# Patient Record
Sex: FEMALE | Race: WHITE | Employment: OTHER | URBAN - NONMETROPOLITAN AREA
[De-identification: names, ages, dates, MRNs, and addresses within clinical notes are randomized per-mention and may not be internally consistent; named-entity substitution may affect disease eponyms.]

---

## 2016-10-19 LAB — HBA1C MFR BLD: 8 %

## 2017-02-27 LAB
CHOLESTEROL, TOTAL: 162 MG/DL
CHOLESTEROL/HDL RATIO: 3.1
HDLC SERPL-MCNC: 52 MG/DL (ref 35–70)
LDL CHOLESTEROL CALCULATED: 83 MG/DL (ref 0–160)
TRIGL SERPL-MCNC: 135 MG/DL
VLDLC SERPL CALC-MCNC: 27 MG/DL

## 2017-04-24 ENCOUNTER — OFFICE VISIT (OUTPATIENT)
Dept: FAMILY MEDICINE CLINIC | Age: 82
End: 2017-04-24
Payer: MEDICARE

## 2017-04-24 VITALS
BODY MASS INDEX: 33.46 KG/M2 | DIASTOLIC BLOOD PRESSURE: 72 MMHG | SYSTOLIC BLOOD PRESSURE: 136 MMHG | HEIGHT: 64 IN | WEIGHT: 196 LBS | TEMPERATURE: 98.5 F | HEART RATE: 78 BPM

## 2017-04-24 DIAGNOSIS — B02.9 HERPES ZOSTER WITHOUT COMPLICATION: Primary | ICD-10-CM

## 2017-04-24 DIAGNOSIS — J06.9 UPPER RESPIRATORY TRACT INFECTION, UNSPECIFIED TYPE: ICD-10-CM

## 2017-04-24 PROCEDURE — 4040F PNEUMOC VAC/ADMIN/RCVD: CPT | Performed by: NURSE PRACTITIONER

## 2017-04-24 PROCEDURE — G8427 DOCREV CUR MEDS BY ELIG CLIN: HCPCS | Performed by: NURSE PRACTITIONER

## 2017-04-24 PROCEDURE — 1036F TOBACCO NON-USER: CPT | Performed by: NURSE PRACTITIONER

## 2017-04-24 PROCEDURE — 99213 OFFICE O/P EST LOW 20 MIN: CPT | Performed by: NURSE PRACTITIONER

## 2017-04-24 PROCEDURE — G8417 CALC BMI ABV UP PARAM F/U: HCPCS | Performed by: NURSE PRACTITIONER

## 2017-04-24 PROCEDURE — 1123F ACP DISCUSS/DSCN MKR DOCD: CPT | Performed by: NURSE PRACTITIONER

## 2017-04-24 PROCEDURE — G8400 PT W/DXA NO RESULTS DOC: HCPCS | Performed by: NURSE PRACTITIONER

## 2017-04-24 PROCEDURE — 1090F PRES/ABSN URINE INCON ASSESS: CPT | Performed by: NURSE PRACTITIONER

## 2017-04-24 RX ORDER — GABAPENTIN 300 MG/1
300 CAPSULE ORAL 3 TIMES DAILY
Qty: 90 CAPSULE | Refills: 0 | Status: SHIPPED | OUTPATIENT
Start: 2017-04-24 | End: 2017-05-31 | Stop reason: ALTCHOICE

## 2017-04-24 RX ORDER — ACYCLOVIR 800 MG/1
800 TABLET ORAL 4 TIMES DAILY
Qty: 40 TABLET | Refills: 0 | Status: SHIPPED | OUTPATIENT
Start: 2017-04-24 | End: 2017-05-04

## 2017-04-24 RX ORDER — NEBIVOLOL 5 MG/1
1 TABLET ORAL DAILY
COMMUNITY
End: 2018-09-10 | Stop reason: DRUGHIGH

## 2017-04-24 ASSESSMENT — ENCOUNTER SYMPTOMS
SORE THROAT: 0
VOMITING: 0
NAUSEA: 1
PHOTOPHOBIA: 0
SINUS PRESSURE: 1
VISUAL CHANGE: 0
BLURRED VISION: 0
RHINORRHEA: 0
COUGH: 1

## 2017-05-18 ENCOUNTER — TELEPHONE (OUTPATIENT)
Dept: FAMILY MEDICINE CLINIC | Age: 82
End: 2017-05-18

## 2017-05-18 DIAGNOSIS — B02.9 HERPES ZOSTER WITHOUT COMPLICATION: Primary | ICD-10-CM

## 2017-05-19 RX ORDER — HYDROCODONE BITARTRATE AND ACETAMINOPHEN 5; 325 MG/1; MG/1
1 TABLET ORAL EVERY 6 HOURS PRN
Qty: 20 TABLET | Refills: 0 | Status: SHIPPED | OUTPATIENT
Start: 2017-05-19 | End: 2017-05-26

## 2017-05-22 ENCOUNTER — TELEPHONE (OUTPATIENT)
Dept: FAMILY MEDICINE CLINIC | Age: 82
End: 2017-05-22

## 2017-05-25 VITALS
DIASTOLIC BLOOD PRESSURE: 76 MMHG | SYSTOLIC BLOOD PRESSURE: 128 MMHG | BODY MASS INDEX: 33.97 KG/M2 | WEIGHT: 199 LBS | HEART RATE: 98 BPM | HEIGHT: 64 IN

## 2017-05-25 DIAGNOSIS — E03.9 UNSPECIFIED HYPOTHYROIDISM: ICD-10-CM

## 2017-05-25 DIAGNOSIS — R26.89 BALANCE PROBLEM: ICD-10-CM

## 2017-05-25 PROBLEM — E78.5 HYPERLIPEMIA: Status: ACTIVE | Noted: 2017-05-25

## 2017-05-25 PROBLEM — E08.40 DIABETES MELLITUS DUE TO UNDERLYING CONDITION WITH DIABETIC NEUROPATHY (HCC): Status: ACTIVE | Noted: 2017-05-25

## 2017-05-25 PROBLEM — I25.10 CORONARY ATHEROSCLEROSIS OF NATIVE CORONARY ARTERY: Status: ACTIVE | Noted: 2017-05-25

## 2017-05-25 PROBLEM — J32.9 SINUSITIS: Status: ACTIVE | Noted: 2017-05-25

## 2017-05-25 RX ORDER — FLUOCINONIDE TOPICAL SOLUTION USP, 0.05% 0.5 MG/ML
SOLUTION TOPICAL 2 TIMES DAILY
COMMUNITY
End: 2017-08-24 | Stop reason: ALTCHOICE

## 2017-05-25 RX ORDER — PNV NO.95/FERROUS FUM/FOLIC AC 28MG-0.8MG
TABLET ORAL
COMMUNITY
End: 2017-08-24

## 2017-05-25 RX ORDER — AMOXICILLIN AND CLAVULANATE POTASSIUM 875; 125 MG/1; MG/1
1 TABLET, FILM COATED ORAL 2 TIMES DAILY
COMMUNITY
End: 2017-05-31

## 2017-05-25 RX ORDER — VITAMIN B COMPLEX
1 CAPSULE ORAL DAILY
COMMUNITY

## 2017-05-25 RX ORDER — OXYBUTYNIN CHLORIDE 5 MG/1
5 TABLET, EXTENDED RELEASE ORAL DAILY
COMMUNITY
End: 2017-08-24 | Stop reason: CLARIF

## 2017-05-25 RX ORDER — ALBUTEROL SULFATE 90 UG/1
2 AEROSOL, METERED RESPIRATORY (INHALATION) EVERY 6 HOURS PRN
COMMUNITY
End: 2018-02-12 | Stop reason: ALTCHOICE

## 2017-05-31 ENCOUNTER — OFFICE VISIT (OUTPATIENT)
Dept: FAMILY MEDICINE CLINIC | Age: 82
End: 2017-05-31
Payer: MEDICARE

## 2017-05-31 VITALS
HEIGHT: 64 IN | SYSTOLIC BLOOD PRESSURE: 128 MMHG | WEIGHT: 194 LBS | BODY MASS INDEX: 33.12 KG/M2 | HEART RATE: 80 BPM | DIASTOLIC BLOOD PRESSURE: 80 MMHG

## 2017-05-31 DIAGNOSIS — B02.29 NEURALGIA, POSTHERPETIC: Primary | ICD-10-CM

## 2017-05-31 DIAGNOSIS — N76.0 ACUTE VAGINITIS: ICD-10-CM

## 2017-05-31 PROCEDURE — G8598 ASA/ANTIPLAT THER USED: HCPCS | Performed by: FAMILY MEDICINE

## 2017-05-31 PROCEDURE — G8427 DOCREV CUR MEDS BY ELIG CLIN: HCPCS | Performed by: FAMILY MEDICINE

## 2017-05-31 PROCEDURE — 4040F PNEUMOC VAC/ADMIN/RCVD: CPT | Performed by: FAMILY MEDICINE

## 2017-05-31 PROCEDURE — G8400 PT W/DXA NO RESULTS DOC: HCPCS | Performed by: FAMILY MEDICINE

## 2017-05-31 PROCEDURE — 1123F ACP DISCUSS/DSCN MKR DOCD: CPT | Performed by: FAMILY MEDICINE

## 2017-05-31 PROCEDURE — 1090F PRES/ABSN URINE INCON ASSESS: CPT | Performed by: FAMILY MEDICINE

## 2017-05-31 PROCEDURE — 1036F TOBACCO NON-USER: CPT | Performed by: FAMILY MEDICINE

## 2017-05-31 PROCEDURE — 99214 OFFICE O/P EST MOD 30 MIN: CPT | Performed by: FAMILY MEDICINE

## 2017-05-31 PROCEDURE — G8417 CALC BMI ABV UP PARAM F/U: HCPCS | Performed by: FAMILY MEDICINE

## 2017-05-31 RX ORDER — GABAPENTIN 300 MG/1
300 CAPSULE ORAL 3 TIMES DAILY
Qty: 90 CAPSULE | Refills: 3 | Status: SHIPPED | OUTPATIENT
Start: 2017-05-31 | End: 2018-08-23 | Stop reason: SDUPTHER

## 2017-05-31 RX ORDER — FLUCONAZOLE 150 MG/1
TABLET ORAL
Qty: 2 TABLET | Refills: 0 | Status: SHIPPED | OUTPATIENT
Start: 2017-05-31 | End: 2017-06-03

## 2017-05-31 RX ORDER — LIDOCAINE 50 MG/G
1 PATCH TOPICAL DAILY
Qty: 30 PATCH | Refills: 0 | Status: SHIPPED | OUTPATIENT
Start: 2017-05-31 | End: 2018-03-07 | Stop reason: ALTCHOICE

## 2017-05-31 ASSESSMENT — PATIENT HEALTH QUESTIONNAIRE - PHQ9
1. LITTLE INTEREST OR PLEASURE IN DOING THINGS: 1
2. FEELING DOWN, DEPRESSED OR HOPELESS: 1
SUM OF ALL RESPONSES TO PHQ9 QUESTIONS 1 & 2: 2
SUM OF ALL RESPONSES TO PHQ QUESTIONS 1-9: 2

## 2017-06-01 ENCOUNTER — TELEPHONE (OUTPATIENT)
Dept: FAMILY MEDICINE CLINIC | Age: 82
End: 2017-06-01

## 2017-06-12 ENCOUNTER — TELEPHONE (OUTPATIENT)
Dept: FAMILY MEDICINE CLINIC | Age: 82
End: 2017-06-12

## 2017-08-11 LAB
AVERAGE GLUCOSE: ABNORMAL
HBA1C MFR BLD: 7.4 %

## 2017-08-24 ENCOUNTER — OFFICE VISIT (OUTPATIENT)
Dept: FAMILY MEDICINE CLINIC | Age: 82
End: 2017-08-24
Payer: MEDICARE

## 2017-08-24 VITALS
HEART RATE: 76 BPM | SYSTOLIC BLOOD PRESSURE: 110 MMHG | DIASTOLIC BLOOD PRESSURE: 70 MMHG | WEIGHT: 192 LBS | BODY MASS INDEX: 32.78 KG/M2

## 2017-08-24 DIAGNOSIS — I25.10 ATHEROSCLEROSIS OF NATIVE CORONARY ARTERY OF NATIVE HEART WITHOUT ANGINA PECTORIS: ICD-10-CM

## 2017-08-24 DIAGNOSIS — E78.2 MIXED HYPERLIPIDEMIA: ICD-10-CM

## 2017-08-24 DIAGNOSIS — Z79.4 DIABETES MELLITUS DUE TO UNDERLYING CONDITION WITH DIABETIC NEUROPATHY, WITH LONG-TERM CURRENT USE OF INSULIN (HCC): Primary | ICD-10-CM

## 2017-08-24 DIAGNOSIS — E08.40 DIABETES MELLITUS DUE TO UNDERLYING CONDITION WITH DIABETIC NEUROPATHY, WITH LONG-TERM CURRENT USE OF INSULIN (HCC): Primary | ICD-10-CM

## 2017-08-24 DIAGNOSIS — E03.9 UNSPECIFIED HYPOTHYROIDISM: ICD-10-CM

## 2017-08-24 DIAGNOSIS — N76.1 CHRONIC VAGINITIS: ICD-10-CM

## 2017-08-24 DIAGNOSIS — Z79.4 DIABETES MELLITUS DUE TO UNDERLYING CONDITION WITH DIABETIC NEUROPATHY, WITH LONG-TERM CURRENT USE OF INSULIN (HCC): ICD-10-CM

## 2017-08-24 DIAGNOSIS — E08.40 DIABETES MELLITUS DUE TO UNDERLYING CONDITION WITH DIABETIC NEUROPATHY, WITH LONG-TERM CURRENT USE OF INSULIN (HCC): ICD-10-CM

## 2017-08-24 PROCEDURE — 4040F PNEUMOC VAC/ADMIN/RCVD: CPT | Performed by: FAMILY MEDICINE

## 2017-08-24 PROCEDURE — G8598 ASA/ANTIPLAT THER USED: HCPCS | Performed by: FAMILY MEDICINE

## 2017-08-24 PROCEDURE — 1123F ACP DISCUSS/DSCN MKR DOCD: CPT | Performed by: FAMILY MEDICINE

## 2017-08-24 PROCEDURE — 1090F PRES/ABSN URINE INCON ASSESS: CPT | Performed by: FAMILY MEDICINE

## 2017-08-24 PROCEDURE — 99214 OFFICE O/P EST MOD 30 MIN: CPT | Performed by: FAMILY MEDICINE

## 2017-08-24 PROCEDURE — G8417 CALC BMI ABV UP PARAM F/U: HCPCS | Performed by: FAMILY MEDICINE

## 2017-08-24 PROCEDURE — G8427 DOCREV CUR MEDS BY ELIG CLIN: HCPCS | Performed by: FAMILY MEDICINE

## 2017-08-24 PROCEDURE — G8400 PT W/DXA NO RESULTS DOC: HCPCS | Performed by: FAMILY MEDICINE

## 2017-08-24 PROCEDURE — 1036F TOBACCO NON-USER: CPT | Performed by: FAMILY MEDICINE

## 2017-08-24 RX ORDER — FLUCONAZOLE 150 MG/1
TABLET ORAL
Qty: 2 TABLET | Refills: 2 | Status: SHIPPED | OUTPATIENT
Start: 2017-08-24 | End: 2017-12-26 | Stop reason: SDUPTHER

## 2017-08-24 RX ORDER — ESOMEPRAZOLE MAGNESIUM 40 MG/1
1 CAPSULE, DELAYED RELEASE ORAL DAILY
COMMUNITY
Start: 2017-07-23 | End: 2018-07-27

## 2017-08-24 RX ORDER — OXYBUTYNIN CHLORIDE 15 MG/1
1 TABLET, EXTENDED RELEASE ORAL DAILY
Refills: 0 | COMMUNITY
Start: 2017-07-20 | End: 2019-08-23 | Stop reason: SDUPTHER

## 2017-08-24 RX ORDER — CLOTRIMAZOLE 1 %
1 CREAM (GRAM) TOPICAL PRN
Refills: 0 | COMMUNITY
Start: 2017-06-29 | End: 2019-06-12

## 2017-08-24 RX ORDER — PRAVASTATIN SODIUM 40 MG
1 TABLET ORAL DAILY
COMMUNITY
Start: 2017-07-25 | End: 2022-09-06

## 2017-08-24 ASSESSMENT — ENCOUNTER SYMPTOMS
SHORTNESS OF BREATH: 0
ABDOMINAL PAIN: 0
DIARRHEA: 0
BLURRED VISION: 0
CONSTIPATION: 0

## 2017-12-26 DIAGNOSIS — N76.1 CHRONIC VAGINITIS: ICD-10-CM

## 2017-12-26 NOTE — TELEPHONE ENCOUNTER
Ashley Andrade is calling to request a refill on the following medication(s):  Requested Prescriptions     Pending Prescriptions Disp Refills    fluconazole (DIFLUCAN) 150 MG tablet [Pharmacy Med Name: FLUCONAZOLE 150 MG TABLET] 2 tablet 2     Sig: take 1 tablet by mouth for 1 dose and repeat in 3 days if needed       Last Visit Date (If Applicable):  7/68/6939    Next Visit Date:    3/7/2018

## 2017-12-27 RX ORDER — FLUCONAZOLE 150 MG/1
TABLET ORAL
Qty: 2 TABLET | Refills: 2 | Status: SHIPPED | OUTPATIENT
Start: 2017-12-27 | End: 2018-02-12 | Stop reason: ALTCHOICE

## 2018-02-12 ENCOUNTER — OFFICE VISIT (OUTPATIENT)
Dept: FAMILY MEDICINE CLINIC | Age: 83
End: 2018-02-12
Payer: MEDICARE

## 2018-02-12 VITALS
SYSTOLIC BLOOD PRESSURE: 124 MMHG | OXYGEN SATURATION: 97 % | WEIGHT: 186 LBS | DIASTOLIC BLOOD PRESSURE: 76 MMHG | BODY MASS INDEX: 31.75 KG/M2 | HEART RATE: 80 BPM
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
## 2018-02-12 DIAGNOSIS — S20.212A CONTUSION OF RIB ON LEFT SIDE, INITIAL ENCOUNTER: Primary | ICD-10-CM

## 2018-02-12 PROCEDURE — 1123F ACP DISCUSS/DSCN MKR DOCD: CPT | Performed by: FAMILY MEDICINE

## 2018-02-12 PROCEDURE — G8484 FLU IMMUNIZE NO ADMIN: HCPCS | Performed by: FAMILY MEDICINE

## 2018-02-12 PROCEDURE — G8427 DOCREV CUR MEDS BY ELIG CLIN: HCPCS | Performed by: FAMILY MEDICINE

## 2018-02-12 PROCEDURE — G8417 CALC BMI ABV UP PARAM F/U: HCPCS | Performed by: FAMILY MEDICINE

## 2018-02-12 PROCEDURE — 99213 OFFICE O/P EST LOW 20 MIN: CPT | Performed by: FAMILY MEDICINE

## 2018-02-12 PROCEDURE — 1090F PRES/ABSN URINE INCON ASSESS: CPT | Performed by: FAMILY MEDICINE

## 2018-02-12 PROCEDURE — G8598 ASA/ANTIPLAT THER USED: HCPCS | Performed by: FAMILY MEDICINE

## 2018-02-12 PROCEDURE — 4040F PNEUMOC VAC/ADMIN/RCVD: CPT | Performed by: FAMILY MEDICINE

## 2018-02-12 PROCEDURE — 1036F TOBACCO NON-USER: CPT | Performed by: FAMILY MEDICINE

## 2018-02-12 PROCEDURE — G8400 PT W/DXA NO RESULTS DOC: HCPCS | Performed by: FAMILY MEDICINE

## 2018-02-18 ASSESSMENT — ENCOUNTER SYMPTOMS
CHEST TIGHTNESS: 0
CONSTIPATION: 0
BLOOD IN STOOL: 0
DIARRHEA: 0
SHORTNESS OF BREATH: 0

## 2018-03-07 ENCOUNTER — OFFICE VISIT (OUTPATIENT)
Dept: FAMILY MEDICINE CLINIC | Age: 83
End: 2018-03-07
Payer: MEDICARE

## 2018-03-07 VITALS
DIASTOLIC BLOOD PRESSURE: 76 MMHG | BODY MASS INDEX: 33.49 KG/M2 | HEIGHT: 63 IN | SYSTOLIC BLOOD PRESSURE: 124 MMHG | HEART RATE: 74 BPM | WEIGHT: 189 LBS

## 2018-03-07 DIAGNOSIS — Z00.00 ROUTINE GENERAL MEDICAL EXAMINATION AT A HEALTH CARE FACILITY: ICD-10-CM

## 2018-03-07 DIAGNOSIS — E08.40 DIABETES MELLITUS DUE TO UNDERLYING CONDITION WITH DIABETIC NEUROPATHY, WITH LONG-TERM CURRENT USE OF INSULIN (HCC): Primary | ICD-10-CM

## 2018-03-07 DIAGNOSIS — Z79.4 DIABETES MELLITUS DUE TO UNDERLYING CONDITION WITH DIABETIC NEUROPATHY, WITH LONG-TERM CURRENT USE OF INSULIN (HCC): Primary | ICD-10-CM

## 2018-03-07 DIAGNOSIS — Z23 NEED FOR PROPHYLACTIC VACCINATION AND INOCULATION AGAINST VARICELLA: ICD-10-CM

## 2018-03-07 LAB — HBA1C MFR BLD: 10.2 %

## 2018-03-07 PROCEDURE — G0439 PPPS, SUBSEQ VISIT: HCPCS | Performed by: FAMILY MEDICINE

## 2018-03-07 PROCEDURE — G8427 DOCREV CUR MEDS BY ELIG CLIN: HCPCS | Performed by: FAMILY MEDICINE

## 2018-03-07 PROCEDURE — G8400 PT W/DXA NO RESULTS DOC: HCPCS | Performed by: FAMILY MEDICINE

## 2018-03-07 PROCEDURE — 1123F ACP DISCUSS/DSCN MKR DOCD: CPT | Performed by: FAMILY MEDICINE

## 2018-03-07 PROCEDURE — 99214 OFFICE O/P EST MOD 30 MIN: CPT | Performed by: FAMILY MEDICINE

## 2018-03-07 PROCEDURE — 1090F PRES/ABSN URINE INCON ASSESS: CPT | Performed by: FAMILY MEDICINE

## 2018-03-07 PROCEDURE — G8417 CALC BMI ABV UP PARAM F/U: HCPCS | Performed by: FAMILY MEDICINE

## 2018-03-07 PROCEDURE — G8598 ASA/ANTIPLAT THER USED: HCPCS | Performed by: FAMILY MEDICINE

## 2018-03-07 PROCEDURE — 4040F PNEUMOC VAC/ADMIN/RCVD: CPT | Performed by: FAMILY MEDICINE

## 2018-03-07 PROCEDURE — G8482 FLU IMMUNIZE ORDER/ADMIN: HCPCS | Performed by: FAMILY MEDICINE

## 2018-03-07 PROCEDURE — 1036F TOBACCO NON-USER: CPT | Performed by: FAMILY MEDICINE

## 2018-03-07 PROCEDURE — 83036 HEMOGLOBIN GLYCOSYLATED A1C: CPT | Performed by: FAMILY MEDICINE

## 2018-03-07 RX ORDER — FERROUS SULFATE 325(65) MG
1 TABLET ORAL DAILY
COMMUNITY
End: 2020-05-15

## 2018-03-07 ASSESSMENT — ANXIETY QUESTIONNAIRES: GAD7 TOTAL SCORE: 0

## 2018-03-07 ASSESSMENT — LIFESTYLE VARIABLES: HOW OFTEN DO YOU HAVE A DRINK CONTAINING ALCOHOL: 0

## 2018-03-07 ASSESSMENT — PATIENT HEALTH QUESTIONNAIRE - PHQ9: SUM OF ALL RESPONSES TO PHQ QUESTIONS 1-9: 0

## 2018-03-07 NOTE — PROGRESS NOTES
and 55 units  at h.s. Yes Historical Provider, MD   Multiple Vitamins-Minerals (MULTIVITAMIN PO) Take  by mouth. Yes Historical Provider, MD   Cyanocobalamin (VITAMIN B-12 CR) 1000 MCG TBCR Take  by mouth. Yes Historical Provider, MD   Omega-3 Fatty Acids (OMEGA 3 PO) Take  by mouth. Yes Historical Provider, MD   VITAMIN D-3 (COLECALCIFEROL) 400 UNITS TABS Take 2,000 Units by mouth daily.  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)     Type II or unspecified type diabetes mellitus without mention of complication, not stated as uncontrolled     Unspecified sleep apnea      Past Surgical History:   Procedure Laterality Date    APPENDECTOMY      CARDIAC CATHETERIZATION  2010    CARDIAC SURGERY  04/02/2011    repaired aorta    CARPAL TUNNEL RELEASE      COLONOSCOPY  2010    multiple colon polyps - repeat every 3 years   Talat Jackson      cataracts - Dr Sarwat Chaudhry, TOTAL ABDOMINAL      hemorrhaging, complete    THORACIC AORTIC ANEURYSM REPAIR      2011    THYROID SURGERY  1960    gland removed       Family History   Problem Relation Age of Onset    Heart Disease Mother     Heart Disease Father     Breast Cancer Sister      10 years ago     No Known Problems Sister     No Known Problems Sister     No Known Problems Sister        CareTeam (Including outside providers/suppliers regularly involved in providing care):   Patient Care Team:  Abi Ferreira MD as PCP - General (Family Medicine)  Abi Ferreira MD as PCP - S Attributed Provider  Kourtney Steel MD as Consulting Physician (Endocrinology)

## 2018-03-08 LAB
AGE FOR GFR: 82
ANION GAP SERPL CALCULATED.3IONS-SCNC: 17 MMOL/L
BUN BLDV-MCNC: 18 MG/DL
CALCIUM SERPL-MCNC: 9.3 MG/DL
CHLORIDE BLD-SCNC: 103 MMOL/L
CHOLESTEROL/HDL RATIO: 2.7 RATIO
CHOLESTEROL: 160 MG/DL
CO2: 25 MMOL/L
CREAT SERPL-MCNC: 0.4 MG/DL
CREATININE, RANDOM: 74.9 MG/DL
EGFR BF: 185 ML/MIN/1.73 M2
EGFR BM: 250 ML/MIN/1.73 M2
EGFR WF: 153 ML/MIN/1.73 M2
EGFR WM: 206 ML/MIN/1.73 M2
GLUCOSE: 265 MG/DL
HDL, DIRECT: 59 MG/DL
LDL CHOLESTEROL CALCULATED: 72.4 MG/DL
MICROALBUMIN UR-MCNC: 2.2 MG/DL
MICROALBUMIN/CREAT UR-RTO: 29.4 MCG/MG CR
POTASSIUM SERPL-SCNC: 4.9 MMOL/L
SODIUM BLD-SCNC: 140 MMOL/L
T4 FREE: 0.98 NG/DL
TRIGL SERPL-MCNC: 143 MG/DL
TSH SERPL DL<=0.05 MIU/L-ACNC: 0.13 MIU/ML
VLDLC SERPL CALC-MCNC: 29 MG/DL

## 2018-07-12 ENCOUNTER — OFFICE VISIT (OUTPATIENT)
Dept: FAMILY MEDICINE CLINIC | Age: 83
End: 2018-07-12
Payer: MEDICARE

## 2018-07-12 VITALS — SYSTOLIC BLOOD PRESSURE: 126 MMHG | DIASTOLIC BLOOD PRESSURE: 72 MMHG | HEART RATE: 72 BPM

## 2018-07-12 DIAGNOSIS — Z63.8 STRESS DUE TO FAMILY TENSION: ICD-10-CM

## 2018-07-12 DIAGNOSIS — N64.4 MASTALGIA: ICD-10-CM

## 2018-07-12 DIAGNOSIS — R07.1 CHEST PAIN ON BREATHING: ICD-10-CM

## 2018-07-12 DIAGNOSIS — H60.313 ACUTE DIFFUSE OTITIS EXTERNA OF BOTH EARS: Primary | ICD-10-CM

## 2018-07-12 LAB — AEROBIC CULTURE: NORMAL

## 2018-07-12 PROCEDURE — 1090F PRES/ABSN URINE INCON ASSESS: CPT | Performed by: FAMILY MEDICINE

## 2018-07-12 PROCEDURE — G8598 ASA/ANTIPLAT THER USED: HCPCS | Performed by: FAMILY MEDICINE

## 2018-07-12 PROCEDURE — 99214 OFFICE O/P EST MOD 30 MIN: CPT | Performed by: FAMILY MEDICINE

## 2018-07-12 PROCEDURE — 1101F PT FALLS ASSESS-DOCD LE1/YR: CPT | Performed by: FAMILY MEDICINE

## 2018-07-12 PROCEDURE — G8417 CALC BMI ABV UP PARAM F/U: HCPCS | Performed by: FAMILY MEDICINE

## 2018-07-12 PROCEDURE — 1036F TOBACCO NON-USER: CPT | Performed by: FAMILY MEDICINE

## 2018-07-12 PROCEDURE — G8400 PT W/DXA NO RESULTS DOC: HCPCS | Performed by: FAMILY MEDICINE

## 2018-07-12 PROCEDURE — 4040F PNEUMOC VAC/ADMIN/RCVD: CPT | Performed by: FAMILY MEDICINE

## 2018-07-12 PROCEDURE — 1123F ACP DISCUSS/DSCN MKR DOCD: CPT | Performed by: FAMILY MEDICINE

## 2018-07-12 PROCEDURE — 4130F TOPICAL PREP RX AOE: CPT | Performed by: FAMILY MEDICINE

## 2018-07-12 PROCEDURE — G8427 DOCREV CUR MEDS BY ELIG CLIN: HCPCS | Performed by: FAMILY MEDICINE

## 2018-07-12 RX ORDER — INSULIN GLARGINE 100 [IU]/ML
30 INJECTION, SOLUTION SUBCUTANEOUS NIGHTLY
COMMUNITY
End: 2018-07-19 | Stop reason: DRUGHIGH

## 2018-07-12 RX ORDER — DOXYCYCLINE HYCLATE 100 MG
100 TABLET ORAL 2 TIMES DAILY
Qty: 20 TABLET | Refills: 0 | Status: SHIPPED | OUTPATIENT
Start: 2018-07-12 | End: 2018-07-22

## 2018-07-12 SDOH — SOCIAL STABILITY - SOCIAL INSECURITY: OTHER SPECIFIED PROBLEMS RELATED TO PRIMARY SUPPORT GROUP: Z63.8

## 2018-07-12 ASSESSMENT — ENCOUNTER SYMPTOMS
BACK PAIN: 1
RHINORRHEA: 0
SHORTNESS OF BREATH: 0
COUGH: 0
SORE THROAT: 0
ABDOMINAL PAIN: 0
SPUTUM PRODUCTION: 0

## 2018-07-12 NOTE — PROGRESS NOTES
Sister     No Known Problems Sister        Social History   Substance Use Topics    Smoking status: Never Smoker    Smokeless tobacco: Never Used    Alcohol use No      Current Outpatient Prescriptions   Medication Sig Dispense Refill    insulin glargine (LANTUS) 100 UNIT/ML injection vial Inject 30 Units into the skin nightly      neomycin-polymyxin-hydrocortisone (CORTISPORIN) 3.5-62998-0 otic solution Place 3 drops in ear(s) 3 times daily for 10 days 1 each 0    doxycycline hyclate (VIBRA-TABS) 100 MG tablet Take 1 tablet by mouth 2 times daily for 10 days 20 tablet 0    ferrous sulfate 325 (65 Fe) MG tablet Take 1 tablet by mouth daily      pravastatin (PRAVACHOL) 40 MG tablet Take 1 tablet by mouth daily      oxybutynin (DITROPAN XL) 15 MG extended release tablet Take 1 tablet by mouth daily  0    esomeprazole (NEXIUM) 40 MG delayed release capsule Take 1 capsule by mouth daily      clotrimazole (LOTRIMIN) 1 % cream Apply 1 strip topically as needed  0    gabapentin (NEURONTIN) 300 MG capsule Take 1 capsule by mouth 3 times daily (Patient taking differently: Take 300 mg by mouth daily .) 90 capsule 3    b complex vitamins capsule Take 1 capsule by mouth daily      Insulin Pen Needle (NOVOTWIST) 32G X 5 MM MISC 1 each by Does not apply route daily      nebivolol (BYSTOLIC) 5 MG tablet Take 1 tablet by mouth daily      DULoxetine (CYMBALTA) 30 MG capsule Take 30 mg by mouth daily.  levothyroxine (SYNTHROID) 75 MCG tablet Take 75 mcg by mouth Daily.  aspirin 81 MG tablet Take 81 mg by mouth daily.  insulin regular human (HUMULIN R) 500 UNIT/ML injection Inject into the skin Indications: 60 units in a.m. , 60 untis at lunch time , and 55 units  at h.s.       Multiple Vitamins-Minerals (MULTIVITAMIN PO) Take  by mouth.  Cyanocobalamin (VITAMIN B-12 CR) 1000 MCG TBCR Take  by mouth.  Omega-3 Fatty Acids (OMEGA 3 PO) Take  by mouth.       VITAMIN D-3 (COLECALCIFEROL) 400

## 2018-07-17 ENCOUNTER — TELEPHONE (OUTPATIENT)
Dept: FAMILY MEDICINE CLINIC | Age: 83
End: 2018-07-17

## 2018-07-17 RX ORDER — SULFAMETHOXAZOLE AND TRIMETHOPRIM 800; 160 MG/1; MG/1
1 TABLET ORAL 2 TIMES DAILY
Qty: 20 TABLET | Refills: 0 | Status: SHIPPED | OUTPATIENT
Start: 2018-07-17 | End: 2018-07-27

## 2018-07-17 NOTE — TELEPHONE ENCOUNTER
Positive Aerobic Bacteria Culture. Results scanned in. Results scanned in and sent to on call due to pcp being out of the office.

## 2018-07-18 ENCOUNTER — TELEPHONE (OUTPATIENT)
Dept: FAMILY MEDICINE CLINIC | Age: 83
End: 2018-07-18

## 2018-07-18 DIAGNOSIS — N64.4 MASTALGIA: Primary | ICD-10-CM

## 2018-07-18 NOTE — TELEPHONE ENCOUNTER
Has not had a bilateral Mammogram since 2013 could they have the order for that.     Per [facility], ok for mammogram bilateral

## 2018-07-19 ENCOUNTER — OFFICE VISIT (OUTPATIENT)
Dept: FAMILY MEDICINE CLINIC | Age: 83
End: 2018-07-19
Payer: MEDICARE

## 2018-07-19 VITALS
TEMPERATURE: 101.2 F | WEIGHT: 188 LBS | BODY MASS INDEX: 32.88 KG/M2 | HEART RATE: 80 BPM | SYSTOLIC BLOOD PRESSURE: 108 MMHG | DIASTOLIC BLOOD PRESSURE: 68 MMHG

## 2018-07-19 DIAGNOSIS — R26.89 BALANCE PROBLEM: ICD-10-CM

## 2018-07-19 DIAGNOSIS — E03.9 ACQUIRED HYPOTHYROIDISM: ICD-10-CM

## 2018-07-19 DIAGNOSIS — H60.393 OTHER INFECTIVE ACUTE OTITIS EXTERNA OF BOTH EARS: ICD-10-CM

## 2018-07-19 DIAGNOSIS — R11.2 INTRACTABLE VOMITING WITH NAUSEA, UNSPECIFIED VOMITING TYPE: Primary | ICD-10-CM

## 2018-07-19 DIAGNOSIS — I25.10 ATHEROSCLEROSIS OF NATIVE CORONARY ARTERY OF NATIVE HEART WITHOUT ANGINA PECTORIS: ICD-10-CM

## 2018-07-19 PROBLEM — E08.40 DIABETES MELLITUS DUE TO UNDERLYING CONDITION WITH DIABETIC NEUROPATHY (HCC): Status: RESOLVED | Noted: 2017-05-25 | Resolved: 2018-07-19

## 2018-07-19 LAB
A/G RATIO: 1.4 RATIO
AGE FOR GFR: 83
ALBUMIN: 3.9 G/DL
ALK PHOSPHATASE: 92 UNITS/L
ALT SERPL-CCNC: 24 UNITS/L
ANION GAP SERPL CALCULATED.3IONS-SCNC: 16 MMOL/L
APPEARANCE: CLEAR
AST SERPL-CCNC: 21 UNITS/L
BACTERIA: ABNORMAL 1HPF
BANDS: 0 %
BASOPHILS # BLD: 0 %
BILIRUB SERPL-MCNC: 0.9 MG/DL
BILIRUBIN: ABNORMAL
BLOOD CULTURE, ROUTINE: NORMAL
BLOOD: ABNORMAL
BUN BLDV-MCNC: 13 MG/DL
CALCIUM SERPL-MCNC: 8.7 MG/DL
CASTS: ABNORMAL /LPF
CHLORIDE BLD-SCNC: 97 MMOL/L
CK MB: 2.1 NG/ML
CO2: 27 MMOL/L
COLOR: YELLOW
CREAT SERPL-MCNC: 0.5 MG/DL
CRYSTALS: ABNORMAL /HPF
CULTURE, BLOOD 2: NORMAL
DIFFERENTIAL: MANUAL DIFF
EGFR BF: 143 ML/MIN/1.73 M2
EGFR BM: 192 ML/MIN/1.73 M2
EGFR WF: 118 ML/MIN/1.73 M2
EGFR WM: 159 ML/MIN/1.73 M2
EOSINOPHIL # BLD: 0 %
EPITHELIAL CELLS, UA: ABNORMAL /HPF
GLOBULIN: 2.7 G/DL
GLUCOSE: 232 MG/DL
GLUCOSE: ABNORMAL MG/DL
HCT VFR BLD CALC: 39.7 %
HEMOGLOBIN: 13.3 G/DL
INDEX: 1.8 %
KETONES: ABNORMAL MG/DL
LEUKOCYTES, UA: ABNORMAL
LYMPHOCYTES # BLD: 3 %
MCH RBC QN AUTO: 29.3 PG
MCHC RBC AUTO-ENTMCNC: 33.4 G/DL
MCV RBC AUTO: 87.6 FL
MICROSCOPIC URINE: ABNORMAL
MONOCYTES: 5 %
MORPHOLOGY: NORMAL
MUCUS: ABNORMAL /HPF
MYOGLOBIN: 145.5 NG/ML
NEUTROPHILS: 92 %
NITRITE, URINE: ABNORMAL
PDW BLD-RTO: 12.8 %
PH: 6 PH
PLATELET # BLD: 177 THOU/MM3
PMV BLD AUTO: 7.9 FL
POTASSIUM SERPL-SCNC: 4.8 MMOL/L
PROTEIN,SCREEN: ABNORMAL MG/DL
RBC # BLD: 4.53 M/UL
RBC: ABNORMAL /HPF
SODIUM BLD-SCNC: 135 MMOL/L
SPECIFIC GRAVITY, URINE: 1.01 MG/DL
TOTAL CK: 114 UNITS/L
TOTAL PROTEIN: 6.6 G/DL
TROPONIN: <0.012 NG/ML
UROBILINOGEN, URINE: 0.2 MG/DL
WBC # BLD: 9.29 THOU/ML3
WBC URINE: ABNORMAL
YEAST: ABNORMAL /HPF

## 2018-07-19 RX ORDER — INSULIN GLARGINE 100 [IU]/ML
40 INJECTION, SOLUTION SUBCUTANEOUS NIGHTLY
Qty: 1 VIAL | Refills: 0 | Status: SHIPPED
Start: 2018-07-19 | End: 2019-01-09 | Stop reason: DRUGHIGH

## 2018-07-19 ASSESSMENT — ENCOUNTER SYMPTOMS
STRIDOR: 0
CONSTIPATION: 0
DIARRHEA: 0
FACIAL SWELLING: 0
SHORTNESS OF BREATH: 0
BLOOD IN STOOL: 0
COUGH: 0
PHOTOPHOBIA: 0

## 2018-07-19 NOTE — PROGRESS NOTES
Uncontrolled type 2 diabetes mellitus with diabetic polyneuropathy, with long-term current use of insulin ([facility]     5. Atherosclerosis of native coronary artery of native heart without angina pectoris     6. Acquired hypothyroidism         Will admit Venecia Lynneousmane) and do a STAT CT in light of the fever, severe HA and balance issue to rule out mastoiditis. Will check the CBC. May be simple reaction to bactrim vs viral syndrome vs extension of infection however this is much less likely in light of the overall significant improvement in every other regard. Lab Results   Component Value Date    CHOL 160 03/08/2018    TRIG 143 03/08/2018    HDL 52 02/27/2017     03/08/2018    K 4.9 03/08/2018     03/08/2018    CREATININE 0.4 (L) 03/08/2018    BUN 18 (H) 03/08/2018    CO2 25 03/08/2018    TSH 0.13 (L) 03/08/2018    LABA1C 10.2 03/07/2018    LABA1C 7.4 08/11/2017    LABA1C 8.0 10/19/2016    LABMICR 2.2 (H) 03/08/2018       Return after this hospital discharge. All patient questions answered. Pt voiced understanding. .  Patient agreed with treatment plan. Follow up as directed.      Electronically signed by Paresh Harrington MD on 7/19/2018

## 2018-07-23 ENCOUNTER — TELEPHONE (OUTPATIENT)
Dept: FAMILY MEDICINE CLINIC | Age: 83
End: 2018-07-23

## 2018-07-27 ENCOUNTER — OFFICE VISIT (OUTPATIENT)
Dept: FAMILY MEDICINE CLINIC | Age: 83
End: 2018-07-27
Payer: MEDICARE

## 2018-07-27 VITALS
OXYGEN SATURATION: 96 % | HEART RATE: 74 BPM | WEIGHT: 189 LBS | SYSTOLIC BLOOD PRESSURE: 124 MMHG | DIASTOLIC BLOOD PRESSURE: 70 MMHG | BODY MASS INDEX: 33.06 KG/M2

## 2018-07-27 DIAGNOSIS — R26.89 BALANCE PROBLEM: ICD-10-CM

## 2018-07-27 DIAGNOSIS — R11.2 INTRACTABLE VOMITING WITH NAUSEA, UNSPECIFIED VOMITING TYPE: ICD-10-CM

## 2018-07-27 DIAGNOSIS — Z63.8 FAMILY DISCORD: ICD-10-CM

## 2018-07-27 DIAGNOSIS — H90.3 SENSORINEURAL HEARING LOSS (SNHL) OF BOTH EARS: ICD-10-CM

## 2018-07-27 DIAGNOSIS — R42 DIZZINESS: ICD-10-CM

## 2018-07-27 PROCEDURE — 1111F DSCHRG MED/CURRENT MED MERGE: CPT | Performed by: FAMILY MEDICINE

## 2018-07-27 PROCEDURE — 99496 TRANSJ CARE MGMT HIGH F2F 7D: CPT | Performed by: FAMILY MEDICINE

## 2018-07-27 RX ORDER — ESOMEPRAZOLE MAGNESIUM 40 MG/1
40 CAPSULE, DELAYED RELEASE ORAL DAILY
COMMUNITY
End: 2018-07-27 | Stop reason: SDUPTHER

## 2018-07-27 RX ORDER — CHOLECALCIFEROL (VITAMIN D3) 25 MCG
1000 TABLET,CHEWABLE ORAL DAILY
COMMUNITY
End: 2020-05-15

## 2018-07-27 RX ORDER — ESOMEPRAZOLE MAGNESIUM 40 MG/1
40 CAPSULE, DELAYED RELEASE ORAL DAILY
Qty: 90 CAPSULE | Refills: 3 | Status: SHIPPED | OUTPATIENT
Start: 2018-07-27 | End: 2020-07-29 | Stop reason: SDUPTHER

## 2018-07-27 SDOH — SOCIAL STABILITY - SOCIAL INSECURITY: OTHER SPECIFIED PROBLEMS RELATED TO PRIMARY SUPPORT GROUP: Z63.8

## 2018-07-27 NOTE — PROGRESS NOTES
nebivolol (BYSTOLIC) 5 MG tablet Take 1 tablet by mouth daily      DULoxetine (CYMBALTA) 30 MG capsule Take 30 mg by mouth daily.  levothyroxine (SYNTHROID) 75 MCG tablet Take 75 mcg by mouth Daily.  aspirin 81 MG tablet Take 81 mg by mouth daily.  insulin regular human (HUMULIN R) 500 UNIT/ML injection Inject into the skin Indications: 60 units in a.m. , 60 untis at lunch time , and 55 units  at h.s.       Multiple Vitamins-Minerals (MULTIVITAMIN PO) Take  by mouth.  Cyanocobalamin (VITAMIN B-12 CR) 1000 MCG TBCR Take  by mouth.  Omega-3 Fatty Acids (OMEGA 3 PO) Take  by mouth.  VITAMIN D-3 (COLECALCIFEROL) 400 UNITS TABS Take 2,000 Units by mouth daily. Medications patient taking as of now reconciled against medications ordered at time of hospital discharge: Yes    Vitals:    07/27/18 1139   BP: 124/70   Pulse: 74   SpO2: 96%   Weight: 189 lb (85.7 kg)     Body mass index is 33.06 kg/m². Wt Readings from Last 3 Encounters:   07/27/18 189 lb (85.7 kg)   07/19/18 188 lb (85.3 kg)   03/07/18 189 lb (85.7 kg)     BP Readings from Last 3 Encounters:   07/27/18 124/70   07/19/18 108/68   07/12/18 126/72        Inpatient course: Discharge summary reviewed- see chart. Chief Complaint   Patient presents with   [address] from [facility] 7/20/18 admitted for vomiting. states now i am just tired. my ears are still affected. left ear cant hear out of at all, and right one is still muffled. denies anymore vomiting, for the most part feeling better. still using ear gtts. Had been treated for otitis externa and was getting better but then had woken up feeling good but then was suddenly very dizzy and off balance. Could not walk straight and could stop vomiting. [place] horrible. Was admitted for hydration, antiemetics and felt a lot better the next day.  .      Review of Systems   Constitutional: Negative for activity change, appetite change, diaphoresis, fatigue, of motion. Neck supple. No thyromegaly present. Cardiovascular: Normal rate, regular rhythm and normal heart sounds. Pulmonary/Chest: Effort normal and breath sounds normal. No respiratory distress. She has no wheezes. She has no rales. Musculoskeletal: Normal range of motion. She exhibits no edema. Lymphadenopathy:     She has no cervical adenopathy. Neurological: She is alert and oriented to person, place, and time. No cranial nerve deficit (hearing markedly diminished but no hearing aides in- actually grossly better than last week). Coordination (gait is much improved, still slight unsteady but much improved) abnormal.   Skin: She is not diaphoretic. Psychiatric: She has a normal mood and affect. Her behavior is normal. Judgment and thought content normal.   Nursing note and vitals reviewed. Assessment/Plan:  Disha Heller was seen today for follow-up from hospital.    Diagnoses and all orders for this visit:    Intractable vomiting with nausea, unspecified vomiting type-- resolved    Dizziness  -     [place] DISCHARGE MEDS RECONCILED W/ CURRENT OUTPATIENT MED LIST- much improved would start the PT for gait training. Not likely ear related, more related at this time to her significant neuropathy and arthritic changes    Family discord--- appt scheduled for spouse and reassured. Discussed her safety and making sure she has a safety plan-- make sure no ammunition for the shot gun in the house    Sensorineural hearing loss (SNHL) of both ears  -     [place] DISCHARGE MEDS RECONCILED W/ CURRENT OUTPATIENT MED LIST-- keep the follow up with the ENT and here if needed sooner. Balance problem    Uncontrolled type 2 diabetes mellitus with diabetic polyneuropathy, with long-term current use of insulin ([address]  - continue with the insulin per endocrine and watch closely. Follow up as scheduled with endocrine  Other orders  -     esomeprazole (NEXIUM) 40 MG delayed release capsule;  Take 1 capsule by mouth

## 2018-07-28 ASSESSMENT — ENCOUNTER SYMPTOMS
DIARRHEA: 0
WHEEZING: 0
CONSTIPATION: 0
SHORTNESS OF BREATH: 0
NAUSEA: 0
STRIDOR: 0
CHOKING: 0

## 2018-08-10 ENCOUNTER — OFFICE VISIT (OUTPATIENT)
Dept: FAMILY MEDICINE CLINIC | Age: 83
End: 2018-08-10
Payer: MEDICARE

## 2018-08-10 VITALS
HEART RATE: 88 BPM | TEMPERATURE: 100.9 F | DIASTOLIC BLOOD PRESSURE: 70 MMHG | BODY MASS INDEX: 33.76 KG/M2 | SYSTOLIC BLOOD PRESSURE: 136 MMHG | WEIGHT: 193 LBS

## 2018-08-10 DIAGNOSIS — H60.63 CHRONIC OTITIS EXTERNA OF BOTH EARS, UNSPECIFIED TYPE: ICD-10-CM

## 2018-08-10 DIAGNOSIS — J40 BRONCHITIS: Primary | ICD-10-CM

## 2018-08-10 PROCEDURE — 1123F ACP DISCUSS/DSCN MKR DOCD: CPT | Performed by: FAMILY MEDICINE

## 2018-08-10 PROCEDURE — 99213 OFFICE O/P EST LOW 20 MIN: CPT | Performed by: FAMILY MEDICINE

## 2018-08-10 PROCEDURE — G8427 DOCREV CUR MEDS BY ELIG CLIN: HCPCS | Performed by: FAMILY MEDICINE

## 2018-08-10 PROCEDURE — 4040F PNEUMOC VAC/ADMIN/RCVD: CPT | Performed by: FAMILY MEDICINE

## 2018-08-10 PROCEDURE — 1036F TOBACCO NON-USER: CPT | Performed by: FAMILY MEDICINE

## 2018-08-10 PROCEDURE — G8400 PT W/DXA NO RESULTS DOC: HCPCS | Performed by: FAMILY MEDICINE

## 2018-08-10 PROCEDURE — G8598 ASA/ANTIPLAT THER USED: HCPCS | Performed by: FAMILY MEDICINE

## 2018-08-10 PROCEDURE — 1090F PRES/ABSN URINE INCON ASSESS: CPT | Performed by: FAMILY MEDICINE

## 2018-08-10 PROCEDURE — G8417 CALC BMI ABV UP PARAM F/U: HCPCS | Performed by: FAMILY MEDICINE

## 2018-08-10 PROCEDURE — 1101F PT FALLS ASSESS-DOCD LE1/YR: CPT | Performed by: FAMILY MEDICINE

## 2018-08-10 RX ORDER — ALBUTEROL SULFATE 90 UG/1
2 AEROSOL, METERED RESPIRATORY (INHALATION) EVERY 4 HOURS PRN
Qty: 1 INHALER | Refills: 5 | Status: SHIPPED | OUTPATIENT
Start: 2018-08-10 | End: 2019-06-12

## 2018-08-10 RX ORDER — BENZONATATE 100 MG/1
100 CAPSULE ORAL 3 TIMES DAILY PRN
Qty: 20 CAPSULE | Refills: 0 | Status: SHIPPED | OUTPATIENT
Start: 2018-08-10 | End: 2018-08-17

## 2018-08-10 ASSESSMENT — ENCOUNTER SYMPTOMS
SPUTUM PRODUCTION: 0
COUGH: 1
SORE THROAT: 0
SWOLLEN GLANDS: 1
ABDOMINAL PAIN: 0
RHINORRHEA: 1

## 2018-08-10 NOTE — PROGRESS NOTES
Constitutional: She is oriented to person, place, and time. She appears well-developed and well-nourished. HENT:   Head: Normocephalic and atraumatic. Right Ear: Tympanic membrane normal. No drainage or swelling. No middle ear effusion. Decreased hearing is noted. Left Ear: There is swelling (small amount of canal edema is present). No drainage or tenderness. Decreased hearing (markedly, subjectively very minimal hearing is present) is noted. Left TM obscured by soft debris   Eyes: Conjunctivae and EOM are normal.   Neck: Normal range of motion. Neck supple. No JVD present. No thyromegaly present. Cardiovascular: Normal rate, regular rhythm and intact distal pulses. Exam reveals no gallop and no friction rub. No murmur heard. Pulmonary/Chest: Effort normal. No respiratory distress. She has wheezes (few posterior expiratory wheezes noted). Musculoskeletal: She exhibits no edema. Lymphadenopathy:     She has no cervical adenopathy. Neurological: She is alert and oriented to person, place, and time. Skin: Skin is warm. Psychiatric: She has a normal mood and affect. Her behavior is normal. Judgment and thought content normal.   Nursing note and vitals reviewed. Assessment/Plan:      Diagnosis Orders   1. Bronchitis  albuterol sulfate HFA (VENTOLIN HFA) 108 (90 Base) MCG/ACT inhaler    benzonatate (TESSALON) 100 MG capsule   2. Chronic otitis externa of both ears, unspecified type  Continue with the planned follow up with the ENT     Discussed the signs or sx to return including if there were fever, increasing SOB or increased sputum.     Lab Results   Component Value Date    WBC 9.29 07/19/2018    HGB 13.3 07/19/2018    HCT 39.7 07/19/2018     07/19/2018    CHOL 160 03/08/2018    TRIG 143 03/08/2018    HDL 52 02/27/2017    ALT 24 07/19/2018    AST 21 07/19/2018     07/19/2018    K 4.8 07/19/2018    CL 97 (L) 07/19/2018    CREATININE 0.5 07/19/2018    BUN 13 07/19/2018    CO2

## 2018-08-23 RX ORDER — GABAPENTIN 300 MG/1
300 CAPSULE ORAL DAILY
Qty: 30 CAPSULE | Refills: 1 | Status: SHIPPED | OUTPATIENT
Start: 2018-08-23 | End: 2018-12-18 | Stop reason: SDUPTHER

## 2018-09-01 RX ORDER — GABAPENTIN 300 MG/1
300 CAPSULE ORAL DAILY
Qty: 90 CAPSULE | Refills: 1 | OUTPATIENT
Start: 2018-09-01 | End: 2018-11-30

## 2018-09-10 ENCOUNTER — OFFICE VISIT (OUTPATIENT)
Dept: FAMILY MEDICINE CLINIC | Age: 83
End: 2018-09-10
Payer: MEDICARE

## 2018-09-10 VITALS
SYSTOLIC BLOOD PRESSURE: 124 MMHG | OXYGEN SATURATION: 94 % | BODY MASS INDEX: 34.2 KG/M2 | HEART RATE: 70 BPM | DIASTOLIC BLOOD PRESSURE: 74 MMHG | WEIGHT: 195.5 LBS

## 2018-09-10 DIAGNOSIS — R59.1 LYMPHADENOPATHY OF HEAD AND NECK: ICD-10-CM

## 2018-09-10 DIAGNOSIS — R29.898 WEAKNESS OF BOTH LOWER EXTREMITIES: ICD-10-CM

## 2018-09-10 DIAGNOSIS — F32.1 CURRENT MODERATE EPISODE OF MAJOR DEPRESSIVE DISORDER WITHOUT PRIOR EPISODE (HCC): ICD-10-CM

## 2018-09-10 DIAGNOSIS — E08.59 DIABETES MELLITUS DUE TO UNDERLYING CONDITION WITH OTHER CIRCULATORY COMPLICATIONS (HCC): ICD-10-CM

## 2018-09-10 LAB — HBA1C MFR BLD: 11.1 %

## 2018-09-10 PROCEDURE — G8598 ASA/ANTIPLAT THER USED: HCPCS | Performed by: FAMILY MEDICINE

## 2018-09-10 PROCEDURE — G8427 DOCREV CUR MEDS BY ELIG CLIN: HCPCS | Performed by: FAMILY MEDICINE

## 2018-09-10 PROCEDURE — 1036F TOBACCO NON-USER: CPT | Performed by: FAMILY MEDICINE

## 2018-09-10 PROCEDURE — G8417 CALC BMI ABV UP PARAM F/U: HCPCS | Performed by: FAMILY MEDICINE

## 2018-09-10 PROCEDURE — 99214 OFFICE O/P EST MOD 30 MIN: CPT | Performed by: FAMILY MEDICINE

## 2018-09-10 PROCEDURE — 1123F ACP DISCUSS/DSCN MKR DOCD: CPT | Performed by: FAMILY MEDICINE

## 2018-09-10 PROCEDURE — 83036 HEMOGLOBIN GLYCOSYLATED A1C: CPT | Performed by: FAMILY MEDICINE

## 2018-09-10 PROCEDURE — G8400 PT W/DXA NO RESULTS DOC: HCPCS | Performed by: FAMILY MEDICINE

## 2018-09-10 PROCEDURE — 1101F PT FALLS ASSESS-DOCD LE1/YR: CPT | Performed by: FAMILY MEDICINE

## 2018-09-10 PROCEDURE — 4040F PNEUMOC VAC/ADMIN/RCVD: CPT | Performed by: FAMILY MEDICINE

## 2018-09-10 PROCEDURE — 1090F PRES/ABSN URINE INCON ASSESS: CPT | Performed by: FAMILY MEDICINE

## 2018-09-10 RX ORDER — NEBIVOLOL 5 MG/1
2.5 TABLET ORAL DAILY
Qty: 30 TABLET | Refills: 0 | Status: SHIPPED
Start: 2018-09-10 | End: 2020-07-29 | Stop reason: SDUPTHER

## 2018-09-10 RX ORDER — DULOXETIN HYDROCHLORIDE 60 MG/1
60 CAPSULE, DELAYED RELEASE ORAL DAILY
Qty: 90 CAPSULE | Refills: 3 | Status: SHIPPED | OUTPATIENT
Start: 2018-09-10 | End: 2020-01-15 | Stop reason: SDUPTHER

## 2018-09-10 ASSESSMENT — ENCOUNTER SYMPTOMS
SHORTNESS OF BREATH: 0
COUGH: 0
DIARRHEA: 0
WHEEZING: 0
ABDOMINAL PAIN: 0
CONSTIPATION: 0

## 2018-09-10 NOTE — PROGRESS NOTES
 Omega-3 Fatty Acids (OMEGA 3 PO) Take  by mouth.  VITAMIN D-3 (COLECALCIFEROL) 400 UNITS TABS Take 2,000 Units by mouth daily.  clotrimazole (LOTRIMIN) 1 % cream Apply 1 strip topically as needed  0     No current facility-administered medications for this visit. Allergies   Allergen Reactions    Hydromorphone     Invokana [Canagliflozin]      yeast    Glucophage [Metformin Hcl] Nausea And Vomiting    Ketorolac Nausea And Vomiting       Health Maintenance   Topic Date Due    Flu vaccine (1) 09/01/2018    TSH testing  03/08/2019    DTaP/Tdap/Td vaccine (2 - Td) 04/17/2023    DEXA (modify frequency per FRAX score)  Completed    Pneumococcal low/med risk  Completed    Shingles Vaccine  Completed       Subjective:      Review of Systems  Respiratory: Negative for cough, shortness of breath and wheezing. Cardiovascular: Negative for chest pain, palpitations and leg swelling. Does have some feet swelling when its really hot   Gastrointestinal: Negative for abdominal pain, constipation and diarrhea. Genitourinary: Positive for frequency. Negative for urgency. Normal for me     Neurological: Negative for dizziness and headaches. My balance seems off once in a while and I kind of stagger, have done therapy for it in the past, but it still gets me sometimes. Objective:     /74   Pulse 70   Wt 195 lb 8 oz (88.7 kg)   LMP 04/24/1987 (Approximate)   SpO2 94%   BMI 34.20 kg/m²     Physical Exam   Constitutional: She is oriented to person, place, and time. She appears well-developed and well-nourished. HENT:   Head: Normocephalic. Eyes: Pupils are equal, round, and reactive to light. Conjunctivae are normal.   Neck: Neck supple. No thyromegaly present. Cardiovascular: Normal rate, regular rhythm and normal heart sounds. Pulmonary/Chest: Effort normal and breath sounds normal.   Musculoskeletal: Normal range of motion. She exhibits no edema. Lymphadenopathy:     She has cervical adenopathy (isolated 1 cm lymph node at the base of the right neck). Neurological: She is alert and oriented to person, place, and time. Psychiatric: She has a normal mood and affect. Her behavior is normal. Judgment and thought content normal.   Nursing note and vitals reviewed. Assessment/Plan:      Diagnosis Orders   1. Current moderate episode of major depressive disorder without prior episode ([facility])  DULoxetine (CYMBALTA) 60 MG extended release capsule-- start with the symbalta to try to help with her depressive sx as well as the neuropathy sx   2. Uncontrolled type 2 diabetes mellitus with diabetic polyneuropathy, with long-term current use of insulin ([facility])  POCT glycosylated hemoglobin (Hb A1C)   3. Weakness of both lower extremities  Vascular NKECHI-- rule out underlying vascs issues with her risk factors   4. Diabetes mellitus due to underlying condition with other circulatory complications ([facility])   Vascular NKECHI-- decrease the bystolic to see if this helps decrease the dizziness   5. Lymphadenopathy of head and neck  Monitor this spot and if not resovled in 1 month then would want an USN- patient has had the recent URI which may explain     Patient Instructions   Decrease the bystolic tablet to 1/2 daily. Will see how this affects your blood pressure. Watch the small spot at the base of the neck. Increase the duloxetine to 60 mg daily for the depression symptoms. New prescription for the 60 mg of the duloxetine.         Lab Results   Component Value Date    WBC 9.29 07/19/2018    HGB 13.3 07/19/2018    HCT 39.7 07/19/2018     07/19/2018    CHOL 160 03/08/2018    TRIG 143 03/08/2018    HDL 52 02/27/2017    ALT 24 07/19/2018    AST 21 07/19/2018     07/19/2018    K 4.8 07/19/2018    CL 97 (L) 07/19/2018    CREATININE 0.5 07/19/2018    BUN 13 07/19/2018    CO2 27 07/19/2018    TSH 0.13 (L) 03/08/2018    LABA1C 11.1 09/10/2018    LABA1C 10.2 03/07/2018    LABA1C 7.4 08/11/2017    LABMICR 2.2 (H) 03/08/2018       Return in about 4 weeks (around 10/8/2018). Patient given educational materials - see patient instructions. Discussed use, benefit, and side effects of prescribed medications. All patient questions answered. Pt voiced understanding. Reviewed health maintenance. Instructed to continue current medications, diet and exercise. Patient agreed with treatment plan. Follow up as directed.      Electronically signed by Brandan August MD on 9/16/2018

## 2018-09-26 PROBLEM — J32.9 SINUSITIS: Status: RESOLVED | Noted: 2017-05-25 | Resolved: 2018-09-26

## 2018-10-12 ENCOUNTER — OFFICE VISIT (OUTPATIENT)
Dept: FAMILY MEDICINE CLINIC | Age: 83
End: 2018-10-12
Payer: MEDICARE

## 2018-10-12 VITALS
BODY MASS INDEX: 34.32 KG/M2 | HEART RATE: 72 BPM | DIASTOLIC BLOOD PRESSURE: 76 MMHG | SYSTOLIC BLOOD PRESSURE: 126 MMHG | TEMPERATURE: 97.6 F | OXYGEN SATURATION: 96 % | WEIGHT: 196.19 LBS

## 2018-10-12 DIAGNOSIS — F32.4 MAJOR DEPRESSIVE DISORDER WITH SINGLE EPISODE, IN PARTIAL REMISSION (HCC): Primary | ICD-10-CM

## 2018-10-12 DIAGNOSIS — H69.83 DYSFUNCTION OF BOTH EUSTACHIAN TUBES: ICD-10-CM

## 2018-10-12 DIAGNOSIS — Z23 NEED FOR INFLUENZA VACCINATION: ICD-10-CM

## 2018-10-12 DIAGNOSIS — H60.393 OTHER INFECTIVE CHRONIC OTITIS EXTERNA OF BOTH EARS: ICD-10-CM

## 2018-10-12 PROCEDURE — G8598 ASA/ANTIPLAT THER USED: HCPCS | Performed by: FAMILY MEDICINE

## 2018-10-12 PROCEDURE — 4130F TOPICAL PREP RX AOE: CPT | Performed by: FAMILY MEDICINE

## 2018-10-12 PROCEDURE — G8417 CALC BMI ABV UP PARAM F/U: HCPCS | Performed by: FAMILY MEDICINE

## 2018-10-12 PROCEDURE — G8482 FLU IMMUNIZE ORDER/ADMIN: HCPCS | Performed by: FAMILY MEDICINE

## 2018-10-12 PROCEDURE — 99214 OFFICE O/P EST MOD 30 MIN: CPT | Performed by: FAMILY MEDICINE

## 2018-10-12 PROCEDURE — 1036F TOBACCO NON-USER: CPT | Performed by: FAMILY MEDICINE

## 2018-10-12 PROCEDURE — 1090F PRES/ABSN URINE INCON ASSESS: CPT | Performed by: FAMILY MEDICINE

## 2018-10-12 PROCEDURE — 1101F PT FALLS ASSESS-DOCD LE1/YR: CPT | Performed by: FAMILY MEDICINE

## 2018-10-12 PROCEDURE — 90662 IIV NO PRSV INCREASED AG IM: CPT | Performed by: FAMILY MEDICINE

## 2018-10-12 PROCEDURE — 4040F PNEUMOC VAC/ADMIN/RCVD: CPT | Performed by: FAMILY MEDICINE

## 2018-10-12 PROCEDURE — 1123F ACP DISCUSS/DSCN MKR DOCD: CPT | Performed by: FAMILY MEDICINE

## 2018-10-12 PROCEDURE — G0008 ADMIN INFLUENZA VIRUS VAC: HCPCS | Performed by: FAMILY MEDICINE

## 2018-10-12 PROCEDURE — G8400 PT W/DXA NO RESULTS DOC: HCPCS | Performed by: FAMILY MEDICINE

## 2018-10-12 PROCEDURE — G8427 DOCREV CUR MEDS BY ELIG CLIN: HCPCS | Performed by: FAMILY MEDICINE

## 2018-10-12 RX ORDER — TRIAMCINOLONE ACETONIDE 55 UG/1
2 SPRAY, METERED NASAL DAILY
Qty: 1 INHALER | Refills: 3 | COMMUNITY
Start: 2018-10-12 | End: 2020-05-15

## 2018-10-14 PROBLEM — F32.4 MAJOR DEPRESSIVE DISORDER WITH SINGLE EPISODE, IN PARTIAL REMISSION (HCC): Status: ACTIVE | Noted: 2018-10-14

## 2018-11-09 ENCOUNTER — OFFICE VISIT (OUTPATIENT)
Dept: FAMILY MEDICINE CLINIC | Age: 83
End: 2018-11-09
Payer: MEDICARE

## 2018-11-09 VITALS
DIASTOLIC BLOOD PRESSURE: 80 MMHG | BODY MASS INDEX: 35.07 KG/M2 | OXYGEN SATURATION: 96 % | HEART RATE: 72 BPM | SYSTOLIC BLOOD PRESSURE: 128 MMHG | WEIGHT: 200.5 LBS

## 2018-11-09 DIAGNOSIS — L40.9 PSORIASIS: ICD-10-CM

## 2018-11-09 DIAGNOSIS — F32.4 MAJOR DEPRESSIVE DISORDER WITH SINGLE EPISODE, IN PARTIAL REMISSION (HCC): Primary | ICD-10-CM

## 2018-11-09 DIAGNOSIS — E03.9 ACQUIRED HYPOTHYROIDISM: ICD-10-CM

## 2018-11-09 PROCEDURE — 1123F ACP DISCUSS/DSCN MKR DOCD: CPT | Performed by: FAMILY MEDICINE

## 2018-11-09 PROCEDURE — G8400 PT W/DXA NO RESULTS DOC: HCPCS | Performed by: FAMILY MEDICINE

## 2018-11-09 PROCEDURE — 4040F PNEUMOC VAC/ADMIN/RCVD: CPT | Performed by: FAMILY MEDICINE

## 2018-11-09 PROCEDURE — 1036F TOBACCO NON-USER: CPT | Performed by: FAMILY MEDICINE

## 2018-11-09 PROCEDURE — G8482 FLU IMMUNIZE ORDER/ADMIN: HCPCS | Performed by: FAMILY MEDICINE

## 2018-11-09 PROCEDURE — G8598 ASA/ANTIPLAT THER USED: HCPCS | Performed by: FAMILY MEDICINE

## 2018-11-09 PROCEDURE — G8427 DOCREV CUR MEDS BY ELIG CLIN: HCPCS | Performed by: FAMILY MEDICINE

## 2018-11-09 PROCEDURE — 1101F PT FALLS ASSESS-DOCD LE1/YR: CPT | Performed by: FAMILY MEDICINE

## 2018-11-09 PROCEDURE — 1090F PRES/ABSN URINE INCON ASSESS: CPT | Performed by: FAMILY MEDICINE

## 2018-11-09 PROCEDURE — G8417 CALC BMI ABV UP PARAM F/U: HCPCS | Performed by: FAMILY MEDICINE

## 2018-11-09 PROCEDURE — 99214 OFFICE O/P EST MOD 30 MIN: CPT | Performed by: FAMILY MEDICINE

## 2018-11-09 NOTE — PROGRESS NOTES
Wt 200 lb 8 oz (90.9 kg)   LMP 04/24/1987 (Approximate)   SpO2 96%   BMI 35.07 kg/m²     Physical Exam   Constitutional: She is oriented to person, place, and time. She appears well-developed and well-nourished. HENT:   Head: Normocephalic. Right Ear: No swelling or tenderness. No decreased hearing is noted. Left Ear: No swelling or tenderness. Decreased hearing is noted. Ears:    Ear canals are very narrow but also have edema and significant moisture but no excessive cerumen today   Eyes: Pupils are equal, round, and reactive to light. Conjunctivae are normal.   Neck: Neck supple. No thyromegaly present. Lymph node at the right base has been resolved   Cardiovascular: Normal rate, regular rhythm and normal heart sounds. Pulmonary/Chest: Effort normal and breath sounds normal.   Musculoskeletal: Normal range of motion. She exhibits no edema. Lymphadenopathy:     She has no cervical adenopathy. Neurological: She is alert and oriented to person, place, and time. Psychiatric: She has a normal mood and affect. Her behavior is normal. Judgment and thought content normal.   Nursing note and vitals reviewed. Assessment/Plan:      Diagnosis Orders   1. Major depressive disorder with single episode, in partial remission ([address]  Continue with the duloxetine, encouraged to consider counseling for spousal issues. Continue with volunteer issues   2. Psoriasis  fluocinolone 0.01 % cream- suspect ear dermatitis is psoriasis type but the hearing is a different issue   3. Uncontrolled type 2 diabetes mellitus with diabetic polyneuropathy, with long-term current use of insulin ([address]  Strongly encouraged the diet and increased activity for her diabetes   4.  Acquired hypothyroidism           Lab Results   Component Value Date    WBC 9.29 07/19/2018    HGB 13.3 07/19/2018    HCT 39.7 07/19/2018     07/19/2018    CHOL 160 03/08/2018    TRIG 143 03/08/2018    HDL 52 02/27/2017    ALT 24 07/19/2018    AST

## 2018-11-10 ASSESSMENT — ENCOUNTER SYMPTOMS
COUGH: 0
SHORTNESS OF BREATH: 0

## 2018-12-18 RX ORDER — GABAPENTIN 300 MG/1
CAPSULE ORAL
Qty: 30 CAPSULE | Refills: 1 | Status: SHIPPED | OUTPATIENT
Start: 2018-12-18 | End: 2018-12-26 | Stop reason: SDUPTHER

## 2018-12-26 NOTE — TELEPHONE ENCOUNTER
It looks like there may some confusion on this prescription-- [facility]. Is it to be a 90 day supply? Is it to be sent to [facility]? If [facility] is a new insurance starting Jan 1 does it need to wait to be sent until Jan 1? If [facility] is the old insurance does it need sent prior to Jan 1 for 90 days?

## 2018-12-29 RX ORDER — GABAPENTIN 300 MG/1
CAPSULE ORAL
Qty: 90 CAPSULE | Refills: 0 | Status: SHIPPED | OUTPATIENT
Start: 2018-12-29 | End: 2020-07-29 | Stop reason: SDUPTHER

## 2019-01-09 ENCOUNTER — OFFICE VISIT (OUTPATIENT)
Dept: FAMILY MEDICINE CLINIC | Age: 84
End: 2019-01-09
Payer: MEDICARE

## 2019-01-09 VITALS
DIASTOLIC BLOOD PRESSURE: 80 MMHG | WEIGHT: 198.31 LBS | BODY MASS INDEX: 34.69 KG/M2 | OXYGEN SATURATION: 95 % | SYSTOLIC BLOOD PRESSURE: 122 MMHG | HEART RATE: 85 BPM

## 2019-01-09 DIAGNOSIS — F32.4 MAJOR DEPRESSIVE DISORDER WITH SINGLE EPISODE, IN PARTIAL REMISSION (HCC): ICD-10-CM

## 2019-01-09 DIAGNOSIS — I25.10 ATHEROSCLEROSIS OF NATIVE CORONARY ARTERY OF NATIVE HEART WITHOUT ANGINA PECTORIS: ICD-10-CM

## 2019-01-09 DIAGNOSIS — K21.9 GASTROESOPHAGEAL REFLUX DISEASE WITHOUT ESOPHAGITIS: ICD-10-CM

## 2019-01-09 DIAGNOSIS — E03.9 ACQUIRED HYPOTHYROIDISM: ICD-10-CM

## 2019-01-09 PROCEDURE — 1090F PRES/ABSN URINE INCON ASSESS: CPT | Performed by: FAMILY MEDICINE

## 2019-01-09 PROCEDURE — 1101F PT FALLS ASSESS-DOCD LE1/YR: CPT | Performed by: FAMILY MEDICINE

## 2019-01-09 PROCEDURE — G8417 CALC BMI ABV UP PARAM F/U: HCPCS | Performed by: FAMILY MEDICINE

## 2019-01-09 PROCEDURE — G8598 ASA/ANTIPLAT THER USED: HCPCS | Performed by: FAMILY MEDICINE

## 2019-01-09 PROCEDURE — G8482 FLU IMMUNIZE ORDER/ADMIN: HCPCS | Performed by: FAMILY MEDICINE

## 2019-01-09 PROCEDURE — 1036F TOBACCO NON-USER: CPT | Performed by: FAMILY MEDICINE

## 2019-01-09 PROCEDURE — G8400 PT W/DXA NO RESULTS DOC: HCPCS | Performed by: FAMILY MEDICINE

## 2019-01-09 PROCEDURE — 99214 OFFICE O/P EST MOD 30 MIN: CPT | Performed by: FAMILY MEDICINE

## 2019-01-09 PROCEDURE — G8427 DOCREV CUR MEDS BY ELIG CLIN: HCPCS | Performed by: FAMILY MEDICINE

## 2019-01-09 PROCEDURE — 4040F PNEUMOC VAC/ADMIN/RCVD: CPT | Performed by: FAMILY MEDICINE

## 2019-01-09 PROCEDURE — 1123F ACP DISCUSS/DSCN MKR DOCD: CPT | Performed by: FAMILY MEDICINE

## 2019-01-09 RX ORDER — INSULIN GLARGINE 100 [IU]/ML
50 INJECTION, SOLUTION SUBCUTANEOUS NIGHTLY
Qty: 1 VIAL | Refills: 0 | Status: SHIPPED
Start: 2019-01-09 | End: 2019-05-10

## 2019-01-17 LAB
BASOPHILS ABSOLUTE: 0 10'3/UL (ref 0.1–0.2)
BASOPHILS RELATIVE PERCENT: 0.4 % (ref 0–1.7)
BUN BLDV-MCNC: 15 MG/DL (ref 7–18)
CALCIUM SERPL-MCNC: 8.7 MG/DL (ref 8.5–10.1)
CHLORIDE BLD-SCNC: 105 MMOL/L (ref 97–107)
CO2: 30 MMOL/L (ref 21–32)
CREAT SERPL-MCNC: 0.55 MG/DL (ref 0.55–1.02)
EOSINOPHILS ABSOLUTE: 0.1 10'3/UL (ref 0–0.4)
EOSINOPHILS RELATIVE PERCENT: 1 % (ref 0–6.4)
GFR CALCULATED: > 60
GLUCOSE: 121 MG/DL (ref 70–99)
HCT VFR BLD CALC: 42.4 % (ref 34.6–44.1)
HEMOGLOBIN: 13.8 G/DL (ref 11.7–14.9)
LYMPHOCYTES ABSOLUTE: 1.6 10'3/UL (ref 0.5–3.5)
LYMPHOCYTES RELATIVE PERCENT: 19.7 % (ref 17.4–45.9)
MCH RBC QN AUTO: 28.4 PG (ref 27.8–33.2)
MCHC RBC AUTO-ENTMCNC: 32.6 G/DL (ref 32.7–34.8)
MCV RBC AUTO: 86.9 FL (ref 83–97.4)
MONOCYTES ABSOLUTE: 0.9 10'3/UL (ref 0.2–0.8)
MONOCYTES RELATIVE PERCENT: 11.3 % (ref 4.4–12)
NEUTROPHILS ABSOLUTE: 5.6 10'3/UL (ref 1.5–5.6)
NEUTROPHILS SEGMENTED: 67.6 % (ref 41.2–72.1)
PDW BLD-RTO: 13.7 % (ref 12.2–15.8)
PLATELET # BLD: 249 10'3/UL (ref 122–359)
PMV BLD AUTO: 7.7 FL (ref 7.6–10.6)
POTASSIUM SERPL-SCNC: 3.7 MMOL/L (ref 3.5–5.1)
RBC # BLD: 4.88 10'6/UL (ref 3.85–4.88)
SODIUM BLD-SCNC: 142 MMOL/L (ref 136–145)
URINE CULTURE, ROUTINE: NORMAL
WBC: 8.3 10'3/UL (ref 3.2–9.3)

## 2019-01-23 ENCOUNTER — TELEPHONE (OUTPATIENT)
Dept: FAMILY MEDICINE CLINIC | Age: 84
End: 2019-01-23

## 2019-01-23 LAB
A/G RATIO: 1.1 RATIO
AGE FOR GFR: 83
ALBUMIN: 3.9 G/DL
ALK PHOSPHATASE: 93 UNITS/L
ALT SERPL-CCNC: 9 UNITS/L
ANION GAP SERPL CALCULATED.3IONS-SCNC: 11 MMOL/L
APPEARANCE: ABNORMAL
AST SERPL-CCNC: 13 UNITS/L
BACTERIA: ABNORMAL 1HPF
BILIRUB SERPL-MCNC: 0.8 MG/DL
BILIRUBIN: ABNORMAL
BLOOD: ABNORMAL
BUN BLDV-MCNC: 15 MG/DL
CALCIUM SERPL-MCNC: 9.3 MG/DL
CASTS: ABNORMAL /LPF
CHLORIDE BLD-SCNC: 97 MMOL/L
CK MB: <0.2 NG/ML
CO2: 28 MMOL/L
COLOR: YELLOW
CREAT SERPL-MCNC: 0.5 MG/DL
CRYSTALS: ABNORMAL /HPF
DIFFERENTIAL: MANUAL DIFF
EGFR BF: 143 ML/MIN/1.73 M2
EGFR BM: 192 ML/MIN/1.73 M2
EGFR WF: 118 ML/MIN/1.73 M2
EGFR WM: 159 ML/MIN/1.73 M2
EPITHELIAL CELLS, UA: ABNORMAL /HPF
GLOBULIN: 3.6 G/DL
GLUCOSE: 93 MG/DL
GLUCOSE: ABNORMAL MG/DL
HCT VFR BLD CALC: 41.7 %
HEMOGLOBIN: 13.3 G/DL
KETONES: ABNORMAL MG/DL
LEUKOCYTES, UA: ABNORMAL
LYMPHOCYTES # BLD: 7 %
MCH RBC QN AUTO: 27.2 PG
MCHC RBC AUTO-ENTMCNC: 32 G/DL
MCV RBC AUTO: 85.1 FL
MICROSCOPIC URINE: ABNORMAL
MONOCYTES: 8 %
MORPHOLOGY: NORMAL
MUCUS: ABNORMAL /HPF
MYOGLOBIN: 26.9 NG/ML
NEUTROPHILS: 85 %
NITRITE, URINE: ABNORMAL
PDW BLD-RTO: 11.8 %
PH: 7 PH
PLATELET # BLD: 254 THOU/MM3
PMV BLD AUTO: 7.4 FL
POTASSIUM SERPL-SCNC: 3.6 MMOL/L
PROTEIN,SCREEN: ABNORMAL MG/DL
RBC # BLD: 4.91 M/UL
RBC: ABNORMAL /HPF
SODIUM BLD-SCNC: 132 MMOL/L
SPECIFIC GRAVITY, URINE: 1.02 MG/DL
TOTAL CK: 36 UNITS/L
TOTAL PROTEIN: 7.5 G/DL
TROPONIN: <0.012 NG/ML
URINE CULTURE, ROUTINE: NORMAL
UROBILINOGEN, URINE: 1 MG/DL
WBC # BLD: 13.43 THOU/ML3
WBC URINE: ABNORMAL
YEAST: ABNORMAL /HPF

## 2019-01-24 ENCOUNTER — OFFICE VISIT (OUTPATIENT)
Dept: FAMILY MEDICINE CLINIC | Age: 84
End: 2019-01-24
Payer: MEDICARE

## 2019-01-24 VITALS
OXYGEN SATURATION: 96 % | HEART RATE: 100 BPM | TEMPERATURE: 98.3 F | BODY MASS INDEX: 32.18 KG/M2 | DIASTOLIC BLOOD PRESSURE: 80 MMHG | SYSTOLIC BLOOD PRESSURE: 128 MMHG | WEIGHT: 184 LBS

## 2019-01-24 DIAGNOSIS — E86.0 DEHYDRATION: ICD-10-CM

## 2019-01-24 DIAGNOSIS — N30.00 ACUTE CYSTITIS WITHOUT HEMATURIA: Primary | ICD-10-CM

## 2019-01-24 DIAGNOSIS — E78.2 MIXED HYPERLIPIDEMIA: ICD-10-CM

## 2019-01-24 PROCEDURE — 99999 PR OFFICE/OUTPT VISIT,PROCEDURE ONLY: CPT | Performed by: FAMILY MEDICINE

## 2019-01-25 LAB
AGE FOR GFR: 83
ANION GAP SERPL CALCULATED.3IONS-SCNC: 10 MMOL/L
BASOPHILS # BLD: 0.04 THOU/MM3
BUN BLDV-MCNC: 13 MG/DL
CALCIUM SERPL-MCNC: 8.5 MG/DL
CHLORIDE BLD-SCNC: 103 MMOL/L
CO2: 29 MMOL/L
CREAT SERPL-MCNC: 0.5 MG/DL
DIFFERENTIAL: AUTOMATED DIFF
EGFR BF: 143 ML/MIN/1.73 M2
EGFR BM: 192 ML/MIN/1.73 M2
EGFR WF: 118 ML/MIN/1.73 M2
EGFR WM: 159 ML/MIN/1.73 M2
EOSINOPHIL # BLD: 0.01 THOU/MM3
GLUCOSE: 111 MG/DL
HCT VFR BLD CALC: 36.4 %
HEMOGLOBIN: 11.7 G/DL
LYMPHOCYTES # BLD: 1.38 THOU/MM3
MCH RBC QN AUTO: 27.6 PG
MCHC RBC AUTO-ENTMCNC: 32 G/DL
MCV RBC AUTO: 86 FL
MONOCYTES # BLD: 0.92 THOU/MM3
NEUTROPHILS: 6.94 THOU/MM3
PDW BLD-RTO: 11.8 %
PLATELET # BLD: 248 THOU/MM3
PMV BLD AUTO: 7.6 FL
POTASSIUM SERPL-SCNC: 3.7 MMOL/L
RBC # BLD: 4.23 M/UL
SODIUM BLD-SCNC: 138 MMOL/L
WBC # BLD: 9.29 THOU/ML3

## 2019-01-28 ENCOUNTER — TELEPHONE (OUTPATIENT)
Dept: FAMILY MEDICINE CLINIC | Age: 84
End: 2019-01-28

## 2019-02-01 ENCOUNTER — OFFICE VISIT (OUTPATIENT)
Dept: FAMILY MEDICINE CLINIC | Age: 84
End: 2019-02-01
Payer: MEDICARE

## 2019-02-01 VITALS
DIASTOLIC BLOOD PRESSURE: 72 MMHG | HEART RATE: 73 BPM | OXYGEN SATURATION: 93 % | SYSTOLIC BLOOD PRESSURE: 126 MMHG | BODY MASS INDEX: 33.66 KG/M2 | WEIGHT: 192.44 LBS

## 2019-02-01 DIAGNOSIS — E86.0 DEHYDRATION: ICD-10-CM

## 2019-02-01 DIAGNOSIS — N30.01 ACUTE CYSTITIS WITH HEMATURIA: Primary | ICD-10-CM

## 2019-02-01 LAB
BILIRUBIN, POC: NORMAL
BLOOD URINE, POC: POSITIVE
CLARITY, POC: CLEAR
COLOR, POC: YELLOW
GLUCOSE URINE, POC: NORMAL
KETONES, POC: NORMAL
LEUKOCYTE EST, POC: NORMAL
NITRITE, POC: NORMAL
PH, POC: 5
PROTEIN, POC: NORMAL
SPECIFIC GRAVITY, POC: 1.01
URINE CULTURE, ROUTINE: NORMAL
UROBILINOGEN, POC: NORMAL

## 2019-02-01 PROCEDURE — 87086 URINE CULTURE/COLONY COUNT: CPT | Performed by: FAMILY MEDICINE

## 2019-02-01 PROCEDURE — 81002 URINALYSIS NONAUTO W/O SCOPE: CPT | Performed by: FAMILY MEDICINE

## 2019-02-01 PROCEDURE — 1111F DSCHRG MED/CURRENT MED MERGE: CPT | Performed by: FAMILY MEDICINE

## 2019-02-01 PROCEDURE — 99496 TRANSJ CARE MGMT HIGH F2F 7D: CPT | Performed by: FAMILY MEDICINE

## 2019-02-03 ENCOUNTER — TELEPHONE (OUTPATIENT)
Dept: FAMILY MEDICINE CLINIC | Age: 84
End: 2019-02-03

## 2019-02-04 ENCOUNTER — TELEPHONE (OUTPATIENT)
Dept: FAMILY MEDICINE CLINIC | Age: 84
End: 2019-02-04

## 2019-02-04 DIAGNOSIS — N30.00 ACUTE CYSTITIS WITHOUT HEMATURIA: Primary | ICD-10-CM

## 2019-02-04 RX ORDER — FLUCONAZOLE 150 MG/1
TABLET ORAL
Qty: 2 TABLET | Refills: 0 | Status: SHIPPED | OUTPATIENT
Start: 2019-02-04 | End: 2019-02-07

## 2019-02-04 RX ORDER — CIPROFLOXACIN 250 MG/1
250 TABLET, FILM COATED ORAL 2 TIMES DAILY
Qty: 14 TABLET | Refills: 0 | Status: SHIPPED | OUTPATIENT
Start: 2019-02-04 | End: 2019-02-11

## 2019-02-10 ASSESSMENT — ENCOUNTER SYMPTOMS
SHORTNESS OF BREATH: 0
CONSTIPATION: 0
DIARRHEA: 0
CHEST TIGHTNESS: 0
NAUSEA: 0
ABDOMINAL PAIN: 0
COUGH: 0

## 2019-05-10 ENCOUNTER — OFFICE VISIT (OUTPATIENT)
Dept: FAMILY MEDICINE CLINIC | Age: 84
End: 2019-05-10
Payer: MEDICARE

## 2019-05-10 VITALS
HEART RATE: 76 BPM | BODY MASS INDEX: 33.15 KG/M2 | WEIGHT: 189.5 LBS | DIASTOLIC BLOOD PRESSURE: 74 MMHG | SYSTOLIC BLOOD PRESSURE: 126 MMHG | OXYGEN SATURATION: 93 %

## 2019-05-10 DIAGNOSIS — E78.2 MIXED HYPERLIPIDEMIA: ICD-10-CM

## 2019-05-10 DIAGNOSIS — R26.89 BALANCE PROBLEM: ICD-10-CM

## 2019-05-10 DIAGNOSIS — I25.10 ATHEROSCLEROSIS OF NATIVE CORONARY ARTERY OF NATIVE HEART WITHOUT ANGINA PECTORIS: ICD-10-CM

## 2019-05-10 LAB — HBA1C MFR BLD: 10.9 %

## 2019-05-10 PROCEDURE — 99214 OFFICE O/P EST MOD 30 MIN: CPT | Performed by: FAMILY MEDICINE

## 2019-05-10 PROCEDURE — 83036 HEMOGLOBIN GLYCOSYLATED A1C: CPT | Performed by: FAMILY MEDICINE

## 2019-05-10 ASSESSMENT — ENCOUNTER SYMPTOMS
DIARRHEA: 0
WHEEZING: 0
COUGH: 0
CONSTIPATION: 0
SHORTNESS OF BREATH: 0
ABDOMINAL PAIN: 0

## 2019-05-10 NOTE — PATIENT INSTRUCTIONS
Finish the lantus and then try the [employer]-- check with the pharmacy to see if this is cheaper-- use also the 50 units of this. Use the fluticasone nasal spray daily for the ear issue and see if this helps.

## 2019-05-10 NOTE — PROGRESS NOTES
[address] [address] [address]  Dept: [phone]  Dept LYLA:[phone]    Sadaf Vaughn is a 80 y.o. female who presents today for her medical conditions/complaints as notedbelow. Sadaf Vaughn is c/o of 6 Month Follow-Up and Diabetes      HPI:     HPI    Continues to have the hearing issues and the ears repeatedly treated and cleaned. Continues with this. Some days can hear really well and other days is almost completely deaf. Uses the flonase once in awhile -- not regularly/     Also saw her Diabetic DrLeilani Recently. Her insurance changed. Now her insurance has changed and having trouble with the cost, working with the pharmacy to try to decrease this with the number of days etc. Has been turned down for the patient assistance program. She knows she does not eat as well as she should but has been trying to watch this as well in the last few weeks. Has done diet education multiple times and denies wanting to try this. Has been taking her blood pressure medications regularly. Also taking her pravastatin regularly without difficulty.      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)     Type II or unspecified type diabetes mellitus without mention of complication, not stated as uncontrolled     Unspecified sleep apnea       Past Surgical History:   Procedure Laterality Date    APPENDECTOMY      CARDIAC CATHETERIZATION  2010   [facility] CARDIAC SURGERY  04/02/2011    repaired aorta    CARPAL TUNNEL RELEASE      COLONOSCOPY  2010    multiple colon polyps - repeat every 3 years   [facility]      cataracts - Dr Leonard Aguirre    HYSTERECTOMY, TOTAL ABDOMINAL      hemorrhaging, complete    THORACIC AORTIC ANEURYSM REPAIR      2011    THYROID SURGERY  1960    gland removed       Family History   Problem Relation Age of Onset    Heart Disease Mother     Heart Disease Father     Breast Cancer Sister         10 years ago     No Known Problems Sister     No Known Problems Sister     No Known Problems Sister        Social History     Tobacco Use    Smoking status: Never Smoker    Smokeless tobacco: Never Used   Substance Use Topics    Alcohol use: No      Current Outpatient Medications   Medication Sig Dispense Refill    insulin glargine (TOUJEO MAX SOLOSTAR) 300 UNIT/ML injection pen Inject 50 Units into the skin nightly 2 pen 5    insulin regular human (HUMULIN R) 500 UNIT/ML concentrated injection vial Inject 80 Units into the skin 3 times daily (before meals) 3 vial 5    fluocinolone 0.01 % cream Apply topically 2 times daily 15 g 1    DULoxetine (CYMBALTA) 60 MG extended release capsule Take 1 capsule by mouth daily 90 capsule 3    nebivolol (BYSTOLIC) 5 MG tablet Take 0.5 tablets by mouth daily 30 tablet 0    Cyanocobalamin (B-12) 1000 MCG TBCR Take 1,000 mcg by mouth daily      Multiple Vitamins-Minerals (MULTIVITAMIN ADULT PO) Take 1 tablet by mouth daily      esomeprazole (NEXIUM) 40 MG delayed release capsule Take 1 capsule by mouth daily 90 capsule 3    ferrous sulfate 325 (65 Fe) MG tablet Take 1 tablet by mouth daily      pravastatin (PRAVACHOL) 40 MG tablet Take 1 tablet by mouth daily      oxybutynin (DITROPAN XL) 15 MG extended release tablet Take 1 tablet by mouth daily  0    clotrimazole (LOTRIMIN) 1 % cream Apply 1 strip topically as needed  0  b complex vitamins capsule Take 1 capsule by mouth daily      Insulin Pen Needle (NOVOTWIST) 32G X 5 MM MISC 1 each by Does not apply route daily      levothyroxine (SYNTHROID) 75 MCG tablet Take 75 mcg by mouth Daily.  aspirin 81 MG tablet Take 81 mg by mouth daily.  Omega-3 Fatty Acids (OMEGA 3 PO) Take  by mouth.  VITAMIN D-3 (COLECALCIFEROL) 400 UNITS TABS Take 2,000 Units by mouth daily.  gabapentin (NEURONTIN) 300 MG capsule TAKE 1 CAPSULE DAILY. 90 capsule 0    triamcinolone (NASACORT ALLERGY 24HR) 55 MCG/ACT nasal inhaler 2 sprays by Nasal route daily 1 Inhaler 3    albuterol sulfate HFA (VENTOLIN HFA) 108 (90 Base) MCG/ACT inhaler Inhale 2 puffs into the lungs every 4 hours as needed for Wheezing or Shortness of Breath 1 Inhaler 5     No current facility-administered medications for this visit. Allergies   Allergen Reactions    Hydromorphone     Invokana [Canagliflozin]      yeast    Glucophage [Metformin Hcl] Nausea And Vomiting    Ketorolac Nausea And Vomiting    Metformin      Other reaction(s): Intolerance-unknown  Other reaction(s): Nausea And Vomiting       Health Maintenance   Topic Date Due    TSH testing  03/08/2019    DTaP/Tdap/Td vaccine (2 - Td) 04/17/2023    DEXA (modify frequency per FRAX score)  Completed    Flu vaccine  Completed    Shingles Vaccine  Completed    Pneumococcal 65+ years Vaccine  Completed       Subjective:      Review of Systems  Respiratory: Negative for cough, shortness of breath and wheezing. Cardiovascular: Negative for chest pain, palpitations and leg swelling. Gastrointestinal: Negative for abdominal pain, constipation and diarrhea. Endocrine: Negative for polydipsia. Genitourinary: Positive for frequency and urgency. Urgency Comes and goes. Denies any pain. Neurological: Negative for dizziness and headaches.      Objective:     /74   Pulse 76   Wt 189 lb 8 oz (86 kg)   LMP 04/24/1987 (Approximate) SpO2 93%   BMI 33.15 kg/m²     Physical Exam   Constitutional: She is oriented to person, place, and time. She appears well-developed and well-nourished. HENT:   Head: Normocephalic. Right Ear: No swelling or tenderness. No decreased hearing is noted. Left Ear: No swelling or tenderness. Decreased hearing is noted. Ear canals are very narrow but also have edema and significant moisture but no excessive cerumen today   Eyes: Pupils are equal, round, and reactive to light. Conjunctivae are normal.   Neck: Neck supple. No thyromegaly present. Lymph node at the right base has been resolved   Cardiovascular: Normal rate, regular rhythm and normal heart sounds. Pulmonary/Chest: Effort normal and breath sounds normal.   Abdominal: Soft. Musculoskeletal: Normal range of motion. She exhibits no edema. Lymphadenopathy:     She has no cervical adenopathy. Neurological: She is alert and oriented to person, place, and time. Skin: No erythema. Psychiatric: She has a normal mood and affect. Her behavior is normal. Judgment and thought content normal.   Nursing note and vitals reviewed. Assessment/Plan:      Diagnosis Orders   1. 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 Also offered dietary referral again,   2. Mixed hyperlipidemia  Continue on the pravastatin at this time   3. Balance problem  Continue to use caution with the walking, suspect this is multifactorial but is stable at this time   4. Atherosclerosis of native coronary artery of native heart without angina pectoris  Continue with risk factor modification     Finish the lantus and then try the [employer] Max pen-- check with the pharmacy to see if this is cheaper-- use also the 50 units of this.      Use the fluticasone nasal spray daily for the ear issue and see if this helps. Lab Results   Component Value Date    WBC 9.29 01/25/2019    HGB 11.7 (L) 01/25/2019    HCT 36.4 (L) 01/25/2019     01/25/2019    CHOL 160 03/08/2018    TRIG 143 03/08/2018    HDL 52 02/27/2017    ALT 9 01/23/2019    AST 13 (L) 01/23/2019     01/25/2019    K 3.7 01/25/2019     01/25/2019    CREATININE 0.5 01/25/2019    BUN 13 01/25/2019    CO2 29 01/25/2019    TSH 0.13 (L) 03/08/2018    LABA1C 10.9 05/10/2019    LABA1C 11.1 09/10/2018    LABA1C 10.2 03/07/2018    LABMICR 2.2 (H) 03/08/2018       Return in about 4 months (around 9/10/2019) for DM follow up, AWV. Patient given educational materials - see patientinstructions. Discussed use, benefit, and side effects of prescribed medications. All patient questions answered. Pt voiced understanding. Reviewed health maintenance. Instructed to continue current medications, diet andexercise. Patient agreed with treatment plan. Follow up as directed.      Electronically signed by Heidi Vergara MD on 5/12/2019

## 2019-05-10 NOTE — PROGRESS NOTES
Review of Systems   Respiratory: Negative for cough, shortness of breath and wheezing. Cardiovascular: Negative for chest pain, palpitations and leg swelling. Gastrointestinal: Negative for abdominal pain, constipation and diarrhea. Endocrine: Negative for polydipsia. Genitourinary: Positive for frequency and urgency. Urgency Comes and goes. Denies any pain. Neurological: Negative for dizziness and headaches.

## 2019-05-23 ENCOUNTER — TELEPHONE (OUTPATIENT)
Dept: FAMILY MEDICINE CLINIC | Age: 84
End: 2019-05-23

## 2019-05-29 ENCOUNTER — TELEPHONE (OUTPATIENT)
Dept: FAMILY MEDICINE CLINIC | Age: 84
End: 2019-05-29

## 2019-05-29 RX ORDER — AMOXICILLIN 875 MG/1
TABLET, COATED ORAL
Refills: 0 | COMMUNITY
Start: 2019-05-22 | End: 2019-06-12

## 2019-05-29 RX ORDER — ASPIRIN 81 MG/1
TABLET, CHEWABLE ORAL
COMMUNITY

## 2019-05-29 RX ORDER — CALCIUM CITRATE/VITAMIN D3 200MG-6.25
TABLET ORAL
Refills: 0 | COMMUNITY
Start: 2019-04-30

## 2019-05-30 ENCOUNTER — OFFICE VISIT (OUTPATIENT)
Dept: FAMILY MEDICINE CLINIC | Age: 84
End: 2019-05-30
Payer: MEDICARE

## 2019-05-30 ENCOUNTER — TELEPHONE (OUTPATIENT)
Dept: FAMILY MEDICINE CLINIC | Age: 84
End: 2019-05-30

## 2019-05-30 VITALS
SYSTOLIC BLOOD PRESSURE: 128 MMHG | DIASTOLIC BLOOD PRESSURE: 72 MMHG | OXYGEN SATURATION: 92 % | BODY MASS INDEX: 30.61 KG/M2 | WEIGHT: 175 LBS | HEART RATE: 94 BPM

## 2019-05-30 DIAGNOSIS — F32.4 MAJOR DEPRESSIVE DISORDER WITH SINGLE EPISODE, IN PARTIAL REMISSION (HCC): ICD-10-CM

## 2019-05-30 DIAGNOSIS — E03.9 ACQUIRED HYPOTHYROIDISM: ICD-10-CM

## 2019-05-30 DIAGNOSIS — E16.2 HYPOGLYCEMIA: ICD-10-CM

## 2019-05-30 DIAGNOSIS — N39.0 RECURRENT UTI: ICD-10-CM

## 2019-05-30 PROBLEM — H90.A32 MIXED CONDUCTIVE AND SENSORINEURAL HEARING LOSS OF LEFT EAR WITH RESTRICTED HEARING OF RIGHT EAR: Status: ACTIVE | Noted: 2018-08-27

## 2019-05-30 PROBLEM — Z86.79 H/O THORACIC AORTIC ANEURYSM REPAIR: Status: ACTIVE | Noted: 2018-03-26

## 2019-05-30 PROBLEM — I34.0 NONRHEUMATIC MITRAL (VALVE) INSUFFICIENCY: Status: ACTIVE | Noted: 2019-05-30

## 2019-05-30 PROBLEM — Z98.890 H/O THORACIC AORTIC ANEURYSM REPAIR: Status: ACTIVE | Noted: 2018-03-26

## 2019-05-30 LAB
BACTERIA URINE, POC: NORMAL
BILIRUBIN URINE: 0.2 MG/DL
BLOOD, URINE: POSITIVE
CASTS URINE, POC: NORMAL
CLARITY: CLEAR
COLOR: NORMAL
CRYSTALS URINE, POC: NORMAL
EPI CELLS URINE, POC: NORMAL
GLUCOSE URINE: NORMAL
KETONES, URINE: NEGATIVE
LEUKOCYTE EST, POC: NORMAL
NITRITE, URINE: NEGATIVE
PH UA: 6 (ref 4.5–8)
PROTEIN UA: NEGATIVE
RBC URINE, POC: NORMAL
SPECIFIC GRAVITY UA: 1 (ref 1–1.03)
UROBILINOGEN, URINE: NORMAL
WBC URINE, POC: NORMAL
YEAST URINE, POC: NORMAL

## 2019-05-30 PROCEDURE — 81000 URINALYSIS NONAUTO W/SCOPE: CPT | Performed by: FAMILY MEDICINE

## 2019-05-30 PROCEDURE — 99495 TRANSJ CARE MGMT MOD F2F 14D: CPT | Performed by: FAMILY MEDICINE

## 2019-05-30 NOTE — PROGRESS NOTES
Post-Discharge Transitional Care Management Services or Hospital Follow Up      Hanh Ortiz   YOB: 1935    Date of Office Visit:  5/30/2019  Date of Hospital Admission: 5/18/2019  Date of Hospital Discharge: 5/22/2019    Care management risk score Rising risk (score 2-5) and Complex Care (Scores >=6): 2     Non face to face  following discharge, date last encounter closed (first attempt may have been earlier):5/23/2019  Call initiated 2 business days of discharge: Yes    Patient Active Problem List   Diagnosis    Otitis externa    GERD (gastroesophageal reflux disease)    Coronary atherosclerosis of native coronary artery    Balance problem    Hypothyroidism    Hyperlipemia    Uncontrolled type 2 diabetes mellitus with diabetic polyneuropathy, with long-term current use of insulin ([address]    Major depressive disorder with single episode, in partial remission ([address]    H/O thoracic aortic aneurysm repair    Mixed conductive and sensorineural hearing loss of left ear with restricted hearing of right ear    Nonrheumatic mitral (valve) insufficiency       Allergies   Allergen Reactions    Hydromorphone     Invokana [Canagliflozin]      yeast    Glucophage [Metformin Hcl] Nausea And Vomiting    Ketorolac Nausea And Vomiting    Metformin      Other reaction(s): Intolerance-unknown  Other reaction(s): Nausea And Vomiting       Medications listed as ordered at the time of discharge from [facility]      Medications marked \"taking\" at this time  Outpatient Medications Marked as Taking for the 5/30/19 encounter (Office Visit) with Milena Mireles MD   Medication Sig Dispense Refill    insulin glargine (TOUJEO MAX SOLOSTAR) 300 UNIT/ML injection pen Inject 60 Units into the skin nightly 3 pen 3    Insulin Pen Needle 30G X 5 MM MISC 1 each by Does not apply route 4 times daily 400 each 5    aspirin 81 MG chewable tablet Take 81 mg by mouth daily.       TRUE METRIX BLOOD [address], possible UTI, thought she is to see a bladder doctor, tired all the time,        Had been feeling good until that Thursday and had terrible chills. Put it off until Saturday am and same to the emergency room. Did not have any sx of a bladder infection then but did have the severe fatigue and the lower back ache. Had noticed some increase in frequency of urination and then started vomiting with the infection as well. On her admission also had a sugar as low as 35. Even wet herself. Inpatient course: Discharge summary reviewed- see chart. Interval history/Current status: still feeling very tired and rundown. Is still washing the dishes and cooking the meals and is able to do this. Is gradually feeling better but the sugars are still really high-- is on a lot less insulin than she was prior to her admission. Was on 50 units of lantus daily with 50 units of the humulin R 500 with each meal prior to her admission. Now is on the sliding scale with her meals with the lantus only-- this was effective in the hospital but since she has been home has been eating more. , differently    Vitals:    05/30/19 1112   BP: 128/72   Site: Left Upper Arm   Position: Sitting   Cuff Size: Medium Adult   Pulse: 94   SpO2: 92%   Weight: 175 lb (79.4 kg)     Body mass index is 30.61 kg/m². Wt Readings from Last 3 Encounters:   05/30/19 175 lb (79.4 kg)   05/10/19 189 lb 8 oz (86 kg)   02/01/19 192 lb 7 oz (87.3 kg)     BP Readings from Last 3 Encounters:   05/30/19 128/72   05/10/19 126/74   02/01/19 126/72       Review of Systems    Physical Exam   Constitutional: She is oriented to person, place, and time. She appears well-developed and well-nourished. HENT:   Head: Normocephalic and atraumatic. Decreased hearing bilaterally   Eyes: Conjunctivae and EOM are normal.   Neck: Normal range of motion. Neck supple. No JVD present. No thyromegaly present.    Cardiovascular: Normal rate, regular rhythm and

## 2019-05-30 NOTE — TELEPHONE ENCOUNTER
[address]     Do not have the 30 gauge 5 mm pen needles are the 31 gauge 6 mm needles ok  Also, is the Toujeo regular or is it suppose to be [facility] Max?

## 2019-05-30 NOTE — PATIENT INSTRUCTIONS
Increase the daily insulin to Toujeo 60 units -- this replaces the lantus to 60units and continue with the humulin R sliding scale as you currently are. Drop off the sugars to me in 1 week. You will need to get back to Dr. Lindsay Jones in the next month but We need to avoid the low blood sugars in the future. If you are not eating then need to cut the dose of the Humulin R down and watch the sugars closely.

## 2019-05-31 ENCOUNTER — TELEPHONE (OUTPATIENT)
Dept: FAMILY MEDICINE CLINIC | Age: 84
End: 2019-05-31

## 2019-05-31 NOTE — TELEPHONE ENCOUNTER
Blood sugar this morning was 530, what should we do for her? Spoke with Dr. Jd Lanza per verbal order she requests 20 units of Humulin R and 60 units of either toujeo or lantus whichever she has at home currently. Notified. Alma Tucker states it will be 60 units of lantus, we havent gotten her new script yet, I will pick it up today.

## 2019-06-12 ENCOUNTER — OFFICE VISIT (OUTPATIENT)
Dept: OTOLARYNGOLOGY | Age: 84
End: 2019-06-12
Payer: MEDICARE

## 2019-06-12 VITALS
BODY MASS INDEX: 39.36 KG/M2 | RESPIRATION RATE: 12 BRPM | WEIGHT: 225 LBS | SYSTOLIC BLOOD PRESSURE: 128 MMHG | DIASTOLIC BLOOD PRESSURE: 78 MMHG | HEART RATE: 72 BPM

## 2019-06-12 DIAGNOSIS — H60.393 OTHER INFECTIVE CHRONIC OTITIS EXTERNA OF BOTH EARS: ICD-10-CM

## 2019-06-12 DIAGNOSIS — R42 DIZZINESS: Primary | ICD-10-CM

## 2019-06-12 PROCEDURE — 99203 OFFICE O/P NEW LOW 30 MIN: CPT | Performed by: OTOLARYNGOLOGY

## 2019-06-12 RX ORDER — PREDNISOLONE ACETATE 10 MG/ML
SUSPENSION/ DROPS OPHTHALMIC
Qty: 1 BOTTLE | Refills: 1 | Status: SHIPPED | OUTPATIENT
Start: 2019-06-12 | End: 2019-06-19

## 2019-06-12 RX ORDER — PREDNISOLONE ACETATE 10 MG/ML
SUSPENSION/ DROPS OPHTHALMIC
Qty: 1 BOTTLE | Refills: 1 | Status: SHIPPED | OUTPATIENT
Start: 2019-06-12 | End: 2019-06-14 | Stop reason: SDUPTHER

## 2019-06-12 NOTE — PROGRESS NOTES
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)     Type II or unspecified type diabetes mellitus without mention of complication, not stated as uncontrolled     Unspecified sleep apnea         Past Surgical History:   Procedure Laterality Date    APPENDECTOMY      CARDIAC CATHETERIZATION  2010    CARDIAC SURGERY  04/02/2011    repaired aorta    CARPAL TUNNEL RELEASE      COLONOSCOPY  2010    multiple colon polyps - repeat every 3 years   [facility]      cataracts - Dr Belia Chris, TOTAL ABDOMINAL      hemorrhaging, complete    THORACIC AORTIC ANEURYSM REPAIR      2011    THYROID SURGERY  1960    gland removed       Family History:     Family History   Problem Relation Age of Onset    Heart Disease Mother     Heart Disease Father     Breast Cancer Sister         10 years ago     No Known Problems Sister     No Known Problems Sister     No Known Problems Sister        Social Hx:     Social History     Socioeconomic History    Marital status:      Spouse name: Not on file    Number of children: Not on file    Years of education: Not on file    Highest education level: Not on file   Occupational History    Not on file   Social Needs    Financial resource strain: Not on file    Food insecurity:     Worry: Not on file     Inability: Not on file    Transportation needs:     Medical: Not on file     Non-medical: Not on file   Tobacco Use Tonsils:      FOM:  Normal     Other:      Neck:    Thyroid:Normal       Lymph nodes: Normal           Trachea:  Normal      Masses:  Normal    Other:        Eyes:    EOMs:      Nystagmus:      Neurological:    CN V:      CN VII:       Gait & Station:      Romberg:      Tandem Gait:      [place]:       Oriented x 3: Normal     Affect:  Normal    Data reviewed:      ASSESSMENT:NEGRITO, dizziness   Diagnosis Orders   1. Dizziness  VL DUP CAROTID BILATERAL   2. Other infective chronic otitis externa of both ears         PLAN:carotid studies, Pred eye gtts, see tomorrow for wick removal  Return in about 1 day (around 6/13/2019).     Orders Placed This Encounter   Medications    prednisoLONE acetate (PRED FORTE) 1 % ophthalmic suspension     Sig: 3 drops to affected ear at bedtime     Dispense:  1 Bottle     Refill:  1    prednisoLONE acetate (PRED FORTE) 1 % ophthalmic suspension     Sig: 3 drops to affected ear at bedtime     Dispense:  1 Bottle     Refill:  1    prednisoLONE acetate (PRED FORTE) 1 % ophthalmic suspension     Sig: 3 drops to affected ear at bedtime     Dispense:  1 Bottle     Refill:  1         Leon Byrd MD

## 2019-06-13 ENCOUNTER — TELEPHONE (OUTPATIENT)
Dept: FAMILY MEDICINE CLINIC | Age: 84
End: 2019-06-13

## 2019-06-13 ENCOUNTER — OFFICE VISIT (OUTPATIENT)
Dept: OTOLARYNGOLOGY | Age: 84
End: 2019-06-13

## 2019-06-13 ENCOUNTER — HOSPITAL ENCOUNTER (OUTPATIENT)
Dept: INTERVENTIONAL RADIOLOGY/VASCULAR | Age: 84
Discharge: HOME | End: 2019-06-15
Payer: MEDICARE

## 2019-06-13 VITALS
HEIGHT: 64 IN | DIASTOLIC BLOOD PRESSURE: 82 MMHG | BODY MASS INDEX: 28.95 KG/M2 | SYSTOLIC BLOOD PRESSURE: 128 MMHG | RESPIRATION RATE: 16 BRPM | TEMPERATURE: 98.1 F | HEART RATE: 80 BPM | WEIGHT: 169.6 LBS

## 2019-06-13 DIAGNOSIS — H60.393 OTHER INFECTIVE CHRONIC OTITIS EXTERNA OF BOTH EARS: Primary | ICD-10-CM

## 2019-06-13 DIAGNOSIS — R42 DIZZINESS: ICD-10-CM

## 2019-06-13 PROCEDURE — 93880 EXTRACRANIAL BILAT STUDY: CPT

## 2019-06-13 PROCEDURE — 99024 POSTOP FOLLOW-UP VISIT: CPT | Performed by: OTOLARYNGOLOGY

## 2019-06-13 NOTE — PROGRESS NOTES
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)     Type II or unspecified type diabetes mellitus without mention of complication, not stated as uncontrolled     Unspecified sleep apnea         Past Surgical History:   Procedure Laterality Date    APPENDECTOMY      CARDIAC CATHETERIZATION  2010    CARDIAC SURGERY  04/02/2011    repaired aorta    CARPAL TUNNEL RELEASE      COLONOSCOPY  2010    multiple colon polyps - repeat every 3 years   [facility] Ross      cataracts - Dr Nico Blankenship, TOTAL ABDOMINAL      hemorrhaging, complete    THORACIC AORTIC ANEURYSM REPAIR      2011    THYROID SURGERY  1960    gland removed       Family History:     Family History   Problem Relation Age of Onset    Heart Disease Mother     Heart Disease Father     Breast Cancer Sister         10 years ago     No Known Problems Sister     No Known Problems Sister     No Known Problems Sister        Social Hx:     Social History     Socioeconomic History    Marital status:      Spouse name: Not on file    Number of children: Not on file    Years of education: Not on file    Highest education level: Not on file   Occupational History    Not on file   Social Needs    Financial resource strain: Not on file    Food insecurity:     Worry: Not on file     Inability: Not on file    Transportation needs:     Medical: Not on file     Non-medical: Not on file   Tobacco Use    Smoking status: Never Smoker    Smokeless tobacco: Never Used   Substance and Sexual Activity    Alcohol use: No    Drug use: No    Sexual activity: Not on file   Lifestyle    Physical activity:     Days per week: Not on file     Minutes per session: Not on file    Stress: Not on file   Relationships    Social connections:     Talks on phone: Not on file     Gets together: Not on file     Attends [employer] service: Not on file     Active member of club or organization: Not on file     Attends meetings of clubs or organizations: Not on file     Relationship status: Not on file    Intimate partner violence:     Fear of current or ex partner: Not on file     Emotionally abused: Not on file     Physically abused: Not on file     Forced sexual activity: Not on file   Other Topics Concern    Not on file   Social History Narrative    Not on file       Current Medications:     Current Outpatient Medications:     prednisoLONE acetate (PRED FORTE) 1 % ophthalmic suspension, 3 drops to affected ear at bedtime, Disp: 1 Bottle, Rfl: 1    prednisoLONE acetate (PRED FORTE) 1 % ophthalmic suspension, 3 drops to affected ear at bedtime, Disp: 1 Bottle, Rfl: 1    prednisoLONE acetate (PRED FORTE) 1 % ophthalmic suspension, 3 drops to affected ear at bedtime, Disp: 1 Bottle, Rfl: 1    insulin glargine (TOUJEO MAX SOLOSTAR) 300 UNIT/ML injection pen, Inject 60 Units into the skin nightly, Disp: 3 pen, Rfl: 3    Insulin Pen Needle 30G X 5 MM MISC, 1 each by Does not apply route 4 times daily, Disp: 400 each, Rfl: 5    aspirin 81 MG chewable tablet, Take 81 mg by mouth daily. , Disp: , Rfl:     TRUE METRIX BLOOD GLUCOSE TEST strip, TEST four times a day, Disp: , Rfl: 0    insulin regular human (HUMULIN R) 500 UNIT/ML concentrated injection vial, Inject 80 Units into the skin 3 times daily (before meals), Disp: 3 vial, Rfl: 5    triamcinolone (NASACORT ALLERGY 24HR) 55 MCG/ACT nasal inhaler, 2 sprays by Nasal route daily, Disp: 1 Inhaler, Rfl: 3    DULoxetine (CYMBALTA) 60 MG extended release capsule, Take 1 capsule by mouth daily, Disp: 90 capsule, Rfl: 3    nebivolol (BYSTOLIC) 5 MG tablet, Take 0.5 tablets by mouth daily, Disp: 30 tablet, Rfl: 0    Cyanocobalamin (B-12) 1000 MCG TBCR, Take 1,000 mcg by mouth daily, Disp: , Rfl:     Multiple Vitamins-Minerals (MULTIVITAMIN ADULT PO), Take 1 tablet by mouth daily, Disp: , Rfl:     esomeprazole (NEXIUM) 40 MG delayed release capsule, Take 1 capsule by mouth daily, Disp: 90 capsule, Rfl: 3    ferrous sulfate 325 (65 Fe) MG tablet, Take 1 tablet by mouth daily, Disp: , Rfl:     pravastatin (PRAVACHOL) 40 MG tablet, Take 1 tablet by mouth daily, Disp: , Rfl:     oxybutynin (DITROPAN XL) 15 MG extended release tablet, Take 1 tablet by mouth daily, Disp: , Rfl: 0    b complex vitamins capsule, Take 1 capsule by mouth daily, Disp: , Rfl:     levothyroxine (SYNTHROID) 75 MCG tablet, Take 75 mcg by mouth Daily. , Disp: , Rfl:     Omega-3 Fatty Acids (OMEGA 3 PO), Take  by mouth., Disp: , Rfl:     VITAMIN D-3 (COLECALCIFEROL) 400 UNITS TABS, Take 2,000 Units by mouth daily. , Disp: , Rfl:     gabapentin (NEURONTIN) 300 MG capsule, TAKE 1 CAPSULE DAILY. , Disp: 90 capsule, Rfl: 0     ROS:   CV:    Endocrine:    Resp:     GI:       Neuro:   PE:     General appearance:  Normal                 Ability to Communicate:  Normal       Head & Face:  Normal   Salivary Glands:  Normal              Facial Strength:  Normal   Ears:    Pinna:  Normal            EAC:  AD NEGRITO w debris, AS wick removed, has NEGRITO     TMs:  Normal       [place]:  Vizcarra Rinne     Finger Rub     Nose:    External: Normal    Septum:  Normal    Turbinates: Normal             Nasal Cavity: Normal         Naso Pharyngoscopy:     Nasal Endoscopy:      Oral Cavity & Oral Pharynx:    Tongue:  Normal    Teeth & Gums:             Palate:  Stacy     Tonsils:      FOM:  Normal     Other:      Neck:    Thyroid:Normal Lymph nodes: Normal           Trachea:  Normal      Masses:  Normal    Other:        Eyes:    EOMs:      Nystagmus:      Neurological:    CN V:      CN VII:       Gait & Station:      Romberg:      Tandem Gait:      Halp[place]:       Oriented x 3: Normal     Affect:  Normal    Data reviewed:      ASSESSMENT:   Diagnosis Orders   1. Other infective chronic otitis externa of both ears         PLAN:start ciprodex AU, see next mos  Return in about 1 month (around 7/11/2019). No orders of the defined types were placed in this encounter.         Veronica Kidd MD

## 2019-06-14 ENCOUNTER — OFFICE VISIT (OUTPATIENT)
Dept: FAMILY MEDICINE CLINIC | Age: 84
End: 2019-06-14
Payer: MEDICARE

## 2019-06-14 VITALS
SYSTOLIC BLOOD PRESSURE: 132 MMHG | BODY MASS INDEX: 30.55 KG/M2 | HEART RATE: 72 BPM | DIASTOLIC BLOOD PRESSURE: 78 MMHG | WEIGHT: 178 LBS | TEMPERATURE: 98 F

## 2019-06-14 DIAGNOSIS — L40.8 INVERSE PSORIASIS: ICD-10-CM

## 2019-06-14 DIAGNOSIS — N39.0 URINARY TRACT INFECTION WITH HEMATURIA, SITE UNSPECIFIED: ICD-10-CM

## 2019-06-14 DIAGNOSIS — R31.9 URINARY TRACT INFECTION WITH HEMATURIA, SITE UNSPECIFIED: ICD-10-CM

## 2019-06-14 LAB
BACTERIA URINE, POC: ABNORMAL
BILIRUBIN URINE: 0.2 MG/DL
BLOOD, URINE: POSITIVE
CASTS URINE, POC: ABNORMAL
CLARITY: ABNORMAL
COLOR: ABNORMAL
CRYSTALS URINE, POC: ABNORMAL
EPI CELLS URINE, POC: ABNORMAL
GLUCOSE URINE: ABNORMAL
KETONES, URINE: POSITIVE
LEUKOCYTE EST, POC: ABNORMAL
NITRITE, URINE: POSITIVE
PH UA: 5 (ref 4.5–8)
PROTEIN UA: ABNORMAL
RBC URINE, POC: ABNORMAL
SPECIFIC GRAVITY UA: 1.01 (ref 1–1.03)
URINE CULTURE, ROUTINE: NORMAL
UROBILINOGEN, URINE: ABNORMAL
WBC URINE, POC: ABNORMAL
YEAST URINE, POC: ABNORMAL

## 2019-06-14 PROCEDURE — 81000 URINALYSIS NONAUTO W/SCOPE: CPT | Performed by: FAMILY MEDICINE

## 2019-06-14 PROCEDURE — 99214 OFFICE O/P EST MOD 30 MIN: CPT | Performed by: FAMILY MEDICINE

## 2019-06-14 RX ORDER — FLUCONAZOLE 150 MG/1
150 TABLET ORAL
Qty: 3 TABLET | Refills: 0 | Status: SHIPPED | OUTPATIENT
Start: 2019-06-14 | End: 2019-06-17

## 2019-06-14 RX ORDER — FLUOCINOLONE ACETONIDE 0.25 MG/G
OINTMENT TOPICAL
Qty: 60 G | Refills: 1 | Status: SHIPPED | OUTPATIENT
Start: 2019-06-14 | End: 2020-05-15

## 2019-06-14 NOTE — PROGRESS NOTES
[id number]  Dept: [phone]  Dept BNW:[phone]    Hilary Montelongo is a 80 y.o. female who presents today for her medical conditions/complaints as notedbelow. Hilary Montelongo is c/o of Other (pubic area is red and irritated and it burns)      HPI:     HPI    Has been having itching in the perineal area since she was in the hospital it has increased significantly. This did start before she was in the hospital but definitely increased after she was treated with the antibiotics. He has not had any bleeding is noted fevers or chills had not has not had dysuria. Is not had urinary frequency/ urgency. Feels very sore and itchy. Started before she was even in the hospital  When she urinates she blots, itches and burns. Then bought some powder and this did not help. Sugars are running really high. Over 400 at this time. Taking her to [facility] regularly as well as her sliding scale. She has not been watching her diet as well as she should be and has a follow-up with her endocrinologist in about 6 weeks. Not had symptoms from the high blood sugars has been trying to gradually increase her activity. Went to see Dr. Shannan Alicia-- had a carotid doppler done also. Also prescribed ear drops.     BP Readings from Last 3 Encounters:   06/14/19 132/78   06/13/19 128/82   06/12/19 128/78          (goal 120/80)    Wt Readings from Last 3 Encounters:   06/14/19 178 lb (80.7 kg)   06/13/19 169 lb 9.6 oz (76.9 kg)   06/12/19 225 lb (102.1 kg)        Past Medical History:   Diagnosis Date    CAD (coronary artery disease)     Depression     Hypertension     Hypothyroidism     Major depressive disorder with single episode, in partial remission ([facility] [address] 10/14/2018    Neuropathy     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)     Type II or unspecified type diabetes mellitus without mention of complication, not stated as uncontrolled     Unspecified sleep apnea       Past Surgical History:   Procedure Laterality Date    APPENDECTOMY      CARDIAC CATHETERIZATION  2010   Berkowitz CARDIAC SURGERY  04/02/2011    repaired aorta    CARPAL TUNNEL RELEASE      COLONOSCOPY  2010    multiple colon polyps - repeat every 3 years   Pancho Cohen      cataracts - Dr Demi Tom, TOTAL ABDOMINAL      hemorrhaging, complete    THORACIC AORTIC ANEURYSM REPAIR      2011    THYROID SURGERY  1960    gland removed       Family History   Problem Relation Age of Onset    Heart Disease Mother     Heart Disease Father     Breast Cancer Sister         10 years ago     No Known Problems Sister     No Known Problems Sister     No Known Problems Sister        Social History     Tobacco Use    Smoking status: Never Smoker    Smokeless tobacco: Never Used   Substance Use Topics    Alcohol use: No      Current Outpatient Medications   Medication Sig Dispense Refill    fluocinolone (SYNALAR) 0.025 % ointment Apply topically 2 times daily. 60 g 1    Insulin Pen Needle 30G X 5 MM MISC 1 each by Does not apply route 4 times daily 400 each 5    aspirin 81 MG chewable tablet Take 81 mg by mouth daily.  TRUE METRIX BLOOD GLUCOSE TEST strip TEST four times a day  0    insulin regular human (HUMULIN R) 500 UNIT/ML concentrated injection vial Inject 80 Units into the skin 3 times daily (before meals) 3 vial 5    gabapentin (NEURONTIN) 300 MG capsule TAKE 1 CAPSULE DAILY.  90 capsule 0    triamcinolone (NASACORT ALLERGY 24HR) 55 MCG/ACT nasal inhaler 2 sprays by Nasal route daily 1 Inhaler 3    DULoxetine (CYMBALTA) 60 MG extended release capsule Take 1 capsule by mouth daily 90 capsule 3    nebivolol (BYSTOLIC) 5 MG tablet Take 0.5 tablets by mouth daily 30 tablet 0    Cyanocobalamin (B-12) 1000 MCG TBCR Take 1,000 mcg by mouth daily      Multiple Vitamins-Minerals (MULTIVITAMIN ADULT PO) Take 1 tablet by mouth daily      esomeprazole (NEXIUM) 40 MG delayed release capsule Take 1 capsule by mouth daily 90 capsule 3    ferrous sulfate 325 (65 Fe) MG tablet Take 1 tablet by mouth daily      pravastatin (PRAVACHOL) 40 MG tablet Take 1 tablet by mouth daily      oxybutynin (DITROPAN XL) 15 MG extended release tablet Take 1 tablet by mouth daily  0    b complex vitamins capsule Take 1 capsule by mouth daily      levothyroxine (SYNTHROID) 75 MCG tablet Take 75 mcg by mouth Daily.  Omega-3 Fatty Acids (OMEGA 3 PO) Take  by mouth.  VITAMIN D-3 (COLECALCIFEROL) 400 UNITS TABS Take 2,000 Units by mouth daily.  insulin glargine (TOUJEO MAX SOLOSTAR) 300 UNIT/ML injection pen Inject 80 Units into the skin nightly 3 pen 3     No current facility-administered medications for this visit. Allergies   Allergen Reactions    Hydromorphone     Invokana [Canagliflozin]      yeast    Glucophage [Metformin Hcl] Nausea And Vomiting    Ketorolac Nausea And Vomiting    Metformin      Other reaction(s): Intolerance-unknown  Other reaction(s): Nausea And Vomiting       Health Maintenance   Topic Date Due    Annual Wellness Visit (AWV)  10/14/2019    TSH testing  05/21/2020    DTaP/Tdap/Td vaccine (2 - Td) 04/17/2023    DEXA (modify frequency per FRAX score)  Completed    Flu vaccine  Completed    Shingles Vaccine  Completed    Pneumococcal 65+ years Vaccine  Completed       Subjective:      Review of Systems    Objective:     /78 (Site: Left Upper Arm, Position: Sitting, Cuff Size: Medium Adult)   Pulse 72   Temp 98 °F (36.7 °C) (Tympanic)   Wt 178 lb (80.7 kg)   LMP 04/24/1987 (Approximate)   BMI 30.55 kg/m²     Physical Exam   Constitutional: She appears well-developed and well-nourished. Cardiovascular: Normal rate. Pulmonary/Chest: Effort normal and breath sounds normal.   Abdominal: Soft.  Bowel sounds are normal. She exhibits no distension and no mass. There is no tenderness. There is no rebound. Genitourinary:         Genitourinary Comments: Diffuse erythema lichenification thickening of the skin no vaginal discharge some cracking also noted   Nursing note and vitals reviewed. Assessment/Plan:      Diagnosis Orders   1. Inverse psoriasis  fluconazole (DIFLUCAN) 150 MG tablet-we will treat for any secondary yeast with the fluconazole today start on the Synalar ointment topically twice daily continue to keep dry clean    fluocinolone (SYNALAR) 0.025 % ointment   2. Urinary tract infection with hematuria, site unspecified  POC URINE with Microscopic will recheck urinalysis today in light of recent urinary tract infection with hospitalization if negative would not add further antibiotics   3. Uncontrolled type 2 diabetes mellitus with diabetic polyneuropathy, with long-term current use of insulin (HCC)  DISCONTINUED: insulin glargine (TOUJEO MAX SOLOSTAR) 300 UNIT/ML injection pen will increase the Toujeo today and again urged to have better dietary compliance deferred to dietitian as well     Increase the Toujeo to 80 units and continue with the additional insulin sliding scale. Lab Results   Component Value Date    WBC 7.42 05/22/2019    HGB 12.1 05/22/2019    HCT 38.4 05/22/2019     05/22/2019    CHOL 160 03/08/2018    TRIG 143 03/08/2018    HDL 52 02/27/2017    ALT 18 05/22/2019    AST 22 05/22/2019     05/22/2019    K 3.4 (L) 05/22/2019     05/22/2019    CREATININE 0.4 (L) 05/22/2019    BUN 11 05/22/2019    CO2 38 (H) 05/22/2019    TSH 0.13 (L) 03/08/2018    LABA1C 10.9 05/10/2019    LABA1C 11.1 09/10/2018    LABA1C 10.2 03/07/2018    LABMICR 2.2 (H) 03/08/2018       Return for As scheduled. Patient given educational materials - see patientinstructions. Discussed use, benefit, and side effects of prescribed medications. All patient questions answered. Pt voiced understanding.  Reviewed health maintenance. Instructed to continue current medications, diet andexercise. Patient agreed with treatment plan. Follow up as directed.      Electronically signed by Elia Duane, MD on 6/23/2019

## 2019-06-17 NOTE — TELEPHONE ENCOUNTER
Bnei Smith is calling to request a refill on the following medication(s):  Requested Prescriptions     Pending Prescriptions Disp Refills    insulin glargine (TOUJEO MAX SOLOSTAR) 300 UNIT/ML injection pen 3 pen 3     Sig: Inject 80 Units into the skin nightly       Last Visit Date (If Applicable):  5/82/5343    Next Visit Date:    9/11/2019

## 2019-06-24 ENCOUNTER — TELEPHONE (OUTPATIENT)
Dept: OTOLARYNGOLOGY | Age: 84
End: 2019-06-24

## 2019-06-24 RX ORDER — CIPROFLOXACIN AND DEXAMETHASONE 3; 1 MG/ML; MG/ML
4 SUSPENSION/ DROPS AURICULAR (OTIC) 2 TIMES DAILY
Qty: 7.5 ML | Refills: 1 | Status: SHIPPED | OUTPATIENT
Start: 2019-06-24 | End: 2019-07-01

## 2019-06-24 NOTE — TELEPHONE ENCOUNTER
Patient's daughter in law called stating that patient is out of her ear drops and is requesting a refill. Daughter in law states that we can call the  back to discuss the issue since she is not on the communication form at [phone]. Kellie's number is [phone] if you need to talk to her.

## 2019-06-24 NOTE — TELEPHONE ENCOUNTER
Lorrie Samaniego can you please send a ciprodex rx in for the patient as Dr. Manuel Crouch notes in his dictation to start ciprodex but did not send the RX.

## 2019-07-03 ENCOUNTER — OFFICE VISIT (OUTPATIENT)
Dept: UROLOGY | Age: 84
End: 2019-07-03
Payer: MEDICARE

## 2019-07-03 VITALS
DIASTOLIC BLOOD PRESSURE: 80 MMHG | SYSTOLIC BLOOD PRESSURE: 142 MMHG | HEIGHT: 64 IN | HEART RATE: 72 BPM | WEIGHT: 177.91 LBS | BODY MASS INDEX: 30.37 KG/M2

## 2019-07-03 DIAGNOSIS — N39.0 RECURRENT UTI: Primary | ICD-10-CM

## 2019-07-03 DIAGNOSIS — R31.0 GROSS HEMATURIA: ICD-10-CM

## 2019-07-03 LAB
APPEARANCE: ABNORMAL
BACTERIA: ABNORMAL 1HPF
BILIRUBIN: ABNORMAL
BLOOD: ABNORMAL
CASTS: ABNORMAL /LPF
COLOR: YELLOW
CRYSTALS: ABNORMAL /HPF
EPITHELIAL CELLS, UA: ABNORMAL /HPF
GLUCOSE: ABNORMAL MG/DL
KETONES: ABNORMAL MG/DL
LEUKOCYTES, UA: ABNORMAL
MICROSCOPIC URINE: ABNORMAL
MUCUS: ABNORMAL /HPF
NITRITE, URINE: ABNORMAL
PH: 7 PH (ref 5–8.5)
PROTEIN,SCREEN: ABNORMAL MG/DL
RBC: ABNORMAL /HPF (ref 0–3)
SPECIFIC GRAVITY, URINE: 1.01 MG/DL (ref 1–1)
URINE CULTURE, ROUTINE: NORMAL
UROBILINOGEN, URINE: 0.2 MG/DL (ref 0.2–1)
WBC URINE: ABNORMAL (ref 0–4)
YEAST: ABNORMAL /HPF

## 2019-07-03 PROCEDURE — 99204 OFFICE O/P NEW MOD 45 MIN: CPT | Performed by: UROLOGY

## 2019-07-05 ENCOUNTER — TELEPHONE (OUTPATIENT)
Dept: UROLOGY | Age: 84
End: 2019-07-05

## 2019-07-05 DIAGNOSIS — N39.0 RECURRENT UTI: Primary | ICD-10-CM

## 2019-07-05 RX ORDER — CIPROFLOXACIN 500 MG/1
500 TABLET, FILM COATED ORAL 2 TIMES DAILY
Qty: 14 TABLET | Refills: 0 | Status: SHIPPED | OUTPATIENT
Start: 2019-07-05 | End: 2019-09-11 | Stop reason: ALTCHOICE

## 2019-07-10 ENCOUNTER — TELEPHONE (OUTPATIENT)
Dept: UROLOGY | Age: 84
End: 2019-07-10

## 2019-07-10 DIAGNOSIS — N39.0 RECURRENT UTI: ICD-10-CM

## 2019-07-11 DIAGNOSIS — N39.0 RECURRENT UTI: Primary | ICD-10-CM

## 2019-07-11 DIAGNOSIS — R31.0 GROSS HEMATURIA: ICD-10-CM

## 2019-07-11 LAB
AGE FOR GFR: 83
APPEARANCE: ABNORMAL
BACTERIA: ABNORMAL 1HPF
BILIRUBIN: NEGATIVE
BLOOD: NEGATIVE
CASTS: ABNORMAL /LPF
COLOR: ABNORMAL
CREAT SERPL-MCNC: 0.4 MG/DL (ref 0.5–1)
CRYSTALS: ABNORMAL /HPF
EGFR BF: 184 ML/MIN/1.73 M2
EGFR BM: 249 ML/MIN/1.73 M2
EGFR WF: 152 ML/MIN/1.73 M2
EGFR WM: 205 ML/MIN/1.73 M2
EPITHELIAL CELLS, UA: ABNORMAL /HPF
GLUCOSE: ABNORMAL MG/DL
KETONES: NEGATIVE MG/DL
LEUKOCYTES, UA: NEGATIVE
MICROSCOPIC URINE: ABNORMAL
MUCUS: ABNORMAL /HPF
NITRITE, URINE: NEGATIVE
PH: 6 PH (ref 5–8.5)
PROTEIN,SCREEN: NEGATIVE MG/DL
RBC: ABNORMAL /HPF (ref 0–3)
SPECIFIC GRAVITY, URINE: 1.01 MG/DL (ref 1–1)
URINE CULTURE, ROUTINE: NORMAL
UROBILINOGEN, URINE: 0.2 MG/DL (ref 0.2–1)
WBC URINE: ABNORMAL (ref 0–4)
YEAST: ABNORMAL /HPF

## 2019-07-18 ENCOUNTER — OFFICE VISIT (OUTPATIENT)
Dept: OTOLARYNGOLOGY | Age: 84
End: 2019-07-18
Payer: MEDICARE

## 2019-07-18 VITALS
HEART RATE: 70 BPM | HEIGHT: 64 IN | WEIGHT: 177.91 LBS | DIASTOLIC BLOOD PRESSURE: 76 MMHG | BODY MASS INDEX: 30.37 KG/M2 | OXYGEN SATURATION: 98 % | TEMPERATURE: 98.1 F | SYSTOLIC BLOOD PRESSURE: 140 MMHG

## 2019-07-18 DIAGNOSIS — H90.3 SENSORINEURAL HEARING LOSS (SNHL) OF BOTH EARS: ICD-10-CM

## 2019-07-18 DIAGNOSIS — H60.393 OTHER INFECTIVE CHRONIC OTITIS EXTERNA OF BOTH EARS: Primary | ICD-10-CM

## 2019-07-18 DIAGNOSIS — R42 DIZZINESS: ICD-10-CM

## 2019-07-18 PROCEDURE — 99213 OFFICE O/P EST LOW 20 MIN: CPT | Performed by: OTOLARYNGOLOGY

## 2019-07-18 NOTE — PROGRESS NOTES
aspirated, has NEGRITO     TMs:  Normal       Hearin [place]:  Zackery Andrea. [employer]     Nose:    External: Normal    Septum:  Normal    Turbinates: Normal             Nasal Cavity: Normal         Naso Pharyngoscopy:     Nasal Endoscopy:      Oral Cavity & Oral Pharynx:    Tongue:  Normal    Teeth & Gums:             Palate:  Stacy     Tonsils:      FOM:  Normal     Other:      Neck:    Thyroid:Normal       Lymph nodes: Normal           Trachea:  Normal      Masses:  Normal    Other:        Eyes:    EOMs:      Nystagmus:      Neurological:    CN V:      CN VII:       Gait & Station:      Romberg:      Tandem Gait:      [place]:       Oriented x 3: Normal     Affect:  Normal    Data reviewed:  audio    ASSESSMENT:   Diagnosis Orders   1. Other infective chronic otitis externa of both ears     2. Dizziness     3. Sensorineural hearing loss (SNHL) of both ears         PLAN: suggest BTE HAs, see 3 mos  Return in about 3 months (around 10/18/2019) for follow -up. No orders of the defined types were placed in this encounter.         Matt Tillman MD

## 2019-07-24 ENCOUNTER — TELEPHONE (OUTPATIENT)
Dept: FAMILY MEDICINE CLINIC | Age: 84
End: 2019-07-24

## 2019-07-24 DIAGNOSIS — N39.0 URINARY TRACT INFECTION WITHOUT HEMATURIA, SITE UNSPECIFIED: Primary | ICD-10-CM

## 2019-07-24 LAB
APPEARANCE: ABNORMAL
BACTERIA: ABNORMAL 1HPF
BILIRUBIN: ABNORMAL
BLOOD: ABNORMAL
CASTS: ABNORMAL /LPF
COLOR: YELLOW
EPITHELIAL CELLS, UA: ABNORMAL /HPF
GLUCOSE: ABNORMAL MG/DL
KETONES: ABNORMAL MG/DL
LEUKOCYTES, UA: ABNORMAL
MICROSCOPIC URINE: ABNORMAL
MUCUS: ABNORMAL /HPF
NITRITE, URINE: ABNORMAL
OTHER: ABNORMAL
PH: 8 PH (ref 5–8.5)
PROTEIN,SCREEN: ABNORMAL MG/DL
RBC: ABNORMAL /HPF (ref 0–3)
SPECIFIC GRAVITY, URINE: 1.02 MG/DL (ref 1–1)
URINE CULTURE, ROUTINE: NORMAL
UROBILINOGEN, URINE: 0.2 MG/DL (ref 0.2–1)
WBC URINE: ABNORMAL (ref 0–4)
YEAST: ABNORMAL /HPF

## 2019-07-24 RX ORDER — CIPROFLOXACIN 500 MG/1
500 TABLET, FILM COATED ORAL 2 TIMES DAILY
Qty: 20 TABLET | Refills: 0 | Status: SHIPPED | OUTPATIENT
Start: 2019-07-24 | End: 2019-08-03

## 2019-07-24 NOTE — TELEPHONE ENCOUNTER
Please notify script sent to [facility] today for the UTI

## 2019-07-26 ENCOUNTER — TELEPHONE (OUTPATIENT)
Dept: FAMILY MEDICINE CLINIC | Age: 84
End: 2019-07-26

## 2019-08-05 ENCOUNTER — TELEPHONE (OUTPATIENT)
Dept: FAMILY MEDICINE CLINIC | Age: 84
End: 2019-08-05

## 2019-08-05 NOTE — TELEPHONE ENCOUNTER
[facility] sangeetha night time breathing machine quit working after all of these years. He called [facility] and was told he needed a new script. Said she hasnt seen pulmonolgy since she got the machine he doesn't believe. Unsure of what he needs to do from here to get her a script.

## 2019-08-06 LAB
AVERAGE GLUCOSE: NORMAL
HBA1C MFR BLD: 13.9 %

## 2019-08-06 NOTE — TELEPHONE ENCOUNTER
Needs a sleep study from over 5 years, they will look it over and see if she needs a sleep study or not

## 2019-08-23 RX ORDER — OXYBUTYNIN CHLORIDE 15 MG/1
15 TABLET, EXTENDED RELEASE ORAL DAILY
Qty: 30 TABLET | Refills: 5 | Status: SHIPPED | OUTPATIENT
Start: 2019-08-23 | End: 2020-02-24 | Stop reason: SDUPTHER

## 2019-09-06 ENCOUNTER — TELEPHONE (OUTPATIENT)
Dept: UROLOGY | Age: 84
End: 2019-09-06

## 2019-09-11 ENCOUNTER — OFFICE VISIT (OUTPATIENT)
Dept: FAMILY MEDICINE CLINIC | Age: 84
End: 2019-09-11
Payer: MEDICARE

## 2019-09-11 VITALS
DIASTOLIC BLOOD PRESSURE: 84 MMHG | OXYGEN SATURATION: 97 % | SYSTOLIC BLOOD PRESSURE: 138 MMHG | HEART RATE: 73 BPM | WEIGHT: 178.4 LBS | BODY MASS INDEX: 30.61 KG/M2

## 2019-09-11 DIAGNOSIS — E08.59 DIABETES MELLITUS DUE TO UNDERLYING CONDITION WITH OTHER CIRCULATORY COMPLICATIONS (HCC): ICD-10-CM

## 2019-09-11 DIAGNOSIS — L40.8 INVERSE PSORIASIS: ICD-10-CM

## 2019-09-11 DIAGNOSIS — F32.4 MAJOR DEPRESSIVE DISORDER WITH SINGLE EPISODE, IN PARTIAL REMISSION (HCC): ICD-10-CM

## 2019-09-11 DIAGNOSIS — H90.A32 MIXED CONDUCTIVE AND SENSORINEURAL HEARING LOSS OF LEFT EAR WITH RESTRICTED HEARING OF RIGHT EAR: ICD-10-CM

## 2019-09-11 PROBLEM — N32.81 OVERACTIVE BLADDER: Status: ACTIVE | Noted: 2019-01-17

## 2019-09-11 PROCEDURE — 99214 OFFICE O/P EST MOD 30 MIN: CPT | Performed by: FAMILY MEDICINE

## 2019-09-11 RX ORDER — DULOXETIN HYDROCHLORIDE 30 MG/1
CAPSULE, DELAYED RELEASE ORAL
COMMUNITY
Start: 2019-08-08 | End: 2020-01-15 | Stop reason: ALTCHOICE

## 2019-09-11 ASSESSMENT — ENCOUNTER SYMPTOMS
ABDOMINAL PAIN: 0
COUGH: 1
CONSTIPATION: 0
DIARRHEA: 0
WHEEZING: 0
SHORTNESS OF BREATH: 0

## 2019-09-11 NOTE — PROGRESS NOTES
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)     Type II or unspecified type diabetes mellitus without mention of complication, not stated as uncontrolled     Unspecified sleep apnea       Past Surgical History:   Procedure Laterality Date    APPENDECTOMY      CARDIAC CATHETERIZATION  2010   Anjana Reinoso CARDIAC SURGERY  04/02/2011    repaired aorta    CARPAL TUNNEL RELEASE      COLONOSCOPY  2010    multiple colon polyps - repeat every 3 years   Dayna Moss      cataracts - Dr Izaiah Jaramillo, TOTAL ABDOMINAL      hemorrhaging, complete    THORACIC AORTIC ANEURYSM REPAIR      2011    THYROID SURGERY  1960    gland removed       Family History   Problem Relation Age of Onset    Heart Disease Mother     Heart Disease Father     Breast Cancer Sister         10 years ago     No Known Problems Sister     No Known Problems Sister     No Known Problems Sister        Social History     Tobacco Use    Smoking status: Never Smoker    Smokeless tobacco: Never Used   Substance Use Topics    Alcohol use: No      Current Outpatient Medications   Medication Sig Dispense Refill    DULoxetine (CYMBALTA) 30 MG extended release capsule       insulin regular human (HUMULIN R) 500 UNIT/ML concentrated injection vial Inject 60 Units into the skin 3 times daily (before meals) Indications: 60 units in a.m. , 60 untis at lunch time , and 55 units  at h.s. 3 vial 5    oxybutynin (DITROPAN XL) 15 MG extended release tablet Take 1 tablet by mouth daily 30 tablet 5    insulin glargine (TOUJEO MAX SOLOSTAR) 300 UNIT/ML injection pen Inject 80 Units into the skin nightly 3 pen 3    fluocinolone (SYNALAR) 0.025 % ointment Apply topically 2 times daily. 60 g 1    Insulin Pen Needle 30G X 5 MM MISC 1 each by Does not apply route 4 times daily 400 each 5    aspirin 81 MG chewable tablet Take 81 mg by mouth daily.       TRUE METRIX Mixed conductive and sensorineural hearing loss of left ear with restricted hearing of right ear   agree with the referral to [facility]. Encouraged also to continue with the new hearing aids   4. Major depressive disorder with single episode, in partial remission (HCC)   no change in the Cymbalta at this time. Encouraged to get out with friends and sister as much as possible     11. Inverse psoriasis-- contiune with the steroids topically as needed    Lab Results   Component Value Date    WBC 7.42 05/22/2019    HGB 12.1 05/22/2019    HCT 38.4 05/22/2019     05/22/2019    CHOL 160 03/08/2018    TRIG 143 03/08/2018    HDL 52 02/27/2017    ALT 18 05/22/2019    AST 22 05/22/2019     05/22/2019    K 3.4 (L) 05/22/2019     05/22/2019    CREATININE 0.4 (L) 07/11/2019    BUN 11 05/22/2019    CO2 38 (H) 05/22/2019    TSH 0.13 (L) 03/08/2018    LABA1C 10.9 05/10/2019    LABA1C 11.1 09/10/2018    LABA1C 10.2 03/07/2018    LABMICR 2.2 (H) 03/08/2018       Return in about 4 months (around 1/11/2020) for HTN, DM follow up. Patient given educational materials - see patientinstructions. Discussed use, benefit, and side effects of prescribed medications. All patient questions answered. Pt voiced understanding. Reviewed health maintenance. Instructed to continue current medications, diet andexercise. Patient agreed with treatment plan. Follow up as directed.      Electronically signed by Caitlyn Aquino MD on 9/15/2019

## 2019-10-08 ENCOUNTER — OFFICE VISIT (OUTPATIENT)
Dept: OTOLARYNGOLOGY | Age: 84
End: 2019-10-08
Payer: MEDICARE

## 2019-10-08 VITALS
HEART RATE: 84 BPM | DIASTOLIC BLOOD PRESSURE: 80 MMHG | SYSTOLIC BLOOD PRESSURE: 124 MMHG | TEMPERATURE: 97.6 F | BODY MASS INDEX: 30.45 KG/M2 | WEIGHT: 178.35 LBS | HEIGHT: 64 IN

## 2019-10-08 DIAGNOSIS — H60.393 OTHER INFECTIVE CHRONIC OTITIS EXTERNA OF BOTH EARS: Primary | ICD-10-CM

## 2019-10-08 PROCEDURE — 99213 OFFICE O/P EST LOW 20 MIN: CPT | Performed by: OTOLARYNGOLOGY

## 2019-10-21 ENCOUNTER — OFFICE VISIT (OUTPATIENT)
Dept: FAMILY MEDICINE CLINIC | Age: 84
End: 2019-10-21
Payer: MEDICARE

## 2019-10-21 VITALS
WEIGHT: 192.4 LBS | HEART RATE: 73 BPM | SYSTOLIC BLOOD PRESSURE: 136 MMHG | OXYGEN SATURATION: 97 % | BODY MASS INDEX: 33.01 KG/M2 | DIASTOLIC BLOOD PRESSURE: 84 MMHG

## 2019-10-21 DIAGNOSIS — E66.09 CLASS 1 OBESITY DUE TO EXCESS CALORIES WITH SERIOUS COMORBIDITY AND BODY MASS INDEX (BMI) OF 33.0 TO 33.9 IN ADULT: ICD-10-CM

## 2019-10-21 DIAGNOSIS — G47.33 OBSTRUCTIVE SLEEP APNEA SYNDROME: Primary | ICD-10-CM

## 2019-10-21 PROBLEM — H26.40 SECONDARY CATARACT OF BOTH EYES: Status: ACTIVE | Noted: 2017-08-07

## 2019-10-21 PROCEDURE — 99213 OFFICE O/P EST LOW 20 MIN: CPT | Performed by: FAMILY MEDICINE

## 2019-11-14 ENCOUNTER — TELEPHONE (OUTPATIENT)
Dept: FAMILY MEDICINE CLINIC | Age: 84
End: 2019-11-14

## 2019-12-04 RX ORDER — INSULIN GLARGINE 300 U/ML
INJECTION, SOLUTION SUBCUTANEOUS
Qty: 9 PEN | Refills: 3 | Status: SHIPPED | OUTPATIENT
Start: 2019-12-04 | End: 2020-02-24 | Stop reason: SDUPTHER

## 2019-12-09 ENCOUNTER — OFFICE VISIT (OUTPATIENT)
Dept: OTOLARYNGOLOGY | Age: 84
End: 2019-12-09
Payer: MEDICARE

## 2019-12-09 VITALS
SYSTOLIC BLOOD PRESSURE: 132 MMHG | DIASTOLIC BLOOD PRESSURE: 82 MMHG | HEIGHT: 64 IN | WEIGHT: 192.46 LBS | TEMPERATURE: 98.6 F | BODY MASS INDEX: 32.86 KG/M2 | OXYGEN SATURATION: 97 % | HEART RATE: 72 BPM

## 2019-12-09 DIAGNOSIS — H60.393 OTHER INFECTIVE CHRONIC OTITIS EXTERNA OF BOTH EARS: ICD-10-CM

## 2019-12-09 DIAGNOSIS — H90.3 SENSORINEURAL HEARING LOSS (SNHL) OF BOTH EARS: Primary | ICD-10-CM

## 2019-12-09 PROCEDURE — 99213 OFFICE O/P EST LOW 20 MIN: CPT | Performed by: OTOLARYNGOLOGY

## 2019-12-09 RX ORDER — BENZONATATE 200 MG/1
200 CAPSULE ORAL 3 TIMES DAILY PRN
Qty: 30 CAPSULE | Refills: 2 | Status: SHIPPED | OUTPATIENT
Start: 2019-12-09 | End: 2020-11-16 | Stop reason: ALTCHOICE

## 2020-01-15 ENCOUNTER — OFFICE VISIT (OUTPATIENT)
Dept: FAMILY MEDICINE CLINIC | Age: 85
End: 2020-01-15
Payer: MEDICARE

## 2020-01-15 VITALS
BODY MASS INDEX: 33.44 KG/M2 | SYSTOLIC BLOOD PRESSURE: 132 MMHG | WEIGHT: 194.9 LBS | DIASTOLIC BLOOD PRESSURE: 82 MMHG | HEART RATE: 71 BPM | OXYGEN SATURATION: 97 %

## 2020-01-15 LAB — HBA1C MFR BLD: 8.1 %

## 2020-01-15 PROCEDURE — G8400 PT W/DXA NO RESULTS DOC: HCPCS | Performed by: FAMILY MEDICINE

## 2020-01-15 PROCEDURE — G8484 FLU IMMUNIZE NO ADMIN: HCPCS | Performed by: FAMILY MEDICINE

## 2020-01-15 PROCEDURE — 83036 HEMOGLOBIN GLYCOSYLATED A1C: CPT | Performed by: FAMILY MEDICINE

## 2020-01-15 PROCEDURE — 1123F ACP DISCUSS/DSCN MKR DOCD: CPT | Performed by: FAMILY MEDICINE

## 2020-01-15 PROCEDURE — G8427 DOCREV CUR MEDS BY ELIG CLIN: HCPCS | Performed by: FAMILY MEDICINE

## 2020-01-15 PROCEDURE — 1090F PRES/ABSN URINE INCON ASSESS: CPT | Performed by: FAMILY MEDICINE

## 2020-01-15 PROCEDURE — G8417 CALC BMI ABV UP PARAM F/U: HCPCS | Performed by: FAMILY MEDICINE

## 2020-01-15 PROCEDURE — 99211 OFF/OP EST MAY X REQ PHY/QHP: CPT

## 2020-01-15 PROCEDURE — 4040F PNEUMOC VAC/ADMIN/RCVD: CPT | Performed by: FAMILY MEDICINE

## 2020-01-15 PROCEDURE — 1036F TOBACCO NON-USER: CPT | Performed by: FAMILY MEDICINE

## 2020-01-15 PROCEDURE — 99214 OFFICE O/P EST MOD 30 MIN: CPT | Performed by: FAMILY MEDICINE

## 2020-01-15 RX ORDER — DULOXETIN HYDROCHLORIDE 30 MG/1
CAPSULE, DELAYED RELEASE ORAL
Qty: 30 CAPSULE | Status: CANCELLED | OUTPATIENT
Start: 2020-01-15

## 2020-01-15 RX ORDER — DULOXETIN HYDROCHLORIDE 60 MG/1
60 CAPSULE, DELAYED RELEASE ORAL DAILY
Qty: 90 CAPSULE | Refills: 3 | Status: SHIPPED | OUTPATIENT
Start: 2020-01-15 | End: 2020-02-24 | Stop reason: SDUPTHER

## 2020-01-15 ASSESSMENT — ENCOUNTER SYMPTOMS
WHEEZING: 0
SHORTNESS OF BREATH: 0
COUGH: 1

## 2020-01-15 NOTE — PROGRESS NOTES
[address] [address] [address]  Dept: [phone]  Dept Critical access hospital:[phone]    Je Cisse is a 80 y.o. female who presents today for her medical conditions/complaints as notedbelow. Je Cisse is c/o of Diabetes (4 month follow up )      HPI:     HPI  Blood sugar control is much improved-- in the am less than 100 and has always been below 200 in the evening. No sx of hypoglycemia and no neuropathy. Did just see her endocrinologist about 3 weeks ago. Has not had any symptoms from her diabetes other than this. She has not had any high blood pressure symptoms no hypertension related chest pain shortness of breath palpitations no lower extremity edema. He has been taking her medications regularly and has no side effects attributable to her medications. Was put up to the 60 mg of the duloxetine, then the 30 mg was refilled and has noticed she worries more and her mind races more on the 30 mg than on the 60 mg -- would like to go back up! Feels like her anxiety was better controlled and her depressive symptoms were also better controlled on the higher dose. Has had some blurry vision -- double for about 2 minutes- gets better when she closes her eyes-- did have an eye check up. Did not have these sx at her eye check up-- better if she closes her eye-- one or the other. Only happens 1-2 times per week. Sometimes will be reading. Will look up and close her eyes and this will go away. No holes or black spots, no curtain or visual loss. Has been working on getting a follow-up with the specialist for her ears a second opinion. Her insurance would not let her go to [place] [facility] she also was referred to the [facility] but they would not cover the window mommy so now she is seeing a different one. Has this appointment set up. Her hearing is been poor but stable.   BP Readings from Last 3 Encounters:   01/15/20 completely normal and unable to elicit her diplopia symptoms today   Neck:      Musculoskeletal: Normal range of motion and neck supple. Thyroid: No thyromegaly. Vascular: No JVD. Cardiovascular:      Rate and Rhythm: Normal rate and regular rhythm. Pulses: Normal pulses. Heart sounds: Normal heart sounds. No murmur. No friction rub. No gallop. Pulmonary:      Effort: Pulmonary effort is normal. No respiratory distress. Breath sounds: Normal breath sounds. Abdominal:      General: Abdomen is flat. Bowel sounds are normal.      Palpations: Abdomen is soft. Musculoskeletal:         General: No swelling or tenderness. Right lower leg: No edema. Left lower leg: No edema. Lymphadenopathy:      Cervical: No cervical adenopathy. Skin:     General: Skin is warm. Capillary Refill: Capillary refill takes less than 2 seconds. Neurological:      Mental Status: She is alert and oriented to person, place, and time. Cranial Nerves: No cranial nerve deficit. Coordination: Coordination normal.   Psychiatric:         Mood and Affect: Mood normal.         Behavior: Behavior normal.         Thought Content: Thought content normal.         Judgment: Judgment normal.         Assessment/Plan:      Diagnosis Orders   1. Uncontrolled type 2 diabetes mellitus with diabetic polyneuropathy, with long-term current use of insulin ([facility])  POCT glycosylated hemoglobin (Hb A1C)-proved. Continue on her current insulin regimen. Watch for low blood sugars. 2. Current moderate episode of major depressive disorder without prior episode ([facility])  DULoxetine (CYMBALTA) 60 MG extended release capsule back to the 60 mg daily new prescription sent in today   3. Major depressive disorder with single episode, in partial remission ([facility] [address]     4. Acquired hypothyroidism   we will check labs yearly as has been in the controlled range at this time.    5. Mixed conductive and sensorineural hearing loss of left ear with restricted hearing of right ear   continue to follow-up as recommended keep the scheduled appointment   6. Atherosclerosis of native coronary artery of native heart without angina pectoris   asymptomatic at this time no changes   7. 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 If she has this and it persists to come in immediately. Has had her normal exams and recommend that she continue to keep these on a regular basis. Lab Results   Component Value Date    WBC 7.42 05/22/2019    HGB 12.1 05/22/2019    HCT 38.4 05/22/2019     05/22/2019    CHOL 160 03/08/2018    TRIG 143 03/08/2018    HDL 52 02/27/2017    ALT 18 05/22/2019    AST 22 05/22/2019     05/22/2019    K 3.4 (L) 05/22/2019     05/22/2019    CREATININE 0.4 (L) 07/11/2019    BUN 11 05/22/2019    CO2 38 (H) 05/22/2019    TSH 0.13 (L) 03/08/2018    LABA1C 8.1 01/15/2020    LABA1C 13.9 08/06/2019    LABA1C 10.9 05/10/2019    LABMICR 2.2 (H) 03/08/2018       Return in about 6 months (around 7/15/2020) for DM follow up. Patient given educational materials - see patientinstructions. Discussed use, benefit, and side effects of prescribed medications. All patient questions answered. Pt voiced understanding. Reviewed health maintenance. Instructed to continue current medications, diet andexercise. Patient agreed with treatment plan. Follow up as directed.      Electronically signed by Nikia Bennett MD on 1/19/2020

## 2020-01-15 NOTE — PROGRESS NOTES
Review of Systems   Respiratory: Positive for cough ( off and on). Negative for shortness of breath and wheezing. Cardiovascular: Positive for leg swelling ( i think my legs do swell sometimes, but i dont think its my heart i blame it on the neuropathy). Negative for chest pain and palpitations. Endocrine: Negative for polydipsia. Genitourinary: Negative for frequency and urgency. States I have always had that, but nothing that is out of my normal.    Neurological: Positive for dizziness. Negative for headaches. Discuss cymbalta dosage.

## 2020-01-19 ASSESSMENT — ENCOUNTER SYMPTOMS
SHORTNESS OF BREATH: 0
WHEEZING: 0
EYE ITCHING: 0
COUGH: 0
EYE PAIN: 0
PHOTOPHOBIA: 0
EYE DISCHARGE: 0
EYE REDNESS: 0

## 2020-02-24 RX ORDER — OXYBUTYNIN CHLORIDE 15 MG/1
15 TABLET, EXTENDED RELEASE ORAL DAILY
Qty: 90 TABLET | Refills: 3 | Status: SHIPPED | OUTPATIENT
Start: 2020-02-24 | End: 2020-07-22 | Stop reason: SDUPTHER

## 2020-02-24 RX ORDER — DULOXETIN HYDROCHLORIDE 60 MG/1
60 CAPSULE, DELAYED RELEASE ORAL DAILY
Qty: 90 CAPSULE | Refills: 3 | Status: SHIPPED | OUTPATIENT
Start: 2020-02-24 | End: 2020-07-29 | Stop reason: SDUPTHER

## 2020-02-24 NOTE — TELEPHONE ENCOUNTER
Scotty Will is requesting a refill on the following medication(s):  Requested Prescriptions     Pending Prescriptions Disp Refills    DULoxetine (CYMBALTA) 60 MG extended release capsule 90 capsule 3     Sig: Take 1 capsule by mouth daily    Insulin Glargine, 2 Unit Dial, (TOUJEO MAX SOLOSTAR) 300 UNIT/ML SOPN 9 pen 3     Sig: INJECT 80 UNITS SUBCUTANEOUSLY NIGHTLY    oxybutynin (DITROPAN XL) 15 MG extended release tablet 90 tablet 3     Sig: Take 1 tablet by mouth daily       Last Visit Date (If Applicable):  1/58/3753    Next Visit Date:    5/15/2020

## 2020-05-15 ENCOUNTER — OFFICE VISIT (OUTPATIENT)
Dept: FAMILY MEDICINE CLINIC | Age: 85
End: 2020-05-15
Payer: MEDICARE

## 2020-05-15 VITALS
WEIGHT: 200 LBS | SYSTOLIC BLOOD PRESSURE: 132 MMHG | BODY MASS INDEX: 34.31 KG/M2 | DIASTOLIC BLOOD PRESSURE: 70 MMHG | HEART RATE: 84 BPM | OXYGEN SATURATION: 99 %

## 2020-05-15 PROBLEM — E66.01 SEVERE OBESITY (BMI 35.0-39.9) WITH COMORBIDITY (HCC): Status: ACTIVE | Noted: 2020-04-16

## 2020-05-15 LAB — HBA1C MFR BLD: 9 %

## 2020-05-15 PROCEDURE — G8417 CALC BMI ABV UP PARAM F/U: HCPCS | Performed by: FAMILY MEDICINE

## 2020-05-15 PROCEDURE — 1090F PRES/ABSN URINE INCON ASSESS: CPT | Performed by: FAMILY MEDICINE

## 2020-05-15 PROCEDURE — 99214 OFFICE O/P EST MOD 30 MIN: CPT | Performed by: FAMILY MEDICINE

## 2020-05-15 PROCEDURE — 1123F ACP DISCUSS/DSCN MKR DOCD: CPT | Performed by: FAMILY MEDICINE

## 2020-05-15 PROCEDURE — 1036F TOBACCO NON-USER: CPT | Performed by: FAMILY MEDICINE

## 2020-05-15 PROCEDURE — G8400 PT W/DXA NO RESULTS DOC: HCPCS | Performed by: FAMILY MEDICINE

## 2020-05-15 PROCEDURE — G8427 DOCREV CUR MEDS BY ELIG CLIN: HCPCS | Performed by: FAMILY MEDICINE

## 2020-05-15 PROCEDURE — 4040F PNEUMOC VAC/ADMIN/RCVD: CPT | Performed by: FAMILY MEDICINE

## 2020-05-15 PROCEDURE — 83036 HEMOGLOBIN GLYCOSYLATED A1C: CPT | Performed by: FAMILY MEDICINE

## 2020-05-15 PROCEDURE — 3052F HG A1C>EQUAL 8.0%<EQUAL 9.0%: CPT | Performed by: FAMILY MEDICINE

## 2020-05-15 NOTE — PROGRESS NOTES
1 tablet by mouth daily      esomeprazole (NEXIUM) 40 MG delayed release capsule Take 1 capsule by mouth daily 90 capsule 3    pravastatin (PRAVACHOL) 40 MG tablet Take 1 tablet by mouth daily      b complex vitamins capsule Take 1 capsule by mouth daily      levothyroxine (SYNTHROID) 75 MCG tablet Take 75 mcg by mouth Daily.  Omega-3 Fatty Acids (OMEGA 3 PO) Take  by mouth.  VITAMIN D-3 (COLECALCIFEROL) 400 UNITS TABS Take 2,000 Units by mouth daily.  Insulin Pen Needle 30G X 5 MM MISC 1 each by Does not apply route 4 times daily 400 each 5    TRUE METRIX BLOOD GLUCOSE TEST strip TEST four times a day  0     No current facility-administered medications for this visit. Allergies   Allergen Reactions    Exenatide Other (See Comments)    Glucophage  [Metformin Hcl] Other (See Comments)    Hydromorphone     Invokana [Canagliflozin]      yeast    Lipitor  [Atorvastatin Calcium] Other (See Comments)    Other      [place]wist    Glucophage [Metformin Hcl] Nausea And Vomiting    Ketorolac Nausea And Vomiting    Metformin      Other reaction(s): Intolerance-unknown  Other reaction(s): Nausea And Vomiting       Health Maintenance   Topic Date Due    Lipid screen  03/08/2019    Annual Wellness Visit (AWV)  05/29/2019    TSH testing  05/21/2020    DTaP/Tdap/Td vaccine (2 - Td) 04/17/2023    DEXA (modify frequency per FRAX score)  Completed    Flu vaccine  Completed    Shingles Vaccine  Completed    Pneumococcal 65+ years Vaccine  Completed    Hepatitis A vaccine  Aged Out    Hib vaccine  Aged Out    Meningococcal (ACWY) vaccine  Aged Out       Subjective:      Review of Systems    Objective:     /70 (Site: Right Upper Arm, Position: Sitting, Cuff Size: Large Adult)   Pulse 84   Wt 200 lb (90.7 kg)   LMP 04/24/1987 (Approximate)   SpO2 99%   BMI 34.31 kg/m²     Physical Exam  Vitals signs and nursing note reviewed. Constitutional:       Appearance: Normal appearance.  She

## 2020-05-22 ENCOUNTER — HOSPITAL ENCOUNTER (OUTPATIENT)
Age: 85
Setting detail: SPECIMEN
Discharge: HOME | End: 2020-05-22
Payer: MEDICARE

## 2020-05-22 LAB
ALBUMIN SERPL-MCNC: 4 G/DL (ref 3.5–5.2)
ALBUMIN/GLOBULIN RATIO: 1.7 (ref 1–2.5)
ALP BLD-CCNC: 95 U/L (ref 35–104)
ALT SERPL-CCNC: 12 U/L (ref 5–33)
ANION GAP SERPL CALCULATED.3IONS-SCNC: 9 MMOL/L (ref 9–17)
AST SERPL-CCNC: 12 U/L
BILIRUB SERPL-MCNC: 0.45 MG/DL (ref 0.3–1.2)
BUN BLDV-MCNC: 20 MG/DL (ref 8–23)
BUN/CREAT BLD: ABNORMAL (ref 9–20)
CALCIUM SERPL-MCNC: 9 MG/DL (ref 8.6–10.4)
CHLORIDE BLD-SCNC: 102 MMOL/L (ref 98–107)
CHOLESTEROL/HDL RATIO: 3.3
CHOLESTEROL: 169 MG/DL
CO2: 31 MMOL/L (ref 20–31)
CREAT SERPL-MCNC: <0.4 MG/DL (ref 0.5–0.9)
CREATININE URINE: 67.7 MG/DL (ref 28–217)
GFR AFRICAN AMERICAN: ABNORMAL ML/MIN
GFR NON-AFRICAN AMERICAN: ABNORMAL ML/MIN
GFR SERPL CREATININE-BSD FRML MDRD: ABNORMAL ML/MIN/{1.73_M2}
GFR SERPL CREATININE-BSD FRML MDRD: ABNORMAL ML/MIN/{1.73_M2}
GLUCOSE BLD-MCNC: 204 MG/DL (ref 70–99)
HDLC SERPL-MCNC: 52 MG/DL
LDL CHOLESTEROL: 83 MG/DL (ref 0–130)
MICROALBUMIN/CREAT 24H UR: 135 MG/L
MICROALBUMIN/CREAT UR-RTO: 199 MCG/MG CREAT
POTASSIUM SERPL-SCNC: 5 MMOL/L (ref 3.7–5.3)
SODIUM BLD-SCNC: 142 MMOL/L (ref 135–144)
THYROXINE, FREE: 0.86 NG/DL (ref 0.93–1.7)
TOTAL PROTEIN: 6.4 G/DL (ref 6.4–8.3)
TRIGL SERPL-MCNC: 169 MG/DL
TSH SERPL DL<=0.05 MIU/L-ACNC: 1.84 MIU/L (ref 0.3–5)
VITAMIN D 25-HYDROXY: 27.6 NG/ML (ref 30–100)
VLDLC SERPL CALC-MCNC: ABNORMAL MG/DL (ref 1–30)

## 2020-05-22 PROCEDURE — 80053 COMPREHEN METABOLIC PANEL: CPT

## 2020-05-22 PROCEDURE — 82570 ASSAY OF URINE CREATININE: CPT

## 2020-05-22 PROCEDURE — 82043 UR ALBUMIN QUANTITATIVE: CPT

## 2020-05-22 PROCEDURE — 80061 LIPID PANEL: CPT

## 2020-05-22 PROCEDURE — 84443 ASSAY THYROID STIM HORMONE: CPT

## 2020-05-22 PROCEDURE — 82306 VITAMIN D 25 HYDROXY: CPT

## 2020-05-22 PROCEDURE — 84439 ASSAY OF FREE THYROXINE: CPT

## 2020-05-22 PROCEDURE — 36415 COLL VENOUS BLD VENIPUNCTURE: CPT

## 2020-07-22 RX ORDER — OXYBUTYNIN CHLORIDE 15 MG/1
15 TABLET, EXTENDED RELEASE ORAL DAILY
Qty: 90 TABLET | Refills: 3 | Status: SHIPPED | OUTPATIENT
Start: 2020-07-22 | End: 2020-07-29 | Stop reason: SDUPTHER

## 2020-07-29 RX ORDER — NEBIVOLOL 5 MG/1
2.5 TABLET ORAL DAILY
Qty: 30 TABLET | Refills: 0 | Status: SHIPPED | OUTPATIENT
Start: 2020-07-29 | End: 2020-12-28 | Stop reason: SDUPTHER

## 2020-07-29 RX ORDER — LEVOTHYROXINE SODIUM 0.07 MG/1
75 TABLET ORAL DAILY
Qty: 30 TABLET | Refills: 5 | Status: SHIPPED | OUTPATIENT
Start: 2020-07-29 | End: 2021-05-11 | Stop reason: SDUPTHER

## 2020-07-29 RX ORDER — INSULIN GLARGINE 300 U/ML
INJECTION, SOLUTION SUBCUTANEOUS
Qty: 9 PEN | Refills: 3 | Status: SHIPPED | OUTPATIENT
Start: 2020-07-29 | End: 2021-05-17

## 2020-07-29 RX ORDER — DULOXETIN HYDROCHLORIDE 60 MG/1
60 CAPSULE, DELAYED RELEASE ORAL DAILY
Qty: 90 CAPSULE | Refills: 3 | Status: SHIPPED | OUTPATIENT
Start: 2020-07-29 | End: 2020-11-16 | Stop reason: ALTCHOICE

## 2020-07-29 RX ORDER — ESOMEPRAZOLE MAGNESIUM 40 MG/1
40 CAPSULE, DELAYED RELEASE ORAL DAILY
Qty: 90 CAPSULE | Refills: 3 | Status: SHIPPED | OUTPATIENT
Start: 2020-07-29 | End: 2021-05-17

## 2020-07-29 RX ORDER — GABAPENTIN 300 MG/1
CAPSULE ORAL
Qty: 90 CAPSULE | Refills: 0 | Status: SHIPPED | OUTPATIENT
Start: 2020-07-29 | End: 2020-12-28 | Stop reason: SDUPTHER

## 2020-07-29 RX ORDER — OXYBUTYNIN CHLORIDE 15 MG/1
15 TABLET, EXTENDED RELEASE ORAL DAILY
Qty: 90 TABLET | Refills: 3 | Status: SHIPPED | OUTPATIENT
Start: 2020-07-29 | End: 2021-05-04

## 2020-07-29 NOTE — TELEPHONE ENCOUNTER
Jacoby Stoll is calling to request a refill on the following medication(s):  Requested Prescriptions     Pending Prescriptions Disp Refills    DULoxetine (CYMBALTA) 60 MG extended release capsule 90 capsule 3     Sig: Take 1 capsule by mouth daily    esomeprazole (NEXIUM) 40 MG delayed release capsule 90 capsule 3     Sig: Take 1 capsule by mouth daily    gabapentin (NEURONTIN) 300 MG capsule 90 capsule 0     Sig: TAKE 1 CAPSULE DAILY    Insulin Glargine, 2 Unit Dial, (TOUJEO MAX SOLOSTAR) 300 UNIT/ML SOPN 9 pen 3     Sig: INJECT 65 UNITS SUBCUTANEOUSLY NIGHTLY    oxybutynin (DITROPAN XL) 15 MG extended release tablet 90 tablet 3     Sig: Take 1 tablet by mouth daily    Insulin Pen Needle 30G X 5 MM MISC 400 each 5     Si each by Does not apply route 4 times daily    nebivolol (BYSTOLIC) 5 MG tablet 30 tablet 0     Sig: Take 0.5 tablets by mouth daily    levothyroxine (SYNTHROID) 75 MCG tablet 30 tablet      Sig: Take 1 tablet by mouth Daily       Last Visit Date (If Applicable):      Next Visit Date:    2020

## 2020-10-14 ENCOUNTER — TELEPHONE (OUTPATIENT)
Dept: FAMILY MEDICINE CLINIC | Age: 85
End: 2020-10-14

## 2020-11-16 ENCOUNTER — OFFICE VISIT (OUTPATIENT)
Dept: FAMILY MEDICINE CLINIC | Age: 85
End: 2020-11-16
Payer: MEDICARE

## 2020-11-16 VITALS
WEIGHT: 201 LBS | BODY MASS INDEX: 34.48 KG/M2 | HEART RATE: 66 BPM | DIASTOLIC BLOOD PRESSURE: 82 MMHG | OXYGEN SATURATION: 99 % | SYSTOLIC BLOOD PRESSURE: 139 MMHG

## 2020-11-16 LAB
AVERAGE GLUCOSE: NORMAL
HBA1C MFR BLD: 8.2 %

## 2020-11-16 PROCEDURE — G8400 PT W/DXA NO RESULTS DOC: HCPCS | Performed by: FAMILY MEDICINE

## 2020-11-16 PROCEDURE — G8417 CALC BMI ABV UP PARAM F/U: HCPCS | Performed by: FAMILY MEDICINE

## 2020-11-16 PROCEDURE — G8484 FLU IMMUNIZE NO ADMIN: HCPCS | Performed by: FAMILY MEDICINE

## 2020-11-16 PROCEDURE — 99213 OFFICE O/P EST LOW 20 MIN: CPT | Performed by: FAMILY MEDICINE

## 2020-11-16 PROCEDURE — 99214 OFFICE O/P EST MOD 30 MIN: CPT | Performed by: FAMILY MEDICINE

## 2020-11-16 PROCEDURE — G8427 DOCREV CUR MEDS BY ELIG CLIN: HCPCS | Performed by: FAMILY MEDICINE

## 2020-11-16 PROCEDURE — 3052F HG A1C>EQUAL 8.0%<EQUAL 9.0%: CPT | Performed by: FAMILY MEDICINE

## 2020-11-16 PROCEDURE — 1090F PRES/ABSN URINE INCON ASSESS: CPT | Performed by: FAMILY MEDICINE

## 2020-11-16 PROCEDURE — 4040F PNEUMOC VAC/ADMIN/RCVD: CPT | Performed by: FAMILY MEDICINE

## 2020-11-16 PROCEDURE — 1036F TOBACCO NON-USER: CPT | Performed by: FAMILY MEDICINE

## 2020-11-16 PROCEDURE — 1123F ACP DISCUSS/DSCN MKR DOCD: CPT | Performed by: FAMILY MEDICINE

## 2020-11-16 RX ORDER — PEN NEEDLE, DIABETIC 31 GX5/16"
NEEDLE, DISPOSABLE MISCELLANEOUS
COMMUNITY
Start: 2020-09-04

## 2020-11-16 RX ORDER — INSULIN GLARGINE 100 [IU]/ML
INJECTION, SOLUTION SUBCUTANEOUS
COMMUNITY
Start: 2020-10-06 | End: 2022-06-13 | Stop reason: ALTCHOICE

## 2020-11-16 ASSESSMENT — PATIENT HEALTH QUESTIONNAIRE - PHQ9
2. FEELING DOWN, DEPRESSED OR HOPELESS: 0
SUM OF ALL RESPONSES TO PHQ QUESTIONS 1-9: 0
SUM OF ALL RESPONSES TO PHQ QUESTIONS 1-9: 0
1. LITTLE INTEREST OR PLEASURE IN DOING THINGS: 0
SUM OF ALL RESPONSES TO PHQ QUESTIONS 1-9: 0
SUM OF ALL RESPONSES TO PHQ9 QUESTIONS 1 & 2: 0

## 2020-11-16 NOTE — PROGRESS NOTES
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)     Type II or unspecified type diabetes mellitus without mention of complication, not stated as uncontrolled     Unspecified sleep apnea       Past Surgical History:   Procedure Laterality Date    APPENDECTOMY      CARDIAC CATHETERIZATION  2010    CARDIAC SURGERY  04/02/2011    repaired aorta    CARPAL TUNNEL RELEASE      COLONOSCOPY  2010    multiple colon polyps - repeat every 3 years   [address]      cataracts - Dr Mazin Rbiera, TOTAL ABDOMINAL      hemorrhaging, complete    THORACIC AORTIC ANEURYSM REPAIR      2011    THYROID SURGERY  1960    gland removed       Family History   Problem Relation Age of Onset    Heart Disease Mother     Heart Disease Father     Breast Cancer Sister         10 years ago     No Known Problems Sister     No Known Problems Sister     No Known Problems Sister        Social History     Tobacco Use    Smoking status: Never Smoker    Smokeless tobacco: Never Used   Substance Use Topics    Alcohol use: No      Current Outpatient Medications   Medication Sig Dispense Refill    BASAGLAR KWIKPEN 100 UNIT/ML injection pen INJECT 80 UNITS SUBCUTANEOUSLY ONCE DAILY      B-D UF III MINI PEN NEEDLES 31G X 5 MM MISC USE SYRINGE SUBCUTANEOUSLY 4 TIMES DAILY      Respiratory Therapy Supplies BUD 1 Device by Does not apply route daily New CPAP mask and tubing 1 Device 1    esomeprazole (NEXIUM) 40 MG delayed release capsule Take 1 capsule by mouth daily 90 capsule 3    gabapentin (NEURONTIN) 300 MG capsule TAKE 1 CAPSULE DAILY 90 capsule 0    Insulin Glargine, 2 Unit Dial, (TOUJEO MAX SOLOSTAR) 300 UNIT/ML SOPN INJECT 65 UNITS SUBCUTANEOUSLY NIGHTLY 9 pen 3    oxybutynin (DITROPAN XL) 15 MG extended release tablet Take 1 tablet by mouth daily 90 tablet 3    Insulin Pen Needle 30G X 5 MM MISC 1 each by Does not apply route 4 times daily 400 each 5    nebivolol (BYSTOLIC) 5 MG tablet Take 0.5 tablets by mouth daily 30 tablet 0    levothyroxine (SYNTHROID) 75 MCG tablet Take 1 tablet by mouth Daily 30 tablet 5    insulin regular human (HUMULIN R) 500 UNIT/ML concentrated injection vial Inject 60 Units into the skin 3 times daily (before meals)  3 vial 5    aspirin 81 MG chewable tablet Take 81 mg by mouth daily.  TRUE METRIX BLOOD GLUCOSE TEST strip TEST four times a day  0    Multiple Vitamins-Minerals (MULTIVITAMIN ADULT PO) Take 1 tablet by mouth daily      pravastatin (PRAVACHOL) 40 MG tablet Take 1 tablet by mouth daily      b complex vitamins capsule Take 1 capsule by mouth daily      VITAMIN D-3 (COLECALCIFEROL) 400 UNITS TABS Take 2,000 Units by mouth daily. No current facility-administered medications for this visit. Allergies   Allergen Reactions    Exenatide Other (See Comments)    Glucophage  [Metformin Hcl] Other (See Comments)    Hydromorphone     Invokana [Canagliflozin]      yeast    Lipitor  [Atorvastatin Calcium] Other (See Comments)    Other      novotwist    Glucophage [Metformin Hcl] Nausea And Vomiting    Ketorolac Nausea And Vomiting    Metformin      Other reaction(s):  Intolerance-unknown  Other reaction(s): Nausea And Vomiting       Health Maintenance   Topic Date Due    Annual Wellness Visit (AWV)  05/29/2019    Lipid screen  05/22/2021    TSH testing  05/22/2021    DTaP/Tdap/Td vaccine (2 - Td) 04/17/2023    DEXA (modify frequency per FRAX score)  Completed    Flu vaccine  Completed    Shingles Vaccine  Completed    Pneumococcal 65+ years Vaccine  Completed    Hepatitis A vaccine  Aged Out    Hib vaccine  Aged Out    Meningococcal (ACWY) vaccine  Aged Out       Subjective:      Review of right knee     2. Acquired hypothyroidism     3. Uncontrolled type 2 diabetes mellitus with diabetic polyneuropathy, with long-term current use of insulin (HCC)  Hemoglobin A1C    Hemoglobin A1C   4. Major depressive disorder with single episode, in partial remission ([facility] [address]     5. Gastroesophageal reflux disease without esophagitis       With the chronic knee pain has arthritis-- would recommend the voltaren gel on as needed basis for her pain. Would also consider heat and continue with her exercise as often as possible and tolerated. The diabetes is well controlled at this time -no changes in her current medications and stick to the gluten free diet. Watch for low blood sugars. Mood is well controlled on her current medications. GERD is asx. Continue to monitor for adverse events regularly. Continue on the iron daily and the B12-- we will recheck the levels in the spring. Continue with the voltaren gel as needed for the knee pain  Lab Results   Component Value Date    WBC 7.42 05/22/2019    HGB 12.1 05/22/2019    HCT 38.4 05/22/2019     05/22/2019    CHOL 169 05/22/2020    TRIG 169 (H) 05/22/2020    HDL 52 05/22/2020    ALT 12 05/22/2020    AST 12 05/22/2020     05/22/2020    K 5.0 05/22/2020     05/22/2020    CREATININE <0.40 (L) 05/22/2020    BUN 20 05/22/2020    CO2 31 05/22/2020    TSH 1.84 05/22/2020    LABA1C 8.2 11/16/2020    LABA1C 9.0 05/15/2020    LABA1C 8.1 01/15/2020    LABMICR 199 (H) 05/22/2020       Return in about 6 months (around 5/16/2021) for DM follow up, HTN. Patient given educational materials - see patientinstructions. Discussed use, benefit, and side effects of prescribed medications. All patient questions answered. Pt voiced understanding. Reviewed health maintenance. Instructed to continue current medications, diet andexercise. Patient agreed with treatment plan. Follow up as directed.      (Please note that portions of this note were completed with a voice-recognition program. Efforts were made to edit the dictation but occasionally words are mis-transcribed.)    Electronically signed by Nohemy Sampson MD on 11/22/2020

## 2020-11-16 NOTE — PATIENT INSTRUCTIONS
Continue on the iron daily and the B12-- we will recheck the levels in the spring.     Continue with the voltaren gel as needed for the knee pain

## 2020-12-28 RX ORDER — GABAPENTIN 300 MG/1
CAPSULE ORAL
Qty: 30 CAPSULE | Refills: 0 | Status: SHIPPED | OUTPATIENT
Start: 2020-12-28 | End: 2021-02-13

## 2020-12-28 RX ORDER — NEBIVOLOL 5 MG/1
2.5 TABLET ORAL DAILY
Qty: 15 TABLET | Refills: 0 | Status: SHIPPED | OUTPATIENT
Start: 2020-12-28 | End: 2021-02-13

## 2020-12-28 RX ORDER — INSULIN HUMAN 500 [IU]/ML
60 INJECTION, SOLUTION SUBCUTANEOUS
Qty: 1 VIAL | Refills: 0 | Status: SHIPPED | OUTPATIENT
Start: 2020-12-28 | End: 2021-09-02 | Stop reason: SDUPTHER

## 2021-02-12 DIAGNOSIS — E78.2 MIXED HYPERLIPIDEMIA: ICD-10-CM

## 2021-02-12 DIAGNOSIS — G89.29 CHRONIC PAIN OF RIGHT KNEE: Primary | ICD-10-CM

## 2021-02-12 DIAGNOSIS — M25.561 CHRONIC PAIN OF RIGHT KNEE: Primary | ICD-10-CM

## 2021-02-13 RX ORDER — GABAPENTIN 300 MG/1
CAPSULE ORAL
Qty: 30 CAPSULE | Refills: 0 | Status: SHIPPED | OUTPATIENT
Start: 2021-02-13 | End: 2021-03-11 | Stop reason: SDUPTHER

## 2021-02-13 RX ORDER — NEBIVOLOL HYDROCHLORIDE 5 MG/1
TABLET ORAL
Qty: 15 TABLET | Refills: 5 | Status: SHIPPED | OUTPATIENT
Start: 2021-02-13 | End: 2021-05-17 | Stop reason: ALTCHOICE

## 2021-02-13 NOTE — TELEPHONE ENCOUNTER
Cherelle Paulino is calling to request a refill on the following medication(s):  Requested Prescriptions     Pending Prescriptions Disp Refills    gabapentin (NEURONTIN) 300 MG capsule [Pharmacy Med Name: Gabapentin 300 MG Oral Capsule] 30 capsule 0     Sig: Take 1 capsule by mouth once daily    BYSTOLIC 5 MG tablet [Pharmacy Med Name: Bystolic 5 MG Oral Tablet] 15 tablet 0     Sig: Take 1/2 (one-half) tablet by mouth once daily       Last Visit Date (If Applicable):  95/25/8965    Next Visit Date:    5/17/2021

## 2021-03-10 DIAGNOSIS — G89.29 CHRONIC PAIN OF RIGHT KNEE: ICD-10-CM

## 2021-03-10 DIAGNOSIS — M25.561 CHRONIC PAIN OF RIGHT KNEE: ICD-10-CM

## 2021-03-10 RX ORDER — GABAPENTIN 300 MG/1
CAPSULE ORAL
Qty: 30 CAPSULE | Refills: 0 | OUTPATIENT
Start: 2021-03-10 | End: 2021-04-10

## 2021-03-10 NOTE — TELEPHONE ENCOUNTER
Marylen Artis is requesting a refill on the following medication(s):  Requested Prescriptions     Pending Prescriptions Disp Refills    gabapentin (NEURONTIN) 300 MG capsule 30 capsule 0     Sig: Take 1 capsule by mouth once daily       Last Visit Date (If Applicable):  14/47/4962    Next Visit Date:    5/17/2021

## 2021-03-10 NOTE — TELEPHONE ENCOUNTER
Andi Machado is requesting a refill on the following medication(s):  Requested Prescriptions     Pending Prescriptions Disp Refills    gabapentin (NEURONTIN) 300 MG capsule [Pharmacy Med Name: Gabapentin 300 MG Oral Capsule] 30 capsule 0     Sig: Take 1 capsule by mouth once daily       Last Visit Date (If Applicable):  70/27/0602    Next Visit Date:    5/17/2021

## 2021-03-11 RX ORDER — GABAPENTIN 300 MG/1
CAPSULE ORAL
Qty: 30 CAPSULE | Refills: 0 | Status: SHIPPED | OUTPATIENT
Start: 2021-03-11 | End: 2021-03-19

## 2021-03-18 DIAGNOSIS — G89.29 CHRONIC PAIN OF RIGHT KNEE: ICD-10-CM

## 2021-03-18 DIAGNOSIS — M25.561 CHRONIC PAIN OF RIGHT KNEE: ICD-10-CM

## 2021-03-19 NOTE — TELEPHONE ENCOUNTER
Alka Tomlin is requesting a refill on the following medication(s):  Requested Prescriptions     Pending Prescriptions Disp Refills    gabapentin (NEURONTIN) 300 MG capsule [Pharmacy Med Name: Gabapentin 300 MG Oral Capsule] 30 capsule 0     Sig: Take 1 capsule by mouth once daily       Last Visit Date (If Applicable):  17/30/3770    Next Visit Date:    5/17/2021

## 2021-03-20 RX ORDER — GABAPENTIN 300 MG/1
CAPSULE ORAL
Qty: 30 CAPSULE | Refills: 0 | Status: SHIPPED | OUTPATIENT
Start: 2021-03-20 | End: 2021-04-20

## 2021-04-01 ENCOUNTER — TELEPHONE (OUTPATIENT)
Dept: FAMILY MEDICINE CLINIC | Age: 86
End: 2021-04-01

## 2021-04-01 NOTE — TELEPHONE ENCOUNTER
Brain MRI ordered by Dr. Shaheed Hong showed an enhancing skull lesion 5 cm x 4.5 cm. No evidence of this lesion in the CT scan based on report from May 2019. Need to ensure there is follow-up of this abnormality. This was ordered due to peripheral vertigo.

## 2021-04-05 ENCOUNTER — OFFICE VISIT (OUTPATIENT)
Dept: FAMILY MEDICINE CLINIC | Age: 86
End: 2021-04-05
Payer: MEDICARE

## 2021-04-05 VITALS
BODY MASS INDEX: 34.72 KG/M2 | TEMPERATURE: 97.5 F | HEART RATE: 81 BPM | DIASTOLIC BLOOD PRESSURE: 92 MMHG | SYSTOLIC BLOOD PRESSURE: 160 MMHG | OXYGEN SATURATION: 96 % | HEIGHT: 64 IN | WEIGHT: 203.4 LBS

## 2021-04-05 DIAGNOSIS — G47.33 OBSTRUCTIVE SLEEP APNEA: ICD-10-CM

## 2021-04-05 DIAGNOSIS — D18.02 BRAIN HEMANGIOMA (HCC): Primary | ICD-10-CM

## 2021-04-05 DIAGNOSIS — R26.89 BALANCE PROBLEM: ICD-10-CM

## 2021-04-05 PROCEDURE — 1036F TOBACCO NON-USER: CPT | Performed by: FAMILY MEDICINE

## 2021-04-05 PROCEDURE — G8417 CALC BMI ABV UP PARAM F/U: HCPCS | Performed by: FAMILY MEDICINE

## 2021-04-05 PROCEDURE — G8427 DOCREV CUR MEDS BY ELIG CLIN: HCPCS | Performed by: FAMILY MEDICINE

## 2021-04-05 PROCEDURE — 1123F ACP DISCUSS/DSCN MKR DOCD: CPT | Performed by: FAMILY MEDICINE

## 2021-04-05 PROCEDURE — G8400 PT W/DXA NO RESULTS DOC: HCPCS | Performed by: FAMILY MEDICINE

## 2021-04-05 PROCEDURE — 4040F PNEUMOC VAC/ADMIN/RCVD: CPT | Performed by: FAMILY MEDICINE

## 2021-04-05 PROCEDURE — 99213 OFFICE O/P EST LOW 20 MIN: CPT | Performed by: FAMILY MEDICINE

## 2021-04-05 PROCEDURE — 99214 OFFICE O/P EST MOD 30 MIN: CPT | Performed by: FAMILY MEDICINE

## 2021-04-05 PROCEDURE — 1090F PRES/ABSN URINE INCON ASSESS: CPT | Performed by: FAMILY MEDICINE

## 2021-04-05 RX ORDER — FERROUS SULFATE 325(65) MG
325 TABLET ORAL DAILY
COMMUNITY
End: 2021-05-17

## 2021-04-05 RX ORDER — DULAGLUTIDE 0.75 MG/.5ML
INJECTION, SOLUTION SUBCUTANEOUS
COMMUNITY
Start: 2021-02-10 | End: 2022-02-21

## 2021-04-05 NOTE — PROGRESS NOTES
[facility]  [address]  Dept: [phone]  Dept UVQ:[phone]    Faith Sprague is a 80 y.o. female who presents today for her medical conditions/complaints as notedbelow. Faith Sprague is c/o of Results (MRI of head results to discuss)      HPI:     HPI    Saw Dr. Shakir Burgos and at his evaluation he recommended an MRI due to her ongoing dizziness. The MRI showed a 4.5 x 5 cm lesion likely a hemangioma in the right posterior parietal lobe (contrast enhanced study). Liliana Lopez is here today to further discuss results and next steps. She has had chronic vertigo episodes and the ongoing chronic hearing loss but no other new neurologic symptoms. She denies any head injuries or headaches. No nausea or vomiting at this time. She has a family history of a \"blood clot on the brain\" in her mother who required surgery to remove this. She remembers her mother being told it was likely from hitting her head but her mother did not remember hitting her head. Did go out to [place] and needs to have more of her CPAP supplies. DOes use the CPAP regularly. Has seen some improvement in her sleep with this although is still tired all the time. Diabetes has been stable. She has had no recent hypoglycemic episodes and no falls.       BP Readings from Last 3 Encounters:   04/05/21 (!) 160/92   11/16/20 139/82   05/15/20 132/70          (goal 120/80)    Wt Readings from Last 3 Encounters:   04/05/21 203 lb 6.4 oz (92.3 kg)   11/16/20 201 lb (91.2 kg)   05/15/20 200 lb (90.7 kg)        Past Medical History:   Diagnosis Date    CAD (coronary artery disease)     Depression     Hypertension     Hypothyroidism     Major depressive disorder with single episode, in partial remission ([facility] [address] 10/14/2018    Neuropathy     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)     Type II or unspecified type diabetes mellitus without mention of complication, not stated as uncontrolled     Unspecified sleep apnea       Past Surgical History:   Procedure Laterality Date    APPENDECTOMY      CARDIAC CATHETERIZATION  2010    CARDIAC SURGERY  04/02/2011    repaired aorta    CARPAL TUNNEL RELEASE      COLONOSCOPY  2010    multiple colon polyps - repeat every 3 years   [facility]      cataracts - Dr Sudarshan Cortez, TOTAL ABDOMINAL      hemorrhaging, complete    THORACIC AORTIC ANEURYSM REPAIR      2011    THYROID SURGERY  1960    gland removed       Family History   Problem Relation Age of Onset    Heart Disease Mother     Heart Disease Father     Breast Cancer Sister         10 years ago     No Known Problems Sister     No Known Problems Sister     No Known Problems Sister        Social History     Tobacco Use    Smoking status: Never Smoker    Smokeless tobacco: Never Used   Substance Use Topics    Alcohol use: No      Current Outpatient Medications   Medication Sig Dispense Refill    TRULICITY 5.90 NQ/1.2UK SOPN INJECT 1 SUBCUTANEOUSLY ONCE A WEEK      ferrous sulfate (IRON 325) 325 (65 Fe) MG tablet Take 325 mg by mouth daily      gabapentin (NEURONTIN) 300 MG capsule Take 1 capsule by mouth once daily 30 capsule 0    BYSTOLIC 5 MG tablet Take 1/2 (one-half) tablet by mouth once daily 15 tablet 5    insulin regular human (HUMULIN R) 500 UNIT/ML concentrated injection vial Inject 60 Units into the skin 3 times daily (before meals) 1 vial 0    BASAGLAR KWIKPEN 100 UNIT/ML injection pen INJECT 80 UNITS SUBCUTANEOUSLY ONCE DAILY      B-D UF III MINI PEN NEEDLES 31G X 5 MM MISC USE SYRINGE SUBCUTANEOUSLY 4 TIMES DAILY      Respiratory Therapy Supplies BUD 1 Device by Does not apply route daily New CPAP mask and tubing 1 Device 1    esomeprazole (NEXIUM) 40 MG delayed release capsule Take 1 capsule by mouth daily 90 capsule 3    Insulin Glargine, 2 Unit Dial, (TOUJEO MAX SOLOSTAR) 300 UNIT/ML SOPN INJECT 65 UNITS SUBCUTANEOUSLY NIGHTLY 9 pen 3    oxybutynin (DITROPAN XL) 15 MG extended release tablet Take 1 tablet by mouth daily 90 tablet 3    Insulin Pen Needle 30G X 5 MM MISC 1 each by Does not apply route 4 times daily 400 each 5    levothyroxine (SYNTHROID) 75 MCG tablet Take 1 tablet by mouth Daily 30 tablet 5    aspirin 81 MG chewable tablet Take 81 mg by mouth daily.  TRUE METRIX BLOOD GLUCOSE TEST strip TEST four times a day  0    Multiple Vitamins-Minerals (MULTIVITAMIN ADULT PO) Take 1 tablet by mouth daily      pravastatin (PRAVACHOL) 40 MG tablet Take 1 tablet by mouth daily      b complex vitamins capsule Take 1 capsule by mouth daily      VITAMIN D-3 (COLECALCIFEROL) 400 UNITS TABS Take 2,000 Units by mouth daily. No current facility-administered medications for this visit. Allergies   Allergen Reactions    Exenatide Other (See Comments)    Glucophage  [Metformin Hcl] Other (See Comments)    Hydromorphone     Invokana [Canagliflozin]      yeast    Lipitor  [Atorvastatin Calcium] Other (See Comments)    Other      novotwist    Glucophage [Metformin Hcl] Nausea And Vomiting    Ketorolac Nausea And Vomiting    Metformin      Other reaction(s):  Intolerance-unknown  Other reaction(s): Nausea And Vomiting       Health Maintenance   Topic Date Due    Annual Wellness Visit (AWV)  Never done    Lipid screen  05/22/2021    TSH testing  05/22/2021    DTaP/Tdap/Td vaccine (2 - Td) 04/17/2023    DEXA (modify frequency per FRAX score)  Completed    Flu vaccine  Completed    Shingles Vaccine  Completed    Pneumococcal 65+ years Vaccine  Completed    COVID-19 Vaccine  Completed    Hepatitis A vaccine  Aged Out    Hib vaccine  Aged Out    Meningococcal (ACWY) vaccine  Aged Out       Subjective:      Review of Systems    Objective:     BP (!) 160/92 (Site: Right Upper Arm, Position: Sitting, Cuff Size: Medium Adult) Pulse 81   Temp 97.5 °F (36.4 °C) (Temporal)   Ht 5' 4\" (1.626 m)   Wt 203 lb 6.4 oz (92.3 kg)   LMP 04/24/1987 (Approximate)   SpO2 96%   BMI 34.91 kg/m²     Physical Exam  Vitals signs and nursing note reviewed. Constitutional:       Appearance: Normal appearance. She is well-developed. HENT:      Head: Normocephalic and atraumatic. Right Ear: Tympanic membrane and external ear normal.      Left Ear: Tympanic membrane and external ear normal.      Mouth/Throat:      Pharynx: No oropharyngeal exudate. Eyes:      General: No scleral icterus. Right eye: No discharge. Left eye: No discharge. Extraocular Movements: Extraocular movements intact. Conjunctiva/sclera: Conjunctivae normal.      Pupils: Pupils are equal, round, and reactive to light. Neck:      Musculoskeletal: Normal range of motion and neck supple. Thyroid: No thyromegaly. Vascular: No JVD. Cardiovascular:      Rate and Rhythm: Normal rate and regular rhythm. Pulses: Normal pulses. Heart sounds: Normal heart sounds. No murmur. No friction rub. No gallop. Pulmonary:      Effort: Pulmonary effort is normal. No respiratory distress. Breath sounds: Normal breath sounds. Abdominal:      General: Abdomen is flat. Bowel sounds are normal.      Palpations: Abdomen is soft. Musculoskeletal:         General: No swelling or tenderness. Right lower leg: No edema. Left lower leg: No edema. Lymphadenopathy:      Cervical: No cervical adenopathy. Skin:     General: Skin is warm. Capillary Refill: Capillary refill takes less than 2 seconds. Neurological:      Mental Status: She is alert and oriented to person, place, and time. Cranial Nerves: No cranial nerve deficit. Coordination: Coordination normal.   Psychiatric:         Mood and Affect: Mood normal.         Behavior: Behavior normal.         Thought Content:  Thought content normal.         Judgment: Judgment

## 2021-04-15 ENCOUNTER — OFFICE VISIT (OUTPATIENT)
Dept: NEUROLOGY | Age: 86
End: 2021-04-15
Payer: MEDICARE

## 2021-04-15 VITALS
BODY MASS INDEX: 34.66 KG/M2 | DIASTOLIC BLOOD PRESSURE: 82 MMHG | HEART RATE: 78 BPM | OXYGEN SATURATION: 96 % | WEIGHT: 203 LBS | HEIGHT: 64 IN | SYSTOLIC BLOOD PRESSURE: 137 MMHG

## 2021-04-15 DIAGNOSIS — R42 DIZZINESS: Primary | ICD-10-CM

## 2021-04-15 DIAGNOSIS — D18.09 HEMANGIOMA OF BONE: ICD-10-CM

## 2021-04-15 PROCEDURE — 4040F PNEUMOC VAC/ADMIN/RCVD: CPT | Performed by: PSYCHIATRY & NEUROLOGY

## 2021-04-15 PROCEDURE — G8400 PT W/DXA NO RESULTS DOC: HCPCS | Performed by: PSYCHIATRY & NEUROLOGY

## 2021-04-15 PROCEDURE — G8427 DOCREV CUR MEDS BY ELIG CLIN: HCPCS | Performed by: PSYCHIATRY & NEUROLOGY

## 2021-04-15 PROCEDURE — 1090F PRES/ABSN URINE INCON ASSESS: CPT | Performed by: PSYCHIATRY & NEUROLOGY

## 2021-04-15 PROCEDURE — 99204 OFFICE O/P NEW MOD 45 MIN: CPT | Performed by: PSYCHIATRY & NEUROLOGY

## 2021-04-15 PROCEDURE — 1123F ACP DISCUSS/DSCN MKR DOCD: CPT | Performed by: PSYCHIATRY & NEUROLOGY

## 2021-04-15 PROCEDURE — G8417 CALC BMI ABV UP PARAM F/U: HCPCS | Performed by: PSYCHIATRY & NEUROLOGY

## 2021-04-15 PROCEDURE — 1036F TOBACCO NON-USER: CPT | Performed by: PSYCHIATRY & NEUROLOGY

## 2021-04-15 RX ORDER — DULOXETIN HYDROCHLORIDE 60 MG/1
60 CAPSULE, DELAYED RELEASE ORAL DAILY
COMMUNITY
End: 2021-05-10 | Stop reason: SDUPTHER

## 2021-04-15 ASSESSMENT — ENCOUNTER SYMPTOMS
PHOTOPHOBIA: 0
COLOR CHANGE: 0
SHORTNESS OF BREATH: 0
COUGH: 0
VOMITING: 0
VOICE CHANGE: 0
NAUSEA: 0

## 2021-04-15 NOTE — PROGRESS NOTES
Endovascular Neurosurgery - [address],  [address] [facility], [address] [place] [address]  P: [phone]  F: [phone]      Dear Dr. Charline Avelar    HPI:     HPI    Chiara Mason is a 80 y.o. female who presents today for review of imaging findings as part of her work-up for positional vertigo by her ENT doctor. She has had symptoms of dizziness that occurs while she is sitting in addition to certain motions of her head. These episodes come and go. As part of the work-up she was evaluated by ENT with MRI brain with and without gadolinium ordered to evaluate for any neuromas or masses. As part of the work-up there demonstrated a right parietal skull based hemangioma. The brain parenchyma demonstrates no evidence of stroke or any other lesions. Given the benign lesion involving the skull. No further intervention is recommended at this time. Due to concern of the frequent dizziness and to ensure no vertebrobasilar insufficiency and MRA head was added. Review of prior carotid ultrasounds demonstrated less than 50% bilateral carotid stenosis      Allergies   Allergen Reactions    Exenatide Other (See Comments)    Glucophage  [Metformin Hcl] Other (See Comments)    Hydromorphone     Invokana [Canagliflozin]      yeast    Lipitor  [Atorvastatin Calcium] Other (See Comments)    Other      novotwist    Glucophage [Metformin Hcl] Nausea And Vomiting    Ketorolac Nausea And Vomiting    Metformin      Other reaction(s):  Intolerance-unknown  Other reaction(s): Nausea And Vomiting     Current Outpatient Medications   Medication Sig Dispense Refill    DULoxetine (CYMBALTA) 60 MG extended release capsule Take 60 mg by mouth daily      TRULICITY 9.78 TZ/0.4KJ SOPN INJECT 1 SUBCUTANEOUSLY ONCE A WEEK      ferrous sulfate (IRON 325) 325 (65 Fe) MG tablet Take 325 mg by mouth daily      gabapentin (NEURONTIN) 300 MG capsule Take 1 capsule by mouth once daily 30 capsule 0    BYSTOLIC 5 MG tablet Take 1/2 (one-half) tablet by mouth once daily 15 tablet 5    insulin regular human (HUMULIN R) 500 UNIT/ML concentrated injection vial Inject 60 Units into the skin 3 times daily (before meals) 1 vial 0    BASAGLAR KWIKPEN 100 UNIT/ML injection pen INJECT 80 UNITS SUBCUTANEOUSLY ONCE DAILY      B-D UF III MINI PEN NEEDLES 31G X 5 MM MISC USE SYRINGE SUBCUTANEOUSLY 4 TIMES DAILY      Respiratory Therapy Supplies BUD 1 Device by Does not apply route daily New CPAP mask and tubing 1 Device 1    esomeprazole (NEXIUM) 40 MG delayed release capsule Take 1 capsule by mouth daily 90 capsule 3    oxybutynin (DITROPAN XL) 15 MG extended release tablet Take 1 tablet by mouth daily 90 tablet 3    Insulin Pen Needle 30G X 5 MM MISC 1 each by Does not apply route 4 times daily 400 each 5    levothyroxine (SYNTHROID) 75 MCG tablet Take 1 tablet by mouth Daily 30 tablet 5    aspirin 81 MG chewable tablet Take 81 mg by mouth daily.  TRUE METRIX BLOOD GLUCOSE TEST strip TEST four times a day  0    Multiple Vitamins-Minerals (MULTIVITAMIN ADULT PO) Take 1 tablet by mouth daily      pravastatin (PRAVACHOL) 40 MG tablet Take 1 tablet by mouth daily      b complex vitamins capsule Take 1 capsule by mouth daily      Insulin Glargine, 2 Unit Dial, (TOUJEO MAX SOLOSTAR) 300 UNIT/ML SOPN INJECT 65 UNITS SUBCUTANEOUSLY NIGHTLY (Patient not taking: Reported on 4/15/2021) 9 pen 3    VITAMIN D-3 (COLECALCIFEROL) 400 UNITS TABS Take 2,000 Units by mouth daily. No current facility-administered medications for this visit.       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)     Type II or unspecified type diabetes mellitus without mention of complication, not stated as uncontrolled     Unspecified sleep apnea       Past Surgical History:   Procedure Laterality Date    APPENDECTOMY      CARDIAC CATHETERIZATION  2010   [place] CARDIAC SURGERY  04/02/2011    repaired aorta    CARPAL TUNNEL RELEASE      COLONOSCOPY  2010    multiple colon polyps - repeat every 3 years   [facility]      cataracts - Dr Juana Ellington, TOTAL ABDOMINAL      hemorrhaging, complete    THORACIC AORTIC ANEURYSM REPAIR      2011    THYROID SURGERY  1960    gland removed     Family History   Problem Relation Age of Onset    Heart Disease Mother     Heart Disease Father     Breast Cancer Sister         10 years ago     No Known Problems Sister     No Known Problems Sister     No Known Problems Sister      Social History     Tobacco Use    Smoking status: Never Smoker    Smokeless tobacco: Never Used   Substance Use Topics    Alcohol use: No        Subjective:     Review of Systems   Constitutional: Negative for fever and unexpected weight change. HENT: Negative for voice change. Eyes: Negative for photophobia and visual disturbance. Respiratory: Negative for cough and shortness of breath. Cardiovascular: Negative for chest pain and palpitations. Gastrointestinal: Negative for nausea and vomiting. Musculoskeletal: Negative for neck stiffness. Skin: Negative for color change and pallor. Neurological: Positive for dizziness. Negative for weakness and headaches. Psychiatric/Behavioral: Negative for confusion and decreased concentration.          Objective:     /82   Pulse 78   Ht 5' 4\" (1.626 m)   Wt 203 lb (92.1 kg)   LMP 04/24/1987 (Approximate)   SpO2 96%   BMI 34.84 kg/m²   Physical Exam  GEN: Sitting in chair, NAD  CV: RRR  Neuro: Alert and oriented x3, intact language, attention, knowledge  CN: EOMI, PERRL, no nystagmus, V1 through V3 intact, no facial asymmetry, midline tongue  Motor: 5/5 BUE/BLE  Sensory: Intact to light touch  Coordination: No dysmetria. MRI brain. 5cm skull based hemangioma in right parietal skull. Assessment:        Issac Dominguez is a 80 y.o. female who has history of htn, t2dm and incidental right parietal bony skull hemangioma   Diagnosis Orders   1. Dizziness  MRA HEAD WO CONTRAST   2. Hemangioma of bone         Recommendations:      Return if symptoms worsen or fail to improve. Orders Placed This Encounter   Procedures    MRA HEAD WO CONTRAST     Standing Status:   Future     Standing Expiration Date:   4/15/2022     Order Specific Question:   Reason for exam:     Answer:   dizziness, vertigo     No orders of the defined types were placed in this encounter. 1. 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 May consider vestibular rehab  3. Follow up as needed. No further imaging needed for the skull based hemangioma. 4. Cont aspirin, pravastatin and DM control for stroke risk factor prevention    New Patient Visit Time:  45 minutes  Time Spent in Counselin minutes  Greater than 50% of the time in this visit was spent counseling and coordinating care of this patient. Discussed use, benefit, and side effects of prescribed medications. Personally reviewed imaging with patients and all questions answered. Pt voiced understanding. Patient agreed with treatment plan. Follow up as directed above. If you have any questions, please do not hesitate to call me.   I look forward to following Issac Dominguez      Sincerely,        Polo Gonzalez MD  Electronically signed by Polo Gonzalez MD on 4/15/2021 at 2:25 PM

## 2021-04-20 DIAGNOSIS — M25.561 CHRONIC PAIN OF RIGHT KNEE: ICD-10-CM

## 2021-04-20 DIAGNOSIS — G89.29 CHRONIC PAIN OF RIGHT KNEE: ICD-10-CM

## 2021-04-20 NOTE — TELEPHONE ENCOUNTER
Daniel Knight is requesting a refill on the following medication(s):  Requested Prescriptions     Pending Prescriptions Disp Refills    gabapentin (NEURONTIN) 300 MG capsule [Pharmacy Med Name: Gabapentin 300 MG Oral Capsule] 30 capsule 0     Sig: Take 1 capsule by mouth once daily       Last Visit Date (If Applicable):  7/7/7563    Next Visit Date:    5/17/2021

## 2021-04-21 RX ORDER — GABAPENTIN 300 MG/1
CAPSULE ORAL
Qty: 30 CAPSULE | Refills: 0 | Status: SHIPPED | OUTPATIENT
Start: 2021-04-21 | End: 2021-05-10 | Stop reason: SDUPTHER

## 2021-05-03 DIAGNOSIS — N32.81 OVERACTIVE BLADDER: ICD-10-CM

## 2021-05-04 RX ORDER — OXYBUTYNIN CHLORIDE 15 MG/1
TABLET, EXTENDED RELEASE ORAL
Qty: 90 TABLET | Refills: 3 | Status: SHIPPED | OUTPATIENT
Start: 2021-05-04 | End: 2022-03-24

## 2021-05-10 DIAGNOSIS — G89.29 CHRONIC PAIN OF RIGHT KNEE: ICD-10-CM

## 2021-05-10 DIAGNOSIS — E03.9 ACQUIRED HYPOTHYROIDISM: ICD-10-CM

## 2021-05-10 DIAGNOSIS — M25.561 CHRONIC PAIN OF RIGHT KNEE: ICD-10-CM

## 2021-05-11 RX ORDER — DULOXETIN HYDROCHLORIDE 60 MG/1
60 CAPSULE, DELAYED RELEASE ORAL DAILY
Qty: 90 CAPSULE | Refills: 3 | Status: SHIPPED | OUTPATIENT
Start: 2021-05-11 | End: 2022-08-01

## 2021-05-11 RX ORDER — GABAPENTIN 300 MG/1
CAPSULE ORAL
Qty: 90 CAPSULE | Refills: 0 | Status: SHIPPED | OUTPATIENT
Start: 2021-05-11 | End: 2021-09-15

## 2021-05-11 RX ORDER — LEVOTHYROXINE SODIUM 0.07 MG/1
75 TABLET ORAL DAILY
Qty: 90 TABLET | Refills: 3 | Status: SHIPPED | OUTPATIENT
Start: 2021-05-11 | End: 2022-09-15 | Stop reason: SDUPTHER

## 2021-05-17 ENCOUNTER — OFFICE VISIT (OUTPATIENT)
Dept: FAMILY MEDICINE CLINIC | Age: 86
End: 2021-05-17
Payer: MEDICARE

## 2021-05-17 ENCOUNTER — HOSPITAL ENCOUNTER (OUTPATIENT)
Age: 86
Setting detail: SPECIMEN
Discharge: HOME | End: 2021-05-17
Payer: MEDICARE

## 2021-05-17 VITALS
SYSTOLIC BLOOD PRESSURE: 114 MMHG | HEART RATE: 79 BPM | TEMPERATURE: 97.6 F | BODY MASS INDEX: 34.36 KG/M2 | WEIGHT: 200.2 LBS | OXYGEN SATURATION: 94 % | DIASTOLIC BLOOD PRESSURE: 56 MMHG

## 2021-05-17 DIAGNOSIS — E78.2 MIXED HYPERLIPIDEMIA: ICD-10-CM

## 2021-05-17 DIAGNOSIS — E66.01 SEVERE OBESITY (BMI 35.0-39.9) WITH COMORBIDITY (HCC): ICD-10-CM

## 2021-05-17 DIAGNOSIS — E03.9 ACQUIRED HYPOTHYROIDISM: ICD-10-CM

## 2021-05-17 DIAGNOSIS — E55.9 VITAMIN D DEFICIENCY: ICD-10-CM

## 2021-05-17 DIAGNOSIS — I25.10 ATHEROSCLEROSIS OF NATIVE CORONARY ARTERY OF NATIVE HEART WITHOUT ANGINA PECTORIS: ICD-10-CM

## 2021-05-17 LAB
ALBUMIN SERPL-MCNC: 4 G/DL (ref 3.5–5.2)
ALBUMIN/GLOBULIN RATIO: 1.5 (ref 1–2.5)
ALP BLD-CCNC: 90 U/L (ref 35–104)
ALT SERPL-CCNC: 12 U/L (ref 5–33)
ANION GAP SERPL CALCULATED.3IONS-SCNC: 9 MMOL/L (ref 9–17)
AST SERPL-CCNC: 16 U/L
BILIRUB SERPL-MCNC: 0.5 MG/DL (ref 0.3–1.2)
BUN BLDV-MCNC: 15 MG/DL (ref 8–23)
BUN/CREAT BLD: 32 (ref 9–20)
CALCIUM SERPL-MCNC: 9.3 MG/DL (ref 8.6–10.4)
CHLORIDE BLD-SCNC: 102 MMOL/L (ref 98–107)
CO2: 30 MMOL/L (ref 20–31)
CREAT SERPL-MCNC: 0.47 MG/DL (ref 0.5–0.9)
GFR AFRICAN AMERICAN: >60 ML/MIN
GFR NON-AFRICAN AMERICAN: >60 ML/MIN
GFR SERPL CREATININE-BSD FRML MDRD: ABNORMAL ML/MIN/{1.73_M2}
GFR SERPL CREATININE-BSD FRML MDRD: ABNORMAL ML/MIN/{1.73_M2}
GLUCOSE BLD-MCNC: 141 MG/DL (ref 70–99)
POTASSIUM SERPL-SCNC: 4.1 MMOL/L (ref 3.7–5.3)
SODIUM BLD-SCNC: 141 MMOL/L (ref 135–144)
TOTAL PROTEIN: 6.7 G/DL (ref 6.4–8.3)
TSH SERPL DL<=0.05 MIU/L-ACNC: 1.29 MIU/L (ref 0.3–5)

## 2021-05-17 PROCEDURE — G8427 DOCREV CUR MEDS BY ELIG CLIN: HCPCS | Performed by: FAMILY MEDICINE

## 2021-05-17 PROCEDURE — 99211 OFF/OP EST MAY X REQ PHY/QHP: CPT | Performed by: FAMILY MEDICINE

## 2021-05-17 PROCEDURE — 84443 ASSAY THYROID STIM HORMONE: CPT

## 2021-05-17 PROCEDURE — 82306 VITAMIN D 25 HYDROXY: CPT

## 2021-05-17 PROCEDURE — 1090F PRES/ABSN URINE INCON ASSESS: CPT | Performed by: FAMILY MEDICINE

## 2021-05-17 PROCEDURE — 1036F TOBACCO NON-USER: CPT | Performed by: FAMILY MEDICINE

## 2021-05-17 PROCEDURE — G8417 CALC BMI ABV UP PARAM F/U: HCPCS | Performed by: FAMILY MEDICINE

## 2021-05-17 PROCEDURE — 36415 COLL VENOUS BLD VENIPUNCTURE: CPT

## 2021-05-17 PROCEDURE — 1123F ACP DISCUSS/DSCN MKR DOCD: CPT | Performed by: FAMILY MEDICINE

## 2021-05-17 PROCEDURE — 80061 LIPID PANEL: CPT

## 2021-05-17 PROCEDURE — 80053 COMPREHEN METABOLIC PANEL: CPT

## 2021-05-17 PROCEDURE — G8400 PT W/DXA NO RESULTS DOC: HCPCS | Performed by: FAMILY MEDICINE

## 2021-05-17 PROCEDURE — 4040F PNEUMOC VAC/ADMIN/RCVD: CPT | Performed by: FAMILY MEDICINE

## 2021-05-17 PROCEDURE — 99214 OFFICE O/P EST MOD 30 MIN: CPT | Performed by: FAMILY MEDICINE

## 2021-05-17 PROCEDURE — 84439 ASSAY OF FREE THYROXINE: CPT

## 2021-05-17 SDOH — ECONOMIC STABILITY: FOOD INSECURITY: WITHIN THE PAST 12 MONTHS, THE FOOD YOU BOUGHT JUST DIDN'T LAST AND YOU DIDN'T HAVE MONEY TO GET MORE.: NEVER TRUE

## 2021-05-17 SDOH — ECONOMIC STABILITY: FOOD INSECURITY: WITHIN THE PAST 12 MONTHS, YOU WORRIED THAT YOUR FOOD WOULD RUN OUT BEFORE YOU GOT MONEY TO BUY MORE.: NEVER TRUE

## 2021-05-17 NOTE — PROGRESS NOTES
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)     Type II or unspecified type diabetes mellitus without mention of complication, not stated as uncontrolled     Unspecified sleep apnea       Past Surgical History:   Procedure Laterality Date    APPENDECTOMY      CARDIAC CATHETERIZATION  2010    CARDIAC SURGERY  04/02/2011    repaired aorta    CARPAL TUNNEL RELEASE      COLONOSCOPY  2010    multiple colon polyps - repeat every 3 years   Rosemary Palma      cataracts - Dr Marquise Reyes, TOTAL ABDOMINAL      hemorrhaging, complete    THORACIC AORTIC ANEURYSM REPAIR      2011    THYROID SURGERY  1960    gland removed       Family History   Problem Relation Age of Onset    Heart Disease Mother     Heart Disease Father     Breast Cancer Sister         10 years ago     No Known Problems Sister     No Known Problems Sister     No Known Problems Sister        Social History     Tobacco Use    Smoking status: Never Smoker    Smokeless tobacco: Never Used   Substance Use Topics    Alcohol use: No      Current Outpatient Medications   Medication Sig Dispense Refill    DULoxetine (CYMBALTA) 60 MG extended release capsule Take 1 capsule by mouth daily 90 capsule 3    gabapentin (NEURONTIN) 300 MG capsule Take 1 capsule by mouth once daily 90 capsule 0    levothyroxine (SYNTHROID) 75 MCG tablet Take 1 tablet by mouth Daily 90 tablet 3    oxybutynin (DITROPAN XL) 15 MG extended release tablet TAKE 1 TABLET EVERY DAY 90 tablet 3    TRULICITY 3.09 XC/9.6AY SOPN INJECT 1 SUBCUTANEOUSLY ONCE A WEEK      insulin regular human (HUMULIN R) 500 UNIT/ML concentrated injection vial Inject 60 Units into the skin 3 times daily (before meals) 1 vial 0    BASAGLAR KWIKPEN 100 UNIT/ML injection pen INJECT 80 UNITS SUBCUTANEOUSLY ONCE DAILY      aspirin 81 MG chewable tablet Take 81 mg by mouth daily.  Multiple Vitamins-Minerals (MULTIVITAMIN ADULT PO) Take 1 tablet by mouth daily      pravastatin (PRAVACHOL) 40 MG tablet Take 1 tablet by mouth daily      b complex vitamins capsule Take 1 capsule by mouth daily      VITAMIN D-3 (COLECALCIFEROL) 400 UNITS TABS Take 2,000 Units by mouth daily.  B-D UF III MINI PEN NEEDLES 31G X 5 MM MISC USE SYRINGE SUBCUTANEOUSLY 4 TIMES DAILY      Respiratory Therapy Supplies BUD 1 Device by Does not apply route daily New CPAP mask and tubing 1 Device 1    Insulin Pen Needle 30G X 5 MM MISC 1 each by Does not apply route 4 times daily 400 each 5    TRUE METRIX BLOOD GLUCOSE TEST strip TEST four times a day  0     No current facility-administered medications for this visit. Allergies   Allergen Reactions    Exenatide Other (See Comments)    Glucophage  [Metformin Hcl] Other (See Comments)    Hydromorphone     Invokana [Canagliflozin]      yeast    Lipitor  [Atorvastatin Calcium] Other (See Comments)    Other      novotwist    Glucophage [Metformin Hcl] Nausea And Vomiting    Ketorolac Nausea And Vomiting    Metformin      Other reaction(s):  Intolerance-unknown  Other reaction(s): Nausea And Vomiting       Health Maintenance   Topic Date Due    Annual Wellness Visit (AWV)  Never done    Lipid screen  05/17/2022    TSH testing  05/17/2022    DTaP/Tdap/Td vaccine (2 - Td) 04/17/2023    DEXA (modify frequency per FRAX score)  Completed    Flu vaccine  Completed    Shingles Vaccine  Completed    Pneumococcal 65+ years Vaccine  Completed    COVID-19 Vaccine  Completed    Hepatitis A vaccine  Aged Out    Hib vaccine  Aged Out    Meningococcal (ACWY) vaccine  Aged Out       Subjective:      Review of Systems    Objective:     BP (!) 114/56 (Site: Right Upper Arm, Position: Sitting, Cuff Size: Large Adult)   Pulse 79   Temp 97.6 °F (36.4 °C) Wt 200 lb 3.2 oz (90.8 kg)   LMP 04/24/1987 (Approximate)   SpO2 94%   BMI 34.36 kg/m²     Physical Exam  Vitals and nursing note reviewed. Constitutional:       Appearance: Normal appearance. She is well-developed. HENT:      Head: Normocephalic and atraumatic. Right Ear: External ear normal.      Left Ear: External ear normal.      Nose: Nose normal.      Mouth/Throat:      Pharynx: No oropharyngeal exudate. Eyes:      Conjunctiva/sclera: Conjunctivae normal.   Neck:      Thyroid: No thyromegaly. Vascular: No JVD. Cardiovascular:      Rate and Rhythm: Normal rate and regular rhythm. Heart sounds: Normal heart sounds. No murmur heard. No friction rub. No gallop. Pulmonary:      Effort: Pulmonary effort is normal. No respiratory distress. Breath sounds: Normal breath sounds. Abdominal:      Palpations: Abdomen is soft. There is no mass. Tenderness: There is no abdominal tenderness. There is no right CVA tenderness or left CVA tenderness. Musculoskeletal:         General: Tenderness present. Cervical back: Normal range of motion and neck supple. Right lower leg: No edema. Left lower leg: No edema. Comments: Has chronic OA deformity changes   Lymphadenopathy:      Cervical: No cervical adenopathy. Skin:     General: Skin is warm. Capillary Refill: Capillary refill takes less than 2 seconds. Neurological:      General: No focal deficit present. Mental Status: She is alert and oriented to person, place, and time. Cranial Nerves: No cranial nerve deficit. Coordination: Coordination normal.   Psychiatric:         Mood and Affect: Mood normal.         Behavior: Behavior normal.         Thought Content: Thought content normal.         Judgment: Judgment normal.         Assessment/Plan:     1.  Uncontrolled type 2 diabetes mellitus with diabetic polyneuropathy, with long-term current use of insulin ([facility]  Assessment & Plan:  Continue voiced understanding. Reviewed health maintenance. Instructed to continue current medications, diet andexercise. Patient agreed with treatment plan. Follow up as directed.      (Please note that portions of this note were completed with a voice-recognition program. Efforts were made to edit the dictation but occasionally words are mis-transcribed.)    Electronically signed by Sergey Freeman MD on 5/23/2021

## 2021-05-17 NOTE — PATIENT INSTRUCTIONS
Check with [facility] and see if the higher price on the Trulicity is only until her deductible is met or if this will be all the time. If you cannot afford this then would call the endocrinologist about another option.

## 2021-05-18 LAB
CHOLESTEROL/HDL RATIO: 3.3
CHOLESTEROL: 177 MG/DL
HDLC SERPL-MCNC: 53 MG/DL
LDL CHOLESTEROL: 82 MG/DL (ref 0–130)
THYROXINE, FREE: 1.03 NG/DL (ref 0.93–1.7)
TRIGL SERPL-MCNC: 211 MG/DL
VITAMIN D 25-HYDROXY: 25.7 NG/ML (ref 30–100)
VLDLC SERPL CALC-MCNC: ABNORMAL MG/DL (ref 1–30)

## 2021-05-22 ENCOUNTER — TELEPHONE (OUTPATIENT)
Dept: FAMILY MEDICINE CLINIC | Age: 86
End: 2021-05-22

## 2021-05-22 NOTE — TELEPHONE ENCOUNTER
Patient notified and will fix in chart, will take the 5000 daily, she is wondering if her  could take the D3 also?

## 2021-05-22 NOTE — TELEPHONE ENCOUNTER
Patient notified by phone. She is taking 5000 right now till her bottle is empty then shes going back to 2000 daily told her to finish the 5000 and then when she starts the 2000 daily to double it to 4000 daily patient understood and agreed.

## 2021-05-22 NOTE — TELEPHONE ENCOUNTER
NO-- actually I would like the HIGHER dose-- go ahead and continue on the 5000 IU,  the chat had 2000 iu listed. She needs at least the 4000-- 5000 is even better.   Please correct the chart

## 2021-05-22 NOTE — TELEPHONE ENCOUNTER
Please notify the patient that her labs all look okay with the exception of the vitamin D. Her vitamin D level is still a little bit on the low side. Make sure she is taking the 2000 IUs daily. If not then she needs to start. If she is then have her double up to 4000 daily.

## 2021-05-24 NOTE — ASSESSMENT & PLAN NOTE
Remote history continue with risk factor modification. With the hypotension today and the excessive cost of the Bystolic will stop this today and monitor her blood pressure.

## 2021-05-24 NOTE — ASSESSMENT & PLAN NOTE
Continue follow-up with endocrine. No changes in her insulin today will defer this to endocrinology.   Labs are ordered and will send results to endocrinology

## 2021-08-18 ENCOUNTER — OFFICE VISIT (OUTPATIENT)
Dept: FAMILY MEDICINE CLINIC | Age: 86
End: 2021-08-18
Payer: MEDICARE

## 2021-08-18 VITALS
SYSTOLIC BLOOD PRESSURE: 120 MMHG | OXYGEN SATURATION: 98 % | HEIGHT: 64 IN | DIASTOLIC BLOOD PRESSURE: 82 MMHG | RESPIRATION RATE: 20 BRPM | HEART RATE: 78 BPM | WEIGHT: 195 LBS | BODY MASS INDEX: 33.29 KG/M2

## 2021-08-18 DIAGNOSIS — E78.2 MIXED HYPERLIPIDEMIA: ICD-10-CM

## 2021-08-18 DIAGNOSIS — E03.9 ACQUIRED HYPOTHYROIDISM: ICD-10-CM

## 2021-08-18 DIAGNOSIS — Z00.00 ROUTINE GENERAL MEDICAL EXAMINATION AT A HEALTH CARE FACILITY: Primary | ICD-10-CM

## 2021-08-18 DIAGNOSIS — F32.1 CURRENT MODERATE EPISODE OF MAJOR DEPRESSIVE DISORDER WITHOUT PRIOR EPISODE (HCC): ICD-10-CM

## 2021-08-18 DIAGNOSIS — I25.10 ATHEROSCLEROSIS OF NATIVE CORONARY ARTERY OF NATIVE HEART WITHOUT ANGINA PECTORIS: ICD-10-CM

## 2021-08-18 PROCEDURE — G0463 HOSPITAL OUTPT CLINIC VISIT: HCPCS | Performed by: FAMILY MEDICINE

## 2021-08-18 PROCEDURE — 99397 PER PM REEVAL EST PAT 65+ YR: CPT | Performed by: FAMILY MEDICINE

## 2021-08-18 PROCEDURE — 4040F PNEUMOC VAC/ADMIN/RCVD: CPT | Performed by: FAMILY MEDICINE

## 2021-08-18 PROCEDURE — G0439 PPPS, SUBSEQ VISIT: HCPCS | Performed by: FAMILY MEDICINE

## 2021-08-18 PROCEDURE — 1123F ACP DISCUSS/DSCN MKR DOCD: CPT | Performed by: FAMILY MEDICINE

## 2021-08-18 RX ORDER — CARVEDILOL 6.25 MG/1
6.25 TABLET ORAL 2 TIMES DAILY
COMMUNITY
Start: 2021-08-05 | End: 2022-06-13 | Stop reason: ALTCHOICE

## 2021-08-18 RX ORDER — ESOMEPRAZOLE MAGNESIUM 40 MG/1
CAPSULE, DELAYED RELEASE ORAL
COMMUNITY
Start: 2021-07-11

## 2021-08-18 ASSESSMENT — PATIENT HEALTH QUESTIONNAIRE - PHQ9
SUM OF ALL RESPONSES TO PHQ QUESTIONS 1-9: 2
1. LITTLE INTEREST OR PLEASURE IN DOING THINGS: 1
2. FEELING DOWN, DEPRESSED OR HOPELESS: 1
SUM OF ALL RESPONSES TO PHQ QUESTIONS 1-9: 2
SUM OF ALL RESPONSES TO PHQ QUESTIONS 1-9: 2
SUM OF ALL RESPONSES TO PHQ9 QUESTIONS 1 & 2: 2

## 2021-08-18 ASSESSMENT — LIFESTYLE VARIABLES: HOW OFTEN DO YOU HAVE A DRINK CONTAINING ALCOHOL: 0

## 2021-08-18 NOTE — PATIENT INSTRUCTIONS
Personalized Preventive Plan for Carlos Lopez - 8/18/2021  Medicare offers a range of preventive health benefits. Some of the tests and screenings are paid in full while other may be subject to a deductible, co-insurance, and/or copay. Some of these benefits include a comprehensive review of your medical history including lifestyle, illnesses that may run in your family, and various assessments and screenings as appropriate. After reviewing your medical record and screening and assessments performed today your provider may have ordered immunizations, labs, imaging, and/or referrals for you. A list of these orders (if applicable) as well as your Preventive Care list are included within your After Visit Summary for your review. Other Preventive Recommendations:    · A preventive eye exam performed by an eye specialist is recommended every 1-2 years to screen for glaucoma; cataracts, macular degeneration, and other eye disorders. · A preventive dental visit is recommended every 6 months. · Try to get at least 150 minutes of exercise per week or 10,000 steps per day on a pedometer . · Order or download the FREE \"Exercise & Physical Activity: Your Everyday Guide\" from [employer]. Call 6-662.277.3331 or search [employer] online. · You need 5072-3481 mg of calcium and 9439-8702 IU of vitamin D per day. It is possible to meet your calcium requirement with diet alone, but a vitamin D supplement is usually necessary to meet this goal.  · When exposed to the sun, use a sunscreen that protects against both UVA and UVB radiation with an SPF of 30 or greater. Reapply every 2 to 3 hours or after sweating, drying off with a towel, or swimming. · Always wear a seat belt when traveling in a car. Always wear a helmet when riding a bicycle or motorcycle.

## 2021-08-18 NOTE — PROGRESS NOTES
Medicare Annual Wellness Visit  Name: Dudley Malagon Date: 2021   MRN: [id number] Sex: Female   Age: 80 y.o. Ethnicity: Non- / Non    : 1935 Race: White (non-)      Dave Elise is here for Medicare AWV    Screenings for behavioral, psychosocial and functional/safety risks, and cognitive dysfunction are all negative except as indicated below. These results, as well as other patient data from the [address] form, are documented in Flowsheets linked to this Encounter. Has had a good summer. Sometimes mood is down a bit but overall but then is fine again the next day. 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 BS is in the 200 range most of the time. Always right in that area. Endocrine is next month again. Was on the trulicity for awhile but it was so expensive. Has not been on any of these right now. Has had more SOB cassidy with the exertion- Dr. Nolberto Aquino ordered an Echo to  Check this-- tomorrow. Will be seeing her after this. Allergies   Allergen Reactions    Exenatide Other (See Comments)    Glucophage  [Metformin Hcl] Other (See Comments)    Hydromorphone     Invokana [Canagliflozin]      yeast    Lipitor  [Atorvastatin Calcium] Other (See Comments)    Other      novotwist    Glucophage [Metformin Hcl] Nausea And Vomiting    Ketorolac Nausea And Vomiting    Metformin      Other reaction(s): Intolerance-unknown  Other reaction(s): Nausea And Vomiting         Prior to Visit Medications    Medication Sig Taking?  Authorizing Provider   esomeprazole (NEXIUM) 40 MG delayed release capsule  Yes Historical Provider, MD   carvedilol (COREG) 6.25 MG tablet Take 6.25 mg by mouth 2 times daily Yes Historical Provider, MD   DULoxetine (CYMBALTA) 60 MG extended release capsule Take 1 capsule by mouth daily Yes Jessica Faith MD   levothyroxine (SYNTHROID) 75 MCG tablet Take 1 tablet by mouth Daily Yes Jessica Faith MD   oxybutynin (DITROPAN XL) 15 MG extended release tablet TAKE 1 TABLET EVERY DAY Yes Miracle Daniels MD   TRULICITY 9.46 ZL/5.5TI SOPN INJECT 1 SUBCUTANEOUSLY ONCE A WEEK Yes Historical Provider, MD   insulin regular human (HUMULIN R) 500 UNIT/ML concentrated injection vial Inject 60 Units into the skin 3 times daily (before meals) Yes MD OLIVER PritchardAGLAR KWIKPEN 100 UNIT/ML injection pen INJECT 80 UNITS SUBCUTANEOUSLY ONCE DAILY Yes Historical Provider, MD HERRERA-MADAN UF III MINI PEN NEEDLES 31G X 5 MM MISC USE SYRINGE SUBCUTANEOUSLY 4 TIMES DAILY Yes Historical Provider, MD   Respiratory Therapy Supplies BUD 1 Device by Does not apply route daily New CPAP mask and tubing Yes Miracle Daniels MD   Insulin Pen Needle 30G X 5 MM MISC 1 each by Does not apply route 4 times daily Yes Miracle Daniels MD   aspirin 81 MG chewable tablet Take 81 mg by mouth daily. Yes Historical Provider, MD   TRUE METRIX BLOOD GLUCOSE TEST strip TEST four times a day Yes Historical Provider, MD   Multiple Vitamins-Minerals (MULTIVITAMIN ADULT PO) Take 1 tablet by mouth daily Yes Historical Provider, MD   pravastatin (PRAVACHOL) 40 MG tablet Take 1 tablet by mouth daily Yes Historical Provider, MD   b complex vitamins capsule Take 1 capsule by mouth daily Yes Historical Provider, MD   VITAMIN D-3 (COLECALCIFEROL) 400 UNITS TABS Take 2,000 Units by mouth daily.  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)     Type II or unspecified type diabetes mellitus without mention of complication, not stated as uncontrolled     Unspecified sleep apnea        Past Surgical History:   Procedure Laterality Date    APPENDECTOMY      CARDIAC CATHETERIZATION  2010   [facility] CARDIAC SURGERY  04/02/2011    repaired aorta    CARPAL TUNNEL RELEASE      COLONOSCOPY  2010    multiple colon polyps - repeat every 3 years   Radah Stern      cataracts - Dr Sherman Johnston, TOTAL ABDOMINAL      hemorrhaging, complete    THORACIC AORTIC ANEURYSM REPAIR      2011    THYROID SURGERY  1960    gland removed         Family History   Problem Relation Age of Onset    Heart Disease Mother     Heart Disease Father     Breast Cancer Sister         10 years ago     No Known Problems Sister     No Known Problems Sister     No Known Problems Sister        CareTeam (Including outside providers/suppliers regularly involved in providing care):   Patient Care Team:  Jarret Cervantes MD as PCP - General (Family Medicine)  Jarret Cervantes MD as PCP - [facility] Empaneled Provider  Dallas Dia MD as Consulting Physician (Endocrinology)    Wt Readings from Last 3 Encounters:   08/18/21 195 lb (88.5 kg)   05/17/21 200 lb 3.2 oz (90.8 kg)   04/15/21 203 lb (92.1 kg)     Vitals:    08/18/21 1016   BP: 120/82   Site: Left Upper Arm   Position: Sitting   Cuff Size: Large Adult   Pulse: 78   Resp: 20   SpO2: 98%   Weight: 195 lb (88.5 kg)   Height: 5' 4\" (1.626 m)     Body mass index is 33.47 kg/m². Based upon direct observation of the patient, evaluation of cognition reveals recent and remote memory intact. Physical Exam  Vitals and nursing note reviewed. Constitutional:       Appearance: Normal appearance. She is well-developed. HENT:      Head: Normocephalic and atraumatic. Right Ear: External ear normal.      Left Ear: External ear normal.      Nose: Nose normal.      Mouth/Throat:      Pharynx: No oropharyngeal exudate. Eyes:      Conjunctiva/sclera: Conjunctivae normal.   Neck:      Thyroid: No thyromegaly.       Vascular: Carotid bruit (soft on the right carotid) present. No JVD. Cardiovascular:      Rate and Rhythm: Normal rate and regular rhythm. Heart sounds: Normal heart sounds. No murmur heard. No friction rub. No gallop. Pulmonary:      Effort: Pulmonary effort is normal. No respiratory distress. Breath sounds: Normal breath sounds. Abdominal:      Palpations: Abdomen is soft. There is no mass. Tenderness: There is no abdominal tenderness. There is no right CVA tenderness or left CVA tenderness. Musculoskeletal:         General: No tenderness. Cervical back: Normal range of motion and neck supple. Right lower leg: No edema. Left lower leg: No edema. Comments: Has chronic OA deformity changes   Lymphadenopathy:      Cervical: No cervical adenopathy. Skin:     General: Skin is warm. Capillary Refill: Capillary refill takes less than 2 seconds. Neurological:      General: No focal deficit present. Mental Status: She is alert and oriented to person, place, and time. Cranial Nerves: No cranial nerve deficit. Coordination: Coordination normal.   Psychiatric:         Mood and Affect: Mood normal.         Behavior: Behavior normal.         Thought Content: Thought content normal.         Judgment: Judgment normal.         1. Routine general medical examination at a health care facility  2. 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 Mixed hyperlipidemia  4. Atherosclerosis of native coronary artery of native heart without angina pectoris  5. Acquired hypothyroidism  6. Current moderate episode of major depressive disorder without prior episode ([facility] [address]    Continue with the close follow up with endocrine-- encouraged her regular exercise and better dietary adherence. For her lipids -- controlled, continue on the pravastatin at this time.      CAD -- has the follow up scheduled with Dr. Kim Rodas - continue with the close cardiology follow up. Thyroid has been asx an stable at this time. Mood is stable at this time. NO change in her current medications. Patient's complete Health Risk Assessment and screening values have been reviewed and are found in Flowsheets. The following problems were reviewed today and where indicated follow up appointments were made and/or referrals ordered. Positive Risk Factor Screenings with Interventions:     Fall Risk:  Timed Up and Go Test > 12 seconds? (Complete if either Fall Risk answers are Yes): no  2 or more falls in past year?: (!) yes  Fall with injury in past year?: no  Fall Risk Interventions:    · Home safety tips provided          General Health and ACP:  General  In general, how would you say your health is?: Good  In the past 7 days, have you experienced any of the following?  New or Increased Pain, New or Increased Fatigue, Loneliness, Social Isolation, Stress or Anger?: None of These  Do you get the social and emotional support that you need?: Yes  Do you have a Living Will?: Yes  Advance Directives     Power of [address] [place] [address] Will ACP-Advance Directive ACP-Power of     Not on File Not on File Not on File Not on File      General Health Risk Interventions:  · No Living Will: has the living will and will bring this in for us    Health Habits/Nutrition:  Health Habits/Nutrition  Do you exercise for at least 20 minutes 2-3 times per week?: Yes  Have you lost any weight without trying in the past 3 months?: No  Do you eat only one meal per day?: No  Have you seen the dentist within the past year?: Yes  Body mass index: (!) 33.47  Health Habits/Nutrition Interventions:  · continues to work on her exercise and her diet     Safety:  Safety  Do you have working smoke detectors?: Yes  Have all throw rugs been removed or fastened?: (!) No  Do you have non-slip mats or surfaces in all bathtubs/showers?: Yes  Do all of your stairways have a railing or banister?: Yes  Are your doorways, halls and stairs free of clutter?: Yes  Do you always fasten your seatbelt when you are in a car?: Yes  Safety Interventions:  · Home safety tips provided     Personalized Preventive Plan   Current Health Maintenance Status  Immunization History   Administered Date(s) Administered    COVID-19, Moderna, PF, 100mcg/0.5mL 01/11/2021, 02/04/2021    Hepatitis A/Hepatitis B (Twinrix) 06/10/2010, 07/12/2010, 12/15/2010    Influenza A ([id number]) Vaccine PF IM 06/10/2010    Influenza Vaccine, unspecified formulation 10/03/2016    Influenza Whole 10/12/2007, 09/08/2009, 10/08/2010, 10/04/2011    Influenza, High Dose (Fluzone 65 yrs and older) 09/11/2014, 09/12/2015, 10/03/2016, 09/11/2017, 10/12/2018    Influenza, High-dose, Lula Ruiz, 65 yrs +, IM (Fluzone) 10/09/2020    Influenza, Intradermal, Preservative free 10/01/2019    Influenza, Triv, inactivated, subunit, adjuvanted, IM (Fluad 65 yrs and older) 09/11/2017    Pneumococcal Conjugate 13-valent ([id number]) 10/14/2015    Pneumococcal Polysaccharide ([id number]) 04/17/2013, 02/25/2020    Td vaccine (adult) 06/10/2010    Tdap (Boostrix, Adacel) 04/17/2013    Typhoid Vac, Parenteral, Other Than Acetone-killed, Dried 06/10/2010    Typhoid Vi capsular polysaccharide (Typhim VI) 06/10/2010    Zoster Live (Zostavax) 10/04/2017    Zoster Recombinant (Shingrix) 03/07/2018, 05/30/2018        Health Maintenance   Topic Date Due    Annual Wellness Visit (AWV)  Never done    Flu vaccine (1) 09/01/2021    Lipid screen  05/17/2022    TSH testing  05/17/2022    DTaP/Tdap/Td vaccine (2 - Td or Tdap) 04/17/2023    Shingles Vaccine  Completed    Pneumococcal 65+ years Vaccine  Completed    COVID-19 Vaccine  Completed    Hepatitis A vaccine  Aged Out    Hib vaccine  Aged Out    Meningococcal (ACWY) vaccine  Aged Out     Recommendations for Better Finance Due: see orders and patient instructions/AVS.  .   Recommended screening schedule for the next 5-10 years is provided to the patient in written form: see Patient Nancy Shen was seen today for medicare aw.     Diagnoses and all orders for this visit:    Routine general medical examination at a health care facility    Uncontrolled type 2 diabetes mellitus with diabetic polyneuropathy, with long-term current use of insulin ([address]    Mixed hyperlipidemia    Atherosclerosis of native coronary artery of native heart without angina pectoris    Acquired hypothyroidism    Current moderate episode of major depressive disorder without prior episode ([address]

## 2021-08-31 NOTE — TELEPHONE ENCOUNTER
Patient states she needs the Humulin R pen sent [facility] in [place]. Medication pended states Vial not Pen Her mail order is delayed by at least a week, and she dose not have enough till it arrives.

## 2021-09-02 RX ORDER — INSULIN HUMAN 500 [IU]/ML
60 INJECTION, SOLUTION SUBCUTANEOUS
Qty: 20 ML | Refills: 1 | Status: SHIPPED | OUTPATIENT
Start: 2021-09-02 | End: 2022-06-13

## 2021-09-15 DIAGNOSIS — G89.29 CHRONIC PAIN OF RIGHT KNEE: ICD-10-CM

## 2021-09-15 DIAGNOSIS — M25.561 CHRONIC PAIN OF RIGHT KNEE: ICD-10-CM

## 2021-09-15 RX ORDER — GABAPENTIN 300 MG/1
CAPSULE ORAL
Qty: 90 CAPSULE | Refills: 0 | Status: SHIPPED | OUTPATIENT
Start: 2021-09-15 | End: 2022-02-21

## 2022-02-21 ENCOUNTER — OFFICE VISIT (OUTPATIENT)
Dept: FAMILY MEDICINE CLINIC | Age: 87
End: 2022-02-21
Payer: MEDICARE

## 2022-02-21 VITALS
WEIGHT: 184 LBS | HEART RATE: 79 BPM | OXYGEN SATURATION: 98 % | DIASTOLIC BLOOD PRESSURE: 88 MMHG | SYSTOLIC BLOOD PRESSURE: 130 MMHG | BODY MASS INDEX: 31.58 KG/M2

## 2022-02-21 DIAGNOSIS — E66.01 SEVERE OBESITY (BMI 35.0-39.9) WITH COMORBIDITY (HCC): ICD-10-CM

## 2022-02-21 DIAGNOSIS — E55.9 VITAMIN D DEFICIENCY: ICD-10-CM

## 2022-02-21 DIAGNOSIS — E03.9 ACQUIRED HYPOTHYROIDISM: ICD-10-CM

## 2022-02-21 DIAGNOSIS — F32.4 MAJOR DEPRESSIVE DISORDER WITH SINGLE EPISODE, IN PARTIAL REMISSION (HCC): ICD-10-CM

## 2022-02-21 DIAGNOSIS — E78.2 MIXED HYPERLIPIDEMIA: ICD-10-CM

## 2022-02-21 DIAGNOSIS — Z51.81 MEDICATION MONITORING ENCOUNTER: ICD-10-CM

## 2022-02-21 DIAGNOSIS — I25.10 ATHEROSCLEROSIS OF NATIVE CORONARY ARTERY OF NATIVE HEART WITHOUT ANGINA PECTORIS: ICD-10-CM

## 2022-02-21 LAB — HBA1C MFR BLD: 8.4 %

## 2022-02-21 PROCEDURE — 83036 HEMOGLOBIN GLYCOSYLATED A1C: CPT | Performed by: FAMILY MEDICINE

## 2022-02-21 PROCEDURE — 99212 OFFICE O/P EST SF 10 MIN: CPT | Performed by: FAMILY MEDICINE

## 2022-02-21 PROCEDURE — 99214 OFFICE O/P EST MOD 30 MIN: CPT | Performed by: FAMILY MEDICINE

## 2022-02-21 PROCEDURE — 3052F HG A1C>EQUAL 8.0%<EQUAL 9.0%: CPT | Performed by: FAMILY MEDICINE

## 2022-02-21 PROCEDURE — PBSHW POCT GLYCOSYLATED HEMOGLOBIN (HGB A1C): Performed by: FAMILY MEDICINE

## 2022-02-21 RX ORDER — DULAGLUTIDE 1.5 MG/.5ML
3 INJECTION, SOLUTION SUBCUTANEOUS WEEKLY
COMMUNITY

## 2022-02-21 NOTE — PROGRESS NOTES
[facility]  [address]  Dept: [phone]  Dept [facility]:[phone]    Sammy Litten is a 80 y.o. female who presents today for her medical conditions/complaints as notedbelow. Sammy Litten is c/o of Follow-up (6mo) and Diabetes      HPI:     HPI    Sammy Litten is a 80 y.o. female who presents for follow-up of hypertension, diabetes, and hyperlipidemia. She indicates that she is feeling well and denies any symptoms referable to her elevated blood pressure or diabetes. Specifically denies chest pain, palpitations, dyspnea, peripheral edema, thirst, frequent urination, and blurred vision. Current medication regimen is as listed below. She denies any side effects of medication, and has been taking it regularly. Home glucose readings have been pretty-- a lot of lows-- endocrine has a NP now at the endocrine center -- is checking 3-4 times per day. Sat night got really warm through the night. Ate some Peanut butter. Not as much as it was. Is checking before she goes to bed and will eat before she goes to bed if the sugar is lower. Last eye exam was 2020 with[facility]. Diabetic complications includenephropathy, neuropathy and CAD. shehas been exercising regularly mostly walking in the house since she cannot walk outside with the weather. Hasbeen following a diabeticdiet- most of the time. Has lost some weight. Not eating as much. Cannot stand meat. Just cannot tolerate this. Cannot even really tolerate the chicken-- never was a big meat eater,    Done at [place] -- was down to 7 something at that time. Does have some SOB with her activities and when she lays down at night sometimes'-- walking in to the [address] for example-- this is no different than it has always been. Echo 2021 in August was positive for moderate MR-no new wall motion abnormalities noted.   She has not had any significant change in her symptoms in at least last 6 to 12 months        BP Readings from Last 3 Encounters:   02/21/22 130/88   08/18/21 120/82   05/17/21 (!) 114/56          (goal 120/80)    Wt Readings from Last 3 Encounters:   02/21/22 184 lb (83.5 kg)   08/18/21 195 lb (88.5 kg)   05/17/21 200 lb 3.2 oz (90.8 kg)        Past Medical History:   Diagnosis Date    CAD (coronary artery disease)     Depression     Hypertension     Hypothyroidism     Major depressive disorder with single episode, in partial remission ([facility] [address] 10/14/2018    Neuropathy     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)     Type II or unspecified type diabetes mellitus without mention of complication, not stated as uncontrolled     Unspecified sleep apnea       Past Surgical History:   Procedure Laterality Date    APPENDECTOMY      CARDIAC CATHETERIZATION  2010    CARDIAC SURGERY  04/02/2011    repaired aorta    CARPAL TUNNEL RELEASE      COLONOSCOPY  2010    multiple colon polyps - repeat every 3 years   [address]      cataracts - Dr Dana Elliott, TOTAL ABDOMINAL      hemorrhaging, complete    THORACIC AORTIC ANEURYSM REPAIR      2011    THYROID SURGERY  1960    gland removed       Family History   Problem Relation Age of Onset    Heart Disease Mother     Heart Disease Father     Breast Cancer Sister         10 years ago     No Known Problems Sister     No Known Problems Sister     No Known Problems Sister        Social History     Tobacco Use    Smoking status: Never Smoker    Smokeless tobacco: Never Used   Substance Use Topics    Alcohol use: No      Current Outpatient Medications   Medication Sig Dispense Refill    Dulaglutide (TRULICITY) 1.5 KM/0.3OO SOPN 1.5 mg      insulin regular human (HUMULIN R) 500 UNIT/ML concentrated injection vial Inject 60 Units into the skin 3 times daily (before meals) 20 mL 1    esomeprazole (NEXIUM) 40 MG delayed release capsule       carvedilol (COREG) 6.25 MG tablet Take 6.25 mg by mouth 2 times daily      DULoxetine (CYMBALTA) 60 MG extended release capsule Take 1 capsule by mouth daily 90 capsule 3    levothyroxine (SYNTHROID) 75 MCG tablet Take 1 tablet by mouth Daily 90 tablet 3    oxybutynin (DITROPAN XL) 15 MG extended release tablet TAKE 1 TABLET EVERY DAY 90 tablet 3    B-D UF III MINI PEN NEEDLES 31G X 5 MM MISC USE SYRINGE SUBCUTANEOUSLY 4 TIMES DAILY      Respiratory Therapy Supplies BUD 1 Device by Does not apply route daily New CPAP mask and tubing 1 Device 1    Insulin Pen Needle 30G X 5 MM MISC 1 each by Does not apply route 4 times daily 400 each 5    aspirin 81 MG chewable tablet Take 81 mg by mouth daily.  TRUE METRIX BLOOD GLUCOSE TEST strip TEST four times a day  0    Multiple Vitamins-Minerals (MULTIVITAMIN ADULT PO) Take 1 tablet by mouth daily      b complex vitamins capsule Take 1 capsule by mouth daily      VITAMIN D-3 (COLECALCIFEROL) 400 UNITS TABS Take 2,000 Units by mouth daily.  BASAGLAR KWIKPEN 100 UNIT/ML injection pen INJECT 80 UNITS SUBCUTANEOUSLY ONCE DAILY (Patient not taking: Reported on 2/21/2022)      pravastatin (PRAVACHOL) 40 MG tablet Take 1 tablet by mouth daily (Patient not taking: Reported on 2/21/2022)       No current facility-administered medications for this visit. Allergies   Allergen Reactions    Exenatide Other (See Comments)     Other reaction(s): stomach upset    Glucophage  [Metformin Hcl] Other (See Comments)    Hydromorphone     Invokana [Canagliflozin]      yeast    Lipitor  [Atorvastatin Calcium] Other (See Comments)    Other      novotwist    Glucophage [Metformin Hcl] Nausea And Vomiting    Ketorolac Nausea And Vomiting    Metformin      Other reaction(s):  Intolerance-unknown  Other reaction(s): Nausea And Vomiting       Health Maintenance   Topic Date Due    COVID-19 Vaccine (3 - Booster for Moderna series) 07/04/2021  Lipid screen  05/17/2022    TSH testing  05/17/2022    Depression Monitoring  08/18/2022    Annual Wellness Visit (AWV)  08/19/2022    DTaP/Tdap/Td vaccine (2 - Td or Tdap) 04/17/2023    Flu vaccine  Completed    Shingles Vaccine  Completed    Pneumococcal 65+ years Vaccine  Completed    Hepatitis A vaccine  Aged Out    Hib vaccine  Aged Out    Meningococcal (ACWY) vaccine  Aged Out       Subjective:      Review of Systems    Objective:     /88 (Site: Right Upper Arm, Position: Sitting, Cuff Size: Medium Adult)   Pulse 79   Wt 184 lb (83.5 kg)   LMP 04/24/1987 (Approximate)   SpO2 98%   BMI 31.58 kg/m²     Physical Exam  Vitals and nursing note reviewed. Constitutional:       Appearance: Normal appearance. She is well-developed. HENT:      Head: Normocephalic and atraumatic. Right Ear: External ear normal.      Left Ear: External ear normal.      Nose: Nose normal.      Mouth/Throat:      Pharynx: No oropharyngeal exudate. Eyes:      Conjunctiva/sclera: Conjunctivae normal.   Neck:      Thyroid: No thyromegaly. Vascular: Carotid bruit (soft on the right carotid) present. No JVD. Cardiovascular:      Rate and Rhythm: Normal rate and regular rhythm. Heart sounds: Normal heart sounds. No murmur heard. No friction rub. No gallop. Pulmonary:      Effort: Pulmonary effort is normal. No respiratory distress. Breath sounds: Normal breath sounds. Musculoskeletal:         General: No tenderness. Cervical back: Normal range of motion and neck supple. Right lower leg: No edema. Left lower leg: No edema. Comments: Has chronic OA deformity changes   Lymphadenopathy:      Cervical: No cervical adenopathy. Skin:     General: Skin is warm. Capillary Refill: Capillary refill takes less than 2 seconds. Neurological:      General: No focal deficit present. Mental Status: She is alert and oriented to person, place, and time.       Cranial Nerves: No cranial nerve deficit. Coordination: Coordination normal.   Psychiatric:         Mood and Affect: Mood normal.         Behavior: Behavior normal.         Thought Content: Thought content normal.         Judgment: Judgment normal.         Assessment/Plan:     1. Uncontrolled type 2 diabetes mellitus with diabetic polyneuropathy, with long-term current use of insulin (HCC)  -     POCT Hb A1C (glycosylated hemoglobin)  -     Comprehensive Metabolic Panel; Future  2. Atherosclerosis of native coronary artery of native heart without angina pectoris  -     Lipid Panel; Future  -     Comprehensive Metabolic Panel; Future  -     CBC with Auto Differential; Future  3. Acquired hypothyroidism  -     TSH; Future  -     T4, Free; Future  4. Mixed hyperlipidemia  -     Lipid Panel; Future  5. Major depressive disorder with single episode, in partial remission ([facility] [address]  6. Severe obesity (BMI 35.0-39. 9) with comorbidity ([facility]  7. Medication monitoring encounter  -     Vitamin B12; Future  8. Vitamin D deficiency  -     Vitamin D 25 Hydroxy; Future    Is interested in the continuous glucose monitoring -- with alarm on her phone for the low sugars. Has bought the sensor but needs the prescription from endocrine. This would help with the hypoglycemia has a follow-up scheduled with endocrinology in the next several weeks    Labs ordered for 3-4 months. Review this list with Endocrine-- will check these just prior to her next appt. Hypothyroidism is asymptomatic and last check was at goal.  No changes in her current medications    Her cardiac disease is stable at this time no changes but continue with risk factor modification. Lipids also ordered. Encouraged to continue with her regular diet and exercise.     Lab Results   Component Value Date    WBC 7.42 05/22/2019    HGB 12.1 05/22/2019    HCT 38.4 05/22/2019     05/22/2019    CHOL 177 05/17/2021    TRIG 211 (H) 05/17/2021    HDL 53 05/17/2021    ALT 12 05/17/2021    AST 16 05/17/2021     05/17/2021    K 4.1 05/17/2021     05/17/2021    CREATININE 0.47 (L) 05/17/2021    BUN 15 05/17/2021    CO2 30 05/17/2021    TSH 1.29 05/17/2021    LABA1C 8.4 02/21/2022    LABA1C 8.2 11/16/2020    LABA1C 9.0 05/15/2020    LABMICR 199 (H) 05/22/2020       Return in about 4 months (around 6/21/2022). Multiple labs and other testing may have been ordered which may not be completely evident from the above note due to system interface incompatibilities. Patient given educational materials - see patientinstructions. Discussed use, benefit, and side effects of prescribed medications. All patient questions answered. Pt voiced understanding. Reviewed health maintenance. Instructed to continue current medications, diet andexercise. Patient agreed with treatment plan. Follow up as directed.      (Please note that portions of this note were completed with a voice-recognition program. Efforts were made to edit the dictation but occasionally words are mis-transcribed.)    Electronically signed by Arron Robertson MD on 2/27/2022

## 2022-03-24 DIAGNOSIS — N32.81 OVERACTIVE BLADDER: ICD-10-CM

## 2022-03-24 RX ORDER — OXYBUTYNIN CHLORIDE 15 MG/1
TABLET, EXTENDED RELEASE ORAL
Qty: 90 TABLET | Refills: 3 | Status: SHIPPED | OUTPATIENT
Start: 2022-03-24 | End: 2022-07-20 | Stop reason: SDUPTHER

## 2022-03-24 NOTE — TELEPHONE ENCOUNTER
Sammy Litten is requesting a refill on the following medication(s):  Requested Prescriptions     Pending Prescriptions Disp Refills    oxybutynin (DITROPAN XL) 15 MG extended release tablet [Pharmacy Med Name: OXYBUTYNIN CHLORIDE ER 15 MG Tablet Extended Release 24 Hour] 90 tablet 3     Sig: TAKE 1 TABLET EVERY DAY       Last Visit Date (If Applicable):  6/00/1888    Next Visit Date:    6/9/2022

## 2022-03-30 LAB
ALBUMIN/GLOBULIN RATIO: 1.4 G/DL
ALBUMIN: 4 G/DL (ref 3.5–5)
ALP BLD-CCNC: 106 UNITS/L (ref 38–126)
ALT SERPL-CCNC: 10 UNITS/L (ref 4–35)
ANION GAP SERPL CALCULATED.3IONS-SCNC: 6 MMOL/L
AST SERPL-CCNC: 17 UNITS/L (ref 14–36)
BILIRUB SERPL-MCNC: 0.6 MG/DL (ref 0.2–1.3)
BUN BLDV-MCNC: 13 MG/DL (ref 7–17)
CALCIUM SERPL-MCNC: 8.9 MG/DL (ref 8.4–10.2)
CHLORIDE BLD-SCNC: 101 MMOL/L (ref 98–120)
CHOLESTEROL/HDL RATIO: 4.25 RATIO (ref 0–4.5)
CHOLESTEROL: 234 MG/DL (ref 50–200)
CO2: 31 MMOL/L (ref 22–31)
CREAT SERPL-MCNC: 0.5 MG/DL (ref 0.5–1)
CREATININE, RANDOM URINE: 85.3 MG/DL (ref 20–370)
GFR CALCULATED: > 60
GLOBULIN: 2.9 G/DL
GLUCOSE: 213 MG/DL (ref 65–105)
HDLC SERPL-MCNC: 55 MG/DL (ref 36–68)
LDL CHOLESTEROL CALCULATED: 130.2 MG/DL (ref 0–160)
MICROALBUMIN UR-MCNC: 28.2 MG/DL (ref 0–1.7)
MICROALBUMIN/CREAT UR-RTO: 330.59
POTASSIUM SERPL-SCNC: 4.5 MMOL/L (ref 3.6–5)
SODIUM BLD-SCNC: 137 MMOL/L (ref 135–145)
T4 FREE: 1.07 NG/DL (ref 0.78–2.19)
TOTAL PROTEIN, SERUM: 6.9 G/DL (ref 6.3–8.2)
TRIGL SERPL-MCNC: 244 MG/DL (ref 10–250)
TSH SERPL DL<=0.05 MIU/L-ACNC: 0.61 MIU/ML (ref 0.49–4.67)
VITAMIN B-12: > 1000 PG/ML (ref 239–931)
VITAMIN D 25-HYDROXY: 66.3 NG/ML (ref 30–100)
VLDLC SERPL CALC-MCNC: 49 MG/DL (ref 0–50)

## 2022-06-13 ENCOUNTER — OFFICE VISIT (OUTPATIENT)
Dept: FAMILY MEDICINE CLINIC | Age: 87
End: 2022-06-13
Payer: MEDICARE

## 2022-06-13 VITALS
SYSTOLIC BLOOD PRESSURE: 132 MMHG | WEIGHT: 181 LBS | OXYGEN SATURATION: 96 % | DIASTOLIC BLOOD PRESSURE: 68 MMHG | HEART RATE: 93 BPM | BODY MASS INDEX: 31.07 KG/M2

## 2022-06-13 DIAGNOSIS — E03.9 ACQUIRED HYPOTHYROIDISM: ICD-10-CM

## 2022-06-13 DIAGNOSIS — R55 SYNCOPE, UNSPECIFIED SYNCOPE TYPE: ICD-10-CM

## 2022-06-13 DIAGNOSIS — Z91.81 AT HIGH RISK FOR FALLS: ICD-10-CM

## 2022-06-13 DIAGNOSIS — F32.4 MAJOR DEPRESSIVE DISORDER WITH SINGLE EPISODE, IN PARTIAL REMISSION (HCC): ICD-10-CM

## 2022-06-13 LAB — HBA1C MFR BLD: 7.7 %

## 2022-06-13 PROCEDURE — PBSHW POCT GLYCOSYLATED HEMOGLOBIN (HGB A1C): Performed by: FAMILY MEDICINE

## 2022-06-13 PROCEDURE — 83036 HEMOGLOBIN GLYCOSYLATED A1C: CPT | Performed by: FAMILY MEDICINE

## 2022-06-13 PROCEDURE — 99214 OFFICE O/P EST MOD 30 MIN: CPT | Performed by: FAMILY MEDICINE

## 2022-06-13 PROCEDURE — 99213 OFFICE O/P EST LOW 20 MIN: CPT | Performed by: FAMILY MEDICINE

## 2022-06-13 PROCEDURE — 1123F ACP DISCUSS/DSCN MKR DOCD: CPT | Performed by: FAMILY MEDICINE

## 2022-06-13 PROCEDURE — 3051F HG A1C>EQUAL 7.0%<8.0%: CPT | Performed by: FAMILY MEDICINE

## 2022-06-13 RX ORDER — INSULIN HUMAN 500 [IU]/ML
INJECTION, SOLUTION SUBCUTANEOUS
Qty: 20 ML | Refills: 1 | Status: SHIPPED | OUTPATIENT
Start: 2022-06-13

## 2022-06-13 SDOH — ECONOMIC STABILITY: FOOD INSECURITY: WITHIN THE PAST 12 MONTHS, YOU WORRIED THAT YOUR FOOD WOULD RUN OUT BEFORE YOU GOT MONEY TO BUY MORE.: NEVER TRUE

## 2022-06-13 SDOH — ECONOMIC STABILITY: TRANSPORTATION INSECURITY
IN THE PAST 12 MONTHS, HAS LACK OF TRANSPORTATION KEPT YOU FROM MEETINGS, WORK, OR FROM GETTING THINGS NEEDED FOR DAILY LIVING?: NO

## 2022-06-13 SDOH — ECONOMIC STABILITY: TRANSPORTATION INSECURITY
IN THE PAST 12 MONTHS, HAS THE LACK OF TRANSPORTATION KEPT YOU FROM MEDICAL APPOINTMENTS OR FROM GETTING MEDICATIONS?: NO

## 2022-06-13 SDOH — ECONOMIC STABILITY: FOOD INSECURITY: WITHIN THE PAST 12 MONTHS, THE FOOD YOU BOUGHT JUST DIDN'T LAST AND YOU DIDN'T HAVE MONEY TO GET MORE.: NEVER TRUE

## 2022-06-13 ASSESSMENT — PATIENT HEALTH QUESTIONNAIRE - PHQ9
8. MOVING OR SPEAKING SO SLOWLY THAT OTHER PEOPLE COULD HAVE NOTICED. OR THE OPPOSITE, BEING SO FIGETY OR RESTLESS THAT YOU HAVE BEEN MOVING AROUND A LOT MORE THAN USUAL: 0
5. POOR APPETITE OR OVEREATING: 0
3. TROUBLE FALLING OR STAYING ASLEEP: 1
SUM OF ALL RESPONSES TO PHQ QUESTIONS 1-9: 8
1. LITTLE INTEREST OR PLEASURE IN DOING THINGS: 0
SUM OF ALL RESPONSES TO PHQ QUESTIONS 1-9: 7
SUM OF ALL RESPONSES TO PHQ QUESTIONS 1-9: 8
9. THOUGHTS THAT YOU WOULD BE BETTER OFF DEAD, OR OF HURTING YOURSELF: 1
SUM OF ALL RESPONSES TO PHQ9 QUESTIONS 1 & 2: 3
7. TROUBLE CONCENTRATING ON THINGS, SUCH AS READING THE NEWSPAPER OR WATCHING TELEVISION: 0
2. FEELING DOWN, DEPRESSED OR HOPELESS: 3
SUM OF ALL RESPONSES TO PHQ QUESTIONS 1-9: 8
6. FEELING BAD ABOUT YOURSELF - OR THAT YOU ARE A FAILURE OR HAVE LET YOURSELF OR YOUR FAMILY DOWN: 0
10. IF YOU CHECKED OFF ANY PROBLEMS, HOW DIFFICULT HAVE THESE PROBLEMS MADE IT FOR YOU TO DO YOUR WORK, TAKE CARE OF THINGS AT HOME, OR GET ALONG WITH OTHER PEOPLE: 0
4. FEELING TIRED OR HAVING LITTLE ENERGY: 3

## 2022-06-13 ASSESSMENT — COLUMBIA-SUICIDE SEVERITY RATING SCALE - C-SSRS
1. WITHIN THE PAST MONTH, HAVE YOU WISHED YOU WERE DEAD OR WISHED YOU COULD GO TO SLEEP AND NOT WAKE UP?: YES
2. HAVE YOU ACTUALLY HAD ANY THOUGHTS OF KILLING YOURSELF?: NO
6. HAVE YOU EVER DONE ANYTHING, STARTED TO DO ANYTHING, OR PREPARED TO DO ANYTHING TO END YOUR LIFE?: NO

## 2022-06-13 ASSESSMENT — SOCIAL DETERMINANTS OF HEALTH (SDOH): HOW HARD IS IT FOR YOU TO PAY FOR THE VERY BASICS LIKE FOOD, HOUSING, MEDICAL CARE, AND HEATING?: NOT HARD AT ALL

## 2022-06-13 NOTE — PATIENT INSTRUCTIONS
Stop the carvedilol -- see if the dizziness improves with holding this. Make sure you are getting enough fluids and the blood sugars are not too low.

## 2022-06-13 NOTE — PROGRESS NOTES
[address] [facility]  [address]  Dept: [phone]  Dept [facility]:[phone]    Christopher Gann is a 80 y.o. female who presents today for her medical conditions/complaints as notedbelow. Christopher Falling is c/o of Diabetes (4mo follow up)      HPI:     HPI    Still has had several lower blood sugars in the middle of the night-- will actually feel a tap on her shoulder to get up and her sugar will be low-- had her insulin at night dropped to 50 units and this helped. No data recorded  Lost her grandson recently to an accident. This has significantly increased her sadness. Still has a lot of stress with her . He has depression issues and memory issues as well. Will have a lot of falls recently. Just before she falls will get a bit light headed and then when she goes to step her legs have no strength in them. No injuries,  No actual LOC. Has happened when she is sitting sometimes. Often a few times per week when she is standing.  -- really lightheaded. If she is really still and holds on she will be OK-- feels like she is moving a little. Has only been taking the carvedilol in the morning-- thought it was making her go to the bathroom too much at night. BS were fine when these episodes have happened and insulin adjustment has not affected them. Carotid dopplers showed less than 50% stenosis. Echo 2021 in August was positive for moderate MR-no new wall motion abnormalities noted  .          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)     Type II or unspecified type diabetes mellitus without mention of complication, not stated as uncontrolled     Unspecified sleep apnea       Past Surgical History:   Procedure Laterality Date    APPENDECTOMY      CARDIAC CATHETERIZATION  2010    CARDIAC SURGERY  04/02/2011    repaired aorta    CARPAL TUNNEL RELEASE      COLONOSCOPY  2010    multiple colon polyps - repeat every 3 years   [facility]      cataracts - Dr Rich George, TOTAL ABDOMINAL (CERVIX REMOVED)      hemorrhaging, complete    THORACIC AORTIC ANEURYSM REPAIR      2011    THYROID SURGERY  1960    gland removed       Family History   Problem Relation Age of Onset    Heart Disease Mother     Heart Disease Father     Breast Cancer Sister         10 years ago     No Known Problems Sister     No Known Problems Sister     No Known Problems Sister        Social History     Tobacco Use    Smoking status: Never Smoker    Smokeless tobacco: Never Used   Substance Use Topics    Alcohol use: No      Current Outpatient Medications   Medication Sig Dispense Refill    insulin regular human (HUMULIN R) 500 UNIT/ML concentrated injection vial Inject 45 Units into the skin Daily with supper AND 60 Units 2 times daily (with meals). 20 mL 1    oxybutynin (DITROPAN XL) 15 MG extended release tablet TAKE 1 TABLET EVERY DAY 90 tablet 3    Dulaglutide (TRULICITY) 1.5 PS/8.5GJ SOPN once a week       esomeprazole (NEXIUM) 40 MG delayed release capsule       DULoxetine (CYMBALTA) 60 MG extended release capsule Take 1 capsule by mouth daily 90 capsule 3    levothyroxine (SYNTHROID) 75 MCG tablet Take 1 tablet by mouth Daily 90 tablet 3    aspirin 81 MG chewable tablet Take 81 mg by mouth daily.       Multiple Vitamins-Minerals (MULTIVITAMIN ADULT PO) Take 1 tablet by mouth daily      b complex vitamins capsule Take 1 capsule by mouth daily      VITAMIN D-3 (COLECALCIFEROL) 400 UNITS TABS Take 2,000 Units by mouth daily.  B-D UF III MINI PEN NEEDLES 31G X 5 MM MISC USE SYRINGE SUBCUTANEOUSLY 4 TIMES DAILY      Respiratory Therapy Supplies BUD 1 Device by Does not apply route daily New CPAP mask and tubing 1 Device 1    Insulin Pen Needle 30G X 5 MM MISC 1 each by Does not apply route 4 times daily 400 each 5    TRUE METRIX BLOOD GLUCOSE TEST strip TEST four times a day  0    pravastatin (PRAVACHOL) 40 MG tablet Take 1 tablet by mouth daily (Patient not taking: Reported on 2/21/2022)       No current facility-administered medications for this visit. Allergies   Allergen Reactions    Exenatide Other (See Comments)     Other reaction(s): stomach upset    Glucophage  [Metformin Hcl] Other (See Comments)    Hydromorphone     Invokana [Canagliflozin]      yeast    Lipitor  [Atorvastatin Calcium] Other (See Comments)    Other      novotwist    Glucophage [Metformin Hcl] Nausea And Vomiting    Ketorolac Nausea And Vomiting    Metformin      Other reaction(s): Intolerance-unknown  Other reaction(s): Nausea And Vomiting       Health Maintenance   Topic Date Due    COVID-19 Vaccine (3 - Booster for Moderna series) 07/04/2021    Annual Wellness Visit (AWV)  08/19/2022    Lipids  03/30/2023    DTaP/Tdap/Td vaccine (2 - Td or Tdap) 04/17/2023    Depression Monitoring  06/13/2023    Flu vaccine  Completed    Shingles vaccine  Completed    Pneumococcal 65+ years Vaccine  Completed    Hepatitis A vaccine  Aged Out    Hib vaccine  Aged Out    Meningococcal (ACWY) vaccine  Aged Out       Subjective:      Review of Systems    Objective:     /68 (Site: Left Upper Arm, Position: Sitting, Cuff Size: Large Adult)   Pulse 93   Wt 181 lb (82.1 kg)   LMP 04/24/1987 (Approximate)   SpO2 96%   BMI 31.07 kg/m²     Physical Exam  Vitals and nursing note reviewed. Constitutional:       Appearance: Normal appearance. She is well-developed. HENT:      Head: Normocephalic and atraumatic. Right Ear: External ear normal.      Left Ear: External ear normal.      Nose: Nose normal.      Mouth/Throat:      Pharynx: No oropharyngeal exudate. Eyes:      Conjunctiva/sclera: Conjunctivae normal.   Neck:      Thyroid: No thyromegaly. Vascular: Carotid bruit (soft on the right carotid) present. No JVD. Cardiovascular:      Rate and Rhythm: Normal rate and regular rhythm. Heart sounds: Normal heart sounds. No murmur heard. No friction rub. No gallop. Pulmonary:      Effort: Pulmonary effort is normal. No respiratory distress. Breath sounds: Normal breath sounds. Musculoskeletal:         General: No tenderness. Cervical back: Normal range of motion and neck supple. Right lower leg: No edema. Left lower leg: No edema. Comments: Has chronic OA deformity changes   Lymphadenopathy:      Cervical: No cervical adenopathy. Skin:     General: Skin is warm. Capillary Refill: Capillary refill takes less than 2 seconds. Neurological:      General: No focal deficit present. Mental Status: She is alert and oriented to person, place, and time. Cranial Nerves: No cranial nerve deficit. Coordination: Coordination normal.   Psychiatric:         Mood and Affect: Mood normal.         Behavior: Behavior normal.         Thought Content: Thought content normal.         Judgment: Judgment normal.         Assessment/Plan:     1. Uncontrolled type 2 diabetes mellitus with diabetic polyneuropathy, with long-term current use of insulin (HCC)  -     POCT glycosylated hemoglobin (Hb A1C)  -     insulin regular human (HUMULIN R) 500 UNIT/ML concentrated injection vial; Inject 45 Units into the skin Daily with supper AND 60 Units 2 times daily (with meals). , Disp-20 mL, R-1Print  2. At high risk for falls  3. Acquired hypothyroidism  4.  Major depressive disorder with single episode, in partial remission ([facility] [address]  5. Syncope, unspecified syncope type    If this is not improving then would actually consider adding in midodrine and consider a repeat holter. Perhaps a longer event monitor. With the longstanding diabetes and the neuropathy autonomic insufficiency is a major consideration. Patient Instructions   Stop the carvedilol -- see if the dizziness improves with holding this. Make sure you are getting enough fluids and the blood sugars are not too low. CBC ordered with the last set of labs and will get these results. Consider counseling for the current stressors and will encourage  to also get some help with his mood although he appears to lack insight. HAs good home support and does not feel her medications needs adjusted at this time. Encouraged continued involvement in volunteer activities as an outlet for her mood. Lab Results   Component Value Date    WBC 7.42 05/22/2019    HGB 12.1 05/22/2019    HCT 38.4 05/22/2019     05/22/2019    CHOL 234 (H) 03/30/2022    TRIG 244 03/30/2022    HDL 55 03/30/2022    ALT 10 03/30/2022    AST 17 03/30/2022     03/30/2022    K 4.5 03/30/2022     03/30/2022    CREATININE 0.5 03/30/2022    BUN 13 03/30/2022    CO2 31 03/30/2022    TSH 0.61 03/30/2022    LABA1C 7.7 06/13/2022    LABA1C 8.4 02/21/2022    LABA1C 8.2 11/16/2020    LABMICR 28.2 (H) 03/30/2022       Return in about 3 months (around 9/13/2022) for Medication recheck. Multiple labs and other testing may have been ordered which may not be completely evident from the above note due to system interface incompatibilities. Patient given educational materials - see patientinstructions. Discussed use, benefit, and side effects of prescribed medications. All patient questions answered. Pt voiced understanding. Reviewed health maintenance. Instructed to continue current medications, diet andexercise. Patient agreed with treatment plan. Follow up as directed. (Please note that portions of this note were completed with a voice-recognition program. Efforts were made to edit the dictation but occasionally words are mis-transcribed.)    Electronically signed by Sola Barger MD on 6/19/2022   On the basis of positive falls risk screening, assessment and plan is as follows: home safety tips provided.

## 2022-07-19 DIAGNOSIS — N32.81 OVERACTIVE BLADDER: ICD-10-CM

## 2022-07-20 RX ORDER — OXYBUTYNIN CHLORIDE 15 MG/1
TABLET, EXTENDED RELEASE ORAL
Qty: 90 TABLET | Refills: 3 | Status: SHIPPED | OUTPATIENT
Start: 2022-07-20

## 2022-08-01 RX ORDER — DULOXETIN HYDROCHLORIDE 60 MG/1
CAPSULE, DELAYED RELEASE ORAL
Qty: 90 CAPSULE | Refills: 3 | Status: SHIPPED | OUTPATIENT
Start: 2022-08-01

## 2022-08-01 NOTE — TELEPHONE ENCOUNTER
[place] Amanda is requesting a refill on the following medication(s):  Requested Prescriptions     Pending Prescriptions Disp Refills    DULoxetine (CYMBALTA) 60 MG extended release capsule [Pharmacy Med Name: DULoxetine HCl 60 MG Oral Capsule Delayed Release Particles] 90 capsule 3     Sig: Take 1 capsule by mouth once daily       Last Visit Date (If Applicable):  7/62/3772    Next Visit Date:    9/15/2022

## 2022-09-06 ENCOUNTER — OFFICE VISIT (OUTPATIENT)
Dept: FAMILY MEDICINE CLINIC | Age: 87
End: 2022-09-06
Payer: MEDICARE

## 2022-09-06 VITALS
HEIGHT: 64 IN | DIASTOLIC BLOOD PRESSURE: 60 MMHG | WEIGHT: 178 LBS | HEART RATE: 97 BPM | OXYGEN SATURATION: 94 % | SYSTOLIC BLOOD PRESSURE: 118 MMHG | BODY MASS INDEX: 30.39 KG/M2

## 2022-09-06 DIAGNOSIS — Z00.00 MEDICARE ANNUAL WELLNESS VISIT, SUBSEQUENT: Primary | ICD-10-CM

## 2022-09-06 DIAGNOSIS — K21.9 GASTROESOPHAGEAL REFLUX DISEASE WITHOUT ESOPHAGITIS: ICD-10-CM

## 2022-09-06 DIAGNOSIS — Z51.81 MEDICATION MONITORING ENCOUNTER: ICD-10-CM

## 2022-09-06 DIAGNOSIS — E03.9 ACQUIRED HYPOTHYROIDISM: ICD-10-CM

## 2022-09-06 DIAGNOSIS — I25.10 ATHEROSCLEROSIS OF NATIVE CORONARY ARTERY OF NATIVE HEART WITHOUT ANGINA PECTORIS: ICD-10-CM

## 2022-09-06 DIAGNOSIS — F32.4 MAJOR DEPRESSIVE DISORDER WITH SINGLE EPISODE, IN PARTIAL REMISSION (HCC): ICD-10-CM

## 2022-09-06 PROCEDURE — 99214 OFFICE O/P EST MOD 30 MIN: CPT | Performed by: FAMILY MEDICINE

## 2022-09-06 PROCEDURE — 1123F ACP DISCUSS/DSCN MKR DOCD: CPT | Performed by: FAMILY MEDICINE

## 2022-09-06 PROCEDURE — G0439 PPPS, SUBSEQ VISIT: HCPCS | Performed by: FAMILY MEDICINE

## 2022-09-06 RX ORDER — FLASH GLUCOSE SENSOR
KIT MISCELLANEOUS
COMMUNITY
Start: 2022-07-05

## 2022-09-06 ASSESSMENT — PATIENT HEALTH QUESTIONNAIRE - PHQ9
4. FEELING TIRED OR HAVING LITTLE ENERGY: 3
2. FEELING DOWN, DEPRESSED OR HOPELESS: 1
5. POOR APPETITE OR OVEREATING: 3
SUM OF ALL RESPONSES TO PHQ QUESTIONS 1-9: 10
SUM OF ALL RESPONSES TO PHQ QUESTIONS 1-9: 10
7. TROUBLE CONCENTRATING ON THINGS, SUCH AS READING THE NEWSPAPER OR WATCHING TELEVISION: 0
SUM OF ALL RESPONSES TO PHQ QUESTIONS 1-9: 10
1. LITTLE INTEREST OR PLEASURE IN DOING THINGS: 0
3. TROUBLE FALLING OR STAYING ASLEEP: 3
SUM OF ALL RESPONSES TO PHQ9 QUESTIONS 1 & 2: 1
10. IF YOU CHECKED OFF ANY PROBLEMS, HOW DIFFICULT HAVE THESE PROBLEMS MADE IT FOR YOU TO DO YOUR WORK, TAKE CARE OF THINGS AT HOME, OR GET ALONG WITH OTHER PEOPLE: 1
8. MOVING OR SPEAKING SO SLOWLY THAT OTHER PEOPLE COULD HAVE NOTICED. OR THE OPPOSITE, BEING SO FIGETY OR RESTLESS THAT YOU HAVE BEEN MOVING AROUND A LOT MORE THAN USUAL: 0
SUM OF ALL RESPONSES TO PHQ QUESTIONS 1-9: 10
9. THOUGHTS THAT YOU WOULD BE BETTER OFF DEAD, OR OF HURTING YOURSELF: 0
6. FEELING BAD ABOUT YOURSELF - OR THAT YOU ARE A FAILURE OR HAVE LET YOURSELF OR YOUR FAMILY DOWN: 0

## 2022-09-06 ASSESSMENT — LIFESTYLE VARIABLES
HOW MANY STANDARD DRINKS CONTAINING ALCOHOL DO YOU HAVE ON A TYPICAL DAY: PATIENT DOES NOT DRINK
HOW OFTEN DO YOU HAVE A DRINK CONTAINING ALCOHOL: NEVER

## 2022-09-06 NOTE — PROGRESS NOTES
Medicare Annual Wellness Visit    Carmen Payer is here for Medicare AWV, Diabetes (3mo follow up), and Knee Pain (Left knee pain- when she first stands up she feels like it is not going to hold her. )    Assessment & Plan   Medicare annual wellness visit, subsequent  Major depressive disorder with single episode, in partial remission ([facility] [address]  Atherosclerosis of native coronary artery of native heart without angina pectoris  -     Comprehensive Metabolic Panel; Future  -     Lipid Panel; Future  Gastroesophageal reflux disease without esophagitis  Acquired hypothyroidism  -     TSH; Future  -     T4, Free; Future  Medication monitoring encounter  -     Vitamin B12; Future  -     Magnesium; Future    Recommendations for Preventive Services Due: see orders and patient instructions/AVS.    CAD is asymptomatic at this time. Continue with risk factor modification including blood sugar hypertension hyperlipidemia control. GERD is asymptomatic as well. Blood sugar control is much improved with her diabetes controlled with the endocrinologist.    Mood is stable and she is enjoying volunteering at the hospital.  Encouraged to continue this and continue on her current duloxetine dosage. Recommended screening schedule for the next 5-10 years is provided to the patient in written form: see Patient Instructions/AVS.     Return in about 6 months (around 3/6/2023) for Medication recheck. Subjective   The following acute and/or chronic problems were also addressed today:  Since she stopped her carvedilol in June has not had any episodes of the dizziness at all. Feels so much better. The left knee has been more and more painful-- has to stop and get her strength when she first stands up-- then it is better than it was. Has done PT for her knee in the past and has the HEP at home she still does.   Does not have the good balance but the dizzy episodes are OMCPLETELY GONE>     Has been using the free style [employer] and is doing much better than she was. BS are usually below 200 at this time. Mood has been good.  continues to struggle with his memory and mood changes related to his aging process but she is able to handle it well being able to get out to her volunteer activities and the duloxetine has helped. Patient's complete Health Risk Assessment and screening values have been reviewed and are found in Flowsheets. The following problems were reviewed today and where indicated follow up appointments were made and/or referrals ordered.     Positive Risk Factor Screenings with Interventions:    Fall Risk:  Do you feel unsteady or are you worried about falling? : (!) yes  2 or more falls in past year?: (!) yes  Fall with injury in past year?: no   Fall Risk Interventions:    See progress note     Depression:  PHQ-2 Score: 1  PHQ-9 Total Score: 10    Severity:1-4 = minimal depression, 5-9 = mild depression, 10-14 = moderate depression, 15-19 = moderately severe depression, 20-27 = severe depression  Depression Interventions:  See Progress note          General Health and ACP:  General  In general, how would you say your health is?: Very Good  In the past 7 days, have you experienced any of the following: New or Increased Pain, New or Increased Fatigue, Loneliness, Social Isolation, Stress or Anger?: No  Do you get the social and emotional support that you need?: Yes  Do you have a Living Will?: Yes    Advance Directives       Power of  Living Will ACP-Advance Directive ACP-Power of     Not on File Not on File Not on File Not on File        General Health Risk Interventions:  No Living Will: ACP documents already completed- patient asked to provide copy to the office    Health Habits/Nutrition:  Physical Activity: Inactive    Days of Exercise per Week: 0 days    Minutes of Exercise per Session: 0 min     Have you lost any weight without trying in the past 3 months?: (!) Yes  Body mass index: (!) 30.55  Have you seen the dentist within the past year?: Yes  Health Habits/Nutrition Interventions:  See progress note    Hearing/Vision:  Do you or your family notice any trouble with your hearing that hasn't been managed with hearing aids?: (!) Yes  Do you have difficulty driving, watching TV, or doing any of your daily activities because of your eyesight?: No  Have you had an eye exam within the past year?: Appointment is scheduled  No results found. Hearing/Vision Interventions:  Hearing concerns:  patient declines any further evaluation/treatment for hearing issues            Objective   Vitals:    09/06/22 1134   BP: 118/60   Site: Right Upper Arm   Position: Sitting   Cuff Size: Large Adult   Pulse: 97   SpO2: 94%   Weight: 178 lb (80.7 kg)   Height: 5' 4\" (1.626 m)      Body mass index is 30.55 kg/m². Physical Exam  Vitals and nursing note reviewed. Constitutional:       Appearance: Normal appearance. She is well-developed. HENT:      Head: Normocephalic and atraumatic. Right Ear: External ear normal.      Left Ear: External ear normal.      Nose: Nose normal.      Mouth/Throat:      Pharynx: No oropharyngeal exudate. Eyes:      Conjunctiva/sclera: Conjunctivae normal.   Neck:      Thyroid: No thyromegaly. Vascular: Carotid bruit (soft on the right carotid) present. No JVD. Cardiovascular:      Rate and Rhythm: Normal rate and regular rhythm. Heart sounds: Normal heart sounds. No murmur heard. No friction rub. No gallop. Pulmonary:      Effort: Pulmonary effort is normal. No respiratory distress. Breath sounds: Normal breath sounds. Musculoskeletal:         General: No tenderness. Cervical back: Normal range of motion and neck supple. Right lower leg: No edema. Left lower leg: No edema. Comments: Has chronic OA deformity changes   Lymphadenopathy:      Cervical: No cervical adenopathy. Skin:     General: Skin is warm.       Capillary Refill: Capillary refill takes less than 2 seconds. Neurological:      General: No focal deficit present. Mental Status: She is alert and oriented to person, place, and time. Cranial Nerves: No cranial nerve deficit. Coordination: Coordination normal.   Psychiatric:         Mood and Affect: Mood normal.         Behavior: Behavior normal.         Thought Content: Thought content normal.         Judgment: Judgment normal.            Allergies   Allergen Reactions    Exenatide Other (See Comments)     Other reaction(s): stomach upset    Glucophage  [Metformin Hcl] Other (See Comments)    Hydromorphone     Invokana [Canagliflozin]      yeast    Lipitor  [Atorvastatin Calcium] Other (See Comments)    Other      novotwist    Glucophage [Metformin Hcl] Nausea And Vomiting    Ketorolac Nausea And Vomiting    Metformin      Other reaction(s): Intolerance-unknown  Other reaction(s): Nausea And Vomiting     Prior to Visit Medications    Medication Sig Taking? Authorizing Provider   DULoxetine (CYMBALTA) 60 MG extended release capsule Take 1 capsule by mouth once daily Yes Med Hernadez MD   oxybutynin (DITROPAN XL) 15 MG extended release tablet TAKE 1 TABLET EVERY DAY Yes Med Hernadez MD   insulin regular human (HUMULIN R) 500 UNIT/ML concentrated injection vial Inject 45 Units into the skin Daily with supper AND 60 Units 2 times daily (with meals). Patient taking differently: Inject 50 Units into the skin Daily with supper AND 70 Units 2 times daily (with meals). Yes Med Hernadez MD   Dulaglutide (TRULICITY) 1.5 CT/2.0XD SOPN once a week  Yes Historical Provider, MD   esomeprazole (NEXIUM) 40 MG delayed release capsule  Yes Historical Provider, MD   levothyroxine (SYNTHROID) 75 MCG tablet Take 1 tablet by mouth Daily Yes Med Hernadez MD   aspirin 81 MG chewable tablet Take 81 mg by mouth daily.  Yes Historical Provider, MD   Multiple Vitamins-Minerals (MULTIVITAMIN ADULT PO) Take 1 tablet by mouth daily Yes Historical Provider, MD   b complex vitamins capsule Take 1 capsule by mouth daily Yes Historical Provider, MD   VITAMIN D-3 (COLECALCIFEROL) 400 UNITS TABS Take 2,000 Units by mouth daily.  Yes Historical Provider, MD   Continuous Blood Gluc Sensor (FREESTYLE KATIE 14 DAY SENSOR) MISC USE AS DIRECTED 4 TIMES DAILY  Historical ProviderMD LOPEZ UF III MINI PEN NEEDLES 31G X 5 MM MISC USE SYRINGE SUBCUTANEOUSLY 4 TIMES DAILY  Historical Provider, MD   Respiratory Therapy Supplies BUD 1 Device by Does not apply route daily New CPAP mask and tubing  Bill Beard MD   Insulin Pen Needle 30G X 5 MM MISC 1 each by Does not apply route 4 times daily  Bill Beard MD   TRUE METRIX BLOOD GLUCOSE TEST strip TEST four times a day  Historical Provider, MD [place] (Including outside providers/suppliers regularly involved in providing care):   Patient Care Team:  Bill Beard MD as PCP - General (Family Medicine)  Bill eBard MD as PCP - [facility] Empaneled Provider  Diego Silveira MD as Consulting Physician (Endocrinology)     Reviewed and updated this visit:  Tobacco  Allergies  Meds  Problems  Med Hx  Surg Hx  Soc Hx  Fam Hx

## 2022-09-06 NOTE — PATIENT INSTRUCTIONS
Personalized Preventive Plan for Sofi Alvarado - 9/6/2022  Medicare offers a range of preventive health benefits. Some of the tests and screenings are paid in full while other may be subject to a deductible, co-insurance, and/or copay. Some of these benefits include a comprehensive review of your medical history including lifestyle, illnesses that may run in your family, and various assessments and screenings as appropriate. After reviewing your medical record and screening and assessments performed today your provider may have ordered immunizations, labs, imaging, and/or referrals for you. A list of these orders (if applicable) as well as your Preventive Care list are included within your After Visit Summary for your review. Other Preventive Recommendations:    A preventive eye exam performed by an eye specialist is recommended every 1-2 years to screen for glaucoma; cataracts, macular degeneration, and other eye disorders. A preventive dental visit is recommended every 6 months. Try to get at least 150 minutes of exercise per week or 10,000 steps per day on a pedometer . Order or download the FREE \"Exercise & Physical Activity: Your Everyday Guide\" from [employer]. Call 5-584.346.8956 or search [employer] online. You need 4341-5428 mg of calcium and 6271-3571 IU of vitamin D per day. It is possible to meet your calcium requirement with diet alone, but a vitamin D supplement is usually necessary to meet this goal.  When exposed to the sun, use a sunscreen that protects against both UVA and UVB radiation with an SPF of 30 or greater. Reapply every 2 to 3 hours or after sweating, drying off with a towel, or swimming. Always wear a seat belt when traveling in a car. Always wear a helmet when riding a bicycle or motorcycle.

## 2022-09-15 DIAGNOSIS — E03.9 ACQUIRED HYPOTHYROIDISM: ICD-10-CM

## 2022-09-15 RX ORDER — LEVOTHYROXINE SODIUM 0.07 MG/1
TABLET ORAL
Qty: 90 TABLET | Refills: 3 | Status: SHIPPED | OUTPATIENT
Start: 2022-09-15

## 2022-09-15 NOTE — TELEPHONE ENCOUNTER
Sabrina Hayes is requesting a refill on the following medication(s):  Requested Prescriptions     Pending Prescriptions Disp Refills    levothyroxine (SYNTHROID) 75 MCG tablet [Pharmacy Med Name: Levothyroxine Sodium 75 MCG Oral Tablet] 90 tablet 3     Sig: Take 1 tablet by mouth once daily       Last Visit Date (If Applicable):  3/3/1420    Next Visit Date:    3/6/2023

## 2022-10-14 ENCOUNTER — PROCEDURE VISIT (OUTPATIENT)
Dept: FAMILY MEDICINE CLINIC | Age: 87
End: 2022-10-14
Payer: MEDICARE

## 2022-10-14 VITALS
BODY MASS INDEX: 30.21 KG/M2 | OXYGEN SATURATION: 95 % | WEIGHT: 176 LBS | DIASTOLIC BLOOD PRESSURE: 68 MMHG | HEART RATE: 82 BPM | SYSTOLIC BLOOD PRESSURE: 124 MMHG | RESPIRATION RATE: 16 BRPM

## 2022-10-14 DIAGNOSIS — M17.12 ARTHRITIS OF LEFT KNEE: Primary | ICD-10-CM

## 2022-10-14 DIAGNOSIS — E11.65 UNCONTROLLED TYPE 2 DIABETES MELLITUS WITH HYPERGLYCEMIA (HCC): ICD-10-CM

## 2022-10-14 LAB — HBA1C MFR BLD: 8.6 %

## 2022-10-14 PROCEDURE — 99213 OFFICE O/P EST LOW 20 MIN: CPT | Performed by: FAMILY MEDICINE

## 2022-10-14 PROCEDURE — 3052F HG A1C>EQUAL 8.0%<EQUAL 9.0%: CPT | Performed by: FAMILY MEDICINE

## 2022-10-14 PROCEDURE — 20610 DRAIN/INJ JOINT/BURSA W/O US: CPT | Performed by: FAMILY MEDICINE

## 2022-10-14 PROCEDURE — PBSHW POCT GLYCOSYLATED HEMOGLOBIN (HGB A1C): Performed by: FAMILY MEDICINE

## 2022-10-14 PROCEDURE — 83036 HEMOGLOBIN GLYCOSYLATED A1C: CPT | Performed by: FAMILY MEDICINE

## 2022-10-14 PROCEDURE — 1123F ACP DISCUSS/DSCN MKR DOCD: CPT | Performed by: FAMILY MEDICINE

## 2022-10-14 RX ORDER — BUPIVACAINE HYDROCHLORIDE 5 MG/ML
1 INJECTION, SOLUTION PERINEURAL ONCE
Status: COMPLETED | OUTPATIENT
Start: 2022-10-14 | End: 2022-10-14

## 2022-10-14 RX ORDER — GABAPENTIN 100 MG/1
CAPSULE ORAL
COMMUNITY
Start: 2022-09-16

## 2022-10-14 RX ORDER — LIDOCAINE HYDROCHLORIDE 10 MG/ML
1 INJECTION, SOLUTION INFILTRATION; PERINEURAL ONCE
Status: COMPLETED | OUTPATIENT
Start: 2022-10-14 | End: 2022-10-14

## 2022-10-14 RX ORDER — TRIAMCINOLONE ACETONIDE 40 MG/ML
40 INJECTION, SUSPENSION INTRA-ARTICULAR; INTRAMUSCULAR ONCE
Status: COMPLETED | OUTPATIENT
Start: 2022-10-14 | End: 2022-10-14

## 2022-10-14 RX ORDER — NEBIVOLOL 5 MG/1
TABLET ORAL
COMMUNITY
Start: 2022-09-16

## 2022-10-14 RX ORDER — BUPIVACAINE HYDROCHLORIDE AND EPINEPHRINE 5; 5 MG/ML; UG/ML
INJECTION, SOLUTION PERINEURAL ONCE
Status: CANCELLED | OUTPATIENT
Start: 2022-10-14 | End: 2022-10-14

## 2022-10-14 RX ORDER — PRAVASTATIN SODIUM 40 MG
TABLET ORAL
COMMUNITY
Start: 2022-09-16

## 2022-10-14 RX ADMIN — BUPIVACAINE HYDROCHLORIDE 5 MG: 5 INJECTION, SOLUTION PERINEURAL at 09:46

## 2022-10-14 RX ADMIN — TRIAMCINOLONE ACETONIDE 40 MG: 40 INJECTION, SUSPENSION INTRA-ARTICULAR; INTRAMUSCULAR at 09:48

## 2022-10-14 RX ADMIN — LIDOCAINE HYDROCHLORIDE 1 ML: 10 INJECTION, SOLUTION INFILTRATION; PERINEURAL at 09:47

## 2022-10-14 NOTE — PROGRESS NOTES
[facility]  [facility], [address] [place] [address]  Dept: [phone]  Dept SUQ:[phone]    Ora Bolaños is a 80 y.o. female who presents today for her medical conditions/complaints as notedbelow. Ora Bolaños is c/o of Injections (Here for a Left knee injection)        Assessment/Plan:     1. Arthritis of left knee  -     lidocaine 1 % injection 1 mL; 1 mL, Intra-artICUlar, ONCE, 1 dose, On Fri 10/14/22 at 1015  -     triamcinolone acetonide (KENALOG-40) injection 40 mg; 40 mg, Intra-artICUlar, ONCE, 1 dose, On Fri 10/14/22 at 1015  -     bupivacaine (MARCAINE) 0.5 % injection 5 mg; 5 mg (1 mL), Intra-artICUlar, ONCE, 1 dose, On Fri 10/14/22 at 1015  2. Uncontrolled type 2 diabetes mellitus with hyperglycemia (HCC)  -     POCT Hb A1C (glycosylated hemoglobin)    Watch the blood sugars closely over the next several days with the steroid injection may cause an increase in the blood sugars. Can take additional sliding scale insulin if needed to cover. Avoid excessive had knee use over the next 24 hours. Signs and symptoms of infection reviewed. May repeat in 3 months if beneficial.  If having repeated knee weakness would consider referral to physical therapy again. Lab Results   Component Value Date    WBC 8.7 09/21/2022    HGB 14.6 09/21/2022    HCT 48.6 (H) 09/21/2022    .7 09/21/2022    CHOL 191 09/21/2022    TRIG 127 09/21/2022    HDL 57 09/21/2022    ALT 15 09/21/2022    AST 17 09/21/2022     09/21/2022    K 4.2 09/21/2022     09/21/2022    CREATININE 0.6 09/21/2022    BUN 18 (H) 09/21/2022    CO2 30 09/21/2022    TSH 0.91 09/21/2022    LABA1C 8.6 10/14/2022    LABA1C 7.7 06/13/2022    LABA1C 8.4 02/21/2022    LABMICR 14.7 (H) 09/21/2022       No follow-ups on file. Subjective:      HPI:     HPI    Christy Mcgarry has had ongoing left knee pain. Is using her cane.   Has tried the tylenol as well as the knee brace without relief. Doing some home exercise without relief. Would like to try an injection to help with the pain and aching. No known injury.     BP Readings from Last 3 Encounters:   10/14/22 124/68   09/06/22 118/60   06/13/22 132/68          (goal 120/80)    Wt Readings from Last 3 Encounters:   10/14/22 176 lb (79.8 kg)   09/06/22 178 lb (80.7 kg)   06/13/22 181 lb (82.1 kg)        Past Medical History:   Diagnosis Date    CAD (coronary artery disease)     Depression     Hypertension     Hypothyroidism     Major depressive disorder with single episode, in partial remission ([facility] [address] 10/14/2018    Neuropathy     Neuropathy     Shingles 05/2017    Sleep apnea     CPAP    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 aorta    CARPAL TUNNEL RELEASE      COLONOSCOPY  2010    multiple colon polyps - repeat every 3 years    EYE SURGERY      cataracts - Dr John Barker, TOTAL ABDOMINAL (CERVIX REMOVED)      hemorrhaging, complete    THORACIC AORTIC ANEURYSM REPAIR      2011    THYROID SURGERY  1960    gland removed       Family History   Problem Relation Age of Onset    Heart Disease Mother     Heart Disease Father     Breast Cancer Sister         10 years ago     No Known Problems Sister     No Known Problems Sister     No Known Problems Sister        Social History     Tobacco Use    Smoking status: Never    Smokeless tobacco: Never   Substance Use Topics    Alcohol use: No      Current Outpatient Medications   Medication Sig Dispense Refill    gabapentin (NEURONTIN) 100 MG capsule TAKE 1 TO 3 CAPSULES BY MOUTH AT BEDTIME      nebivolol (BYSTOLIC) 5 MG tablet TAKE 1/2 (ONE-HALF) TABLET BY MOUTH ONCE DAILY pravastatin (PRAVACHOL) 40 MG tablet TAKE 1 TABLET BY MOUTH ONCE DAILY      levothyroxine (SYNTHROID) 75 MCG tablet Take 1 tablet by mouth once daily 90 tablet 3    Continuous Blood Gluc Sensor (FREESTYLE KATIE 14 DAY SENSOR) MISC USE AS DIRECTED 4 TIMES DAILY      DULoxetine (CYMBALTA) 60 MG extended release capsule Take 1 capsule by mouth once daily 90 capsule 3    oxybutynin (DITROPAN XL) 15 MG extended release tablet TAKE 1 TABLET EVERY DAY 90 tablet 3    insulin regular human (HUMULIN R) 500 UNIT/ML concentrated injection vial Inject 45 Units into the skin Daily with supper AND 60 Units 2 times daily (with meals). (Patient taking differently: Inject 50 Units into the skin Daily with supper AND 70 Units 2 times daily (with meals). ) 20 mL 1    Dulaglutide (TRULICITY) 1.5 GE/0.0JD SOPN Inject 3 mg into the skin once a week      esomeprazole (NEXIUM) 40 MG delayed release capsule       B-D UF III MINI PEN NEEDLES 31G X 5 MM MISC USE SYRINGE SUBCUTANEOUSLY 4 TIMES DAILY      Respiratory Therapy Supplies BUD 1 Device by Does not apply route daily New CPAP mask and tubing 1 Device 1    Insulin Pen Needle 30G X 5 MM MISC 1 each by Does not apply route 4 times daily 400 each 5    aspirin 81 MG chewable tablet Take 81 mg by mouth daily. TRUE METRIX BLOOD GLUCOSE TEST strip TEST four times a day  0    Multiple Vitamins-Minerals (MULTIVITAMIN ADULT PO) Take 1 tablet by mouth daily      b complex vitamins capsule Take 1 capsule by mouth daily      VITAMIN D-3 (COLECALCIFEROL) 400 UNITS TABS Take 2,000 Units by mouth daily. No current facility-administered medications for this visit.      Allergies   Allergen Reactions    Exenatide Other (See Comments)     Other reaction(s): stomach upset    Glucophage  [Metformin Hcl] Other (See Comments)    Hydromorphone     Invokana [Canagliflozin]      yeast    Lipitor  [Atorvastatin Calcium] Other (See Comments)    Other      novotwist    Glucophage [Metformin Hcl] Nausea And Vomiting    Ketorolac Nausea And Vomiting    Metformin      Other reaction(s): Intolerance-unknown  Other reaction(s): Nausea And Vomiting       Health Maintenance   Topic Date Due    Flu vaccine (1) 10/14/2023 (Originally 8/1/2022)    COVID-19 Vaccine (3 - Booster for Moderna series) 10/14/2023 (Originally 7/4/2021)    DTaP/Tdap/Td vaccine (2 - Td or Tdap) 04/17/2023    Depression Monitoring  09/06/2023    Annual Wellness Visit (AWV)  09/07/2023    Lipids  09/21/2023    Shingles vaccine  Completed    Pneumococcal 65+ years Vaccine  Completed    Hepatitis A vaccine  Aged Out    Hib vaccine  Aged Out    Meningococcal (ACWY) vaccine  Aged Out         Review of Systems    Objective:     /68 (Site: Left Upper Arm, Position: Sitting, Cuff Size: Medium Adult)   Pulse 82   Resp 16   Wt 176 lb (79.8 kg)   LMP 04/24/1987 (Approximate)   SpO2 95%   BMI 30.21 kg/m²     Physical Exam  Vitals and nursing note reviewed. Constitutional:       Appearance: Normal appearance. Musculoskeletal:         General: Deformity present. No tenderness. Right lower leg: No edema. Left lower leg: No edema. Comments: No warmth erythema. Slightly decreased extension on the left knee. No instability. No focal tenderness. Neurological:      Mental Status: She is alert. Procedure Note    PREOP DIAGNOSIS:  [] Right [x]  Left    [] Bilateral Knee Pain    POSTOP DIAGNOSIS:  [] Right [x]  Left    [] Bilateral Knee Pain    OPERATION:  [] Right []  Left    [x] Bilateral INTRA-ARTICULAR KNEE INJECTION      PROCEDURE:  After sterile prep with betadine and alcohol, an Medial approach was used. [x]40 mg Kenalog  [x]  1 cc 1% lidocaine without Epi       []  12 mg of Celestone       [x]  1 0.5% cc Marcaine     Injected intra-articularly without incident. Pre- and post neurovascular status verified.  No complications note        Multiple labs and other testing may have been ordered which may not be completely evident from the above note due to system interface incompatibilities. Patient given educational materials - see patientinstructions. Discussed use, benefit, and side effects of prescribed medications. All patient questions answered. Pt voiced understanding. Reviewed health maintenance. Instructed to continue current medications, diet andexercise. Patient agreed with treatment plan. Follow up as directed.      (Please note that portions of this note were completed with a voice-recognition program. Efforts were made to edit the dictation but occasionally words are mis-transcribed.)    Electronically signed by Katerine Houser MD on 10/14/2022

## 2022-11-17 ENCOUNTER — OFFICE VISIT (OUTPATIENT)
Dept: FAMILY MEDICINE CLINIC | Age: 87
End: 2022-11-17
Payer: MEDICARE

## 2022-11-17 VITALS
BODY MASS INDEX: 31.41 KG/M2 | WEIGHT: 183 LBS | SYSTOLIC BLOOD PRESSURE: 124 MMHG | OXYGEN SATURATION: 98 % | HEART RATE: 76 BPM | DIASTOLIC BLOOD PRESSURE: 76 MMHG

## 2022-11-17 DIAGNOSIS — H60.312 ACUTE DIFFUSE OTITIS EXTERNA OF LEFT EAR: ICD-10-CM

## 2022-11-17 PROCEDURE — 99213 OFFICE O/P EST LOW 20 MIN: CPT | Performed by: FAMILY MEDICINE

## 2022-11-17 PROCEDURE — 1123F ACP DISCUSS/DSCN MKR DOCD: CPT | Performed by: FAMILY MEDICINE

## 2022-11-17 ASSESSMENT — ENCOUNTER SYMPTOMS
RHINORRHEA: 0
CHOKING: 0

## 2022-11-17 NOTE — PROGRESS NOTES
Ora Bolaños (:  1935) is a 80 y.o. female,Established patient, here for evaluation of the following chief complaint(s):  Ear Drainage (Went to ear  And he told her she had an ear infection to her left, no pain. )         ASSESSMENT/PLAN:  1. Acute diffuse otitis externa of left ear  Comments:  Avoid water in ear. Use ear drops x 5 days. Reinsert hearing aid in 1 week. Return if symptoms worsen or fail to improve. Subjective   SUBJECTIVE/OBJECTIVE:  Patient seen by audiologist 2 days ago and noted left ear canal with white drainage. Patient [place] with L>R; used hearing aids for years. Denies any Sx's such as fever, pain, or drainage. Feels at baseline. Denies any new problems. Review of Systems   Constitutional:  Negative for fever. HENT:  Positive for hearing loss. Negative for congestion, ear discharge, ear pain and rhinorrhea. Respiratory:  Negative for choking. Skin:  Negative for rash. Hematological:  Negative for adenopathy. Objective   Physical Exam  Constitutional:       General: She is not in acute distress. HENT:      Head: Normocephalic. Left Ear: Drainage and swelling present. Ears:      Comments: White moist debris in left canal. Small portion of TM seen normal. Canal narrowed. Eyes:      Extraocular Movements: Extraocular movements intact. Pupils: Pupils are equal, round, and reactive to light. Cardiovascular:      Rate and Rhythm: Normal rate and regular rhythm. Pulmonary:      Effort: Pulmonary effort is normal.      Breath sounds: Normal breath sounds. Musculoskeletal:         General: No swelling. Cervical back: Neck supple. Lymphadenopathy:      Cervical: No cervical adenopathy. Neurological:      General: No focal deficit present. Mental Status: She is alert and oriented to person, place, and time.    Psychiatric:         Mood and Affect: Mood normal.         Behavior: Behavior normal.              An electronic signature was used to authenticate this note.     --Loco Montenegro MD

## 2023-03-06 ENCOUNTER — OFFICE VISIT (OUTPATIENT)
Dept: FAMILY MEDICINE CLINIC | Age: 88
End: 2023-03-06
Payer: MEDICARE

## 2023-03-06 VITALS
BODY MASS INDEX: 31.58 KG/M2 | HEART RATE: 96 BPM | SYSTOLIC BLOOD PRESSURE: 112 MMHG | DIASTOLIC BLOOD PRESSURE: 70 MMHG | OXYGEN SATURATION: 97 % | WEIGHT: 184 LBS

## 2023-03-06 DIAGNOSIS — E03.9 ACQUIRED HYPOTHYROIDISM: ICD-10-CM

## 2023-03-06 DIAGNOSIS — E11.65 UNCONTROLLED TYPE 2 DIABETES MELLITUS WITH HYPERGLYCEMIA (HCC): Primary | ICD-10-CM

## 2023-03-06 DIAGNOSIS — F32.4 MAJOR DEPRESSIVE DISORDER WITH SINGLE EPISODE, IN PARTIAL REMISSION (HCC): ICD-10-CM

## 2023-03-06 DIAGNOSIS — M17.12 ARTHRITIS OF LEFT KNEE: ICD-10-CM

## 2023-03-06 DIAGNOSIS — D18.02 BRAIN HEMANGIOMA (HCC): ICD-10-CM

## 2023-03-06 DIAGNOSIS — E78.2 MIXED HYPERLIPIDEMIA: ICD-10-CM

## 2023-03-06 PROCEDURE — 99213 OFFICE O/P EST LOW 20 MIN: CPT | Performed by: FAMILY MEDICINE

## 2023-03-06 PROCEDURE — 20610 DRAIN/INJ JOINT/BURSA W/O US: CPT | Performed by: FAMILY MEDICINE

## 2023-03-06 RX ORDER — BUPIVACAINE HYDROCHLORIDE 5 MG/ML
1 INJECTION, SOLUTION PERINEURAL ONCE
Status: COMPLETED | OUTPATIENT
Start: 2023-03-06 | End: 2023-03-06

## 2023-03-06 RX ORDER — TRIAMCINOLONE ACETONIDE 40 MG/ML
40 INJECTION, SUSPENSION INTRA-ARTICULAR; INTRAMUSCULAR ONCE
Status: COMPLETED | OUTPATIENT
Start: 2023-03-06 | End: 2023-03-06

## 2023-03-06 RX ORDER — LIDOCAINE HYDROCHLORIDE AND EPINEPHRINE 20; 5 MG/ML; UG/ML
1 INJECTION, SOLUTION EPIDURAL; INFILTRATION; INTRACAUDAL; PERINEURAL ONCE
Status: COMPLETED | OUTPATIENT
Start: 2023-03-06 | End: 2023-03-06

## 2023-03-06 RX ORDER — LIDOCAINE HYDROCHLORIDE 10 MG/ML
1 INJECTION, SOLUTION INFILTRATION; PERINEURAL ONCE
Status: DISCONTINUED | OUTPATIENT
Start: 2023-03-06 | End: 2023-03-06

## 2023-03-06 RX ADMIN — BUPIVACAINE HYDROCHLORIDE 5 MG: 5 INJECTION, SOLUTION PERINEURAL at 11:47

## 2023-03-06 RX ADMIN — LIDOCAINE HYDROCHLORIDE AND EPINEPHRINE 1 ML: 20; 5 INJECTION, SOLUTION EPIDURAL; INFILTRATION; INTRACAUDAL; PERINEURAL at 11:51

## 2023-03-06 RX ADMIN — TRIAMCINOLONE ACETONIDE 40 MG: 40 INJECTION, SUSPENSION INTRA-ARTICULAR; INTRAMUSCULAR at 11:51

## 2023-03-06 SDOH — ECONOMIC STABILITY: FOOD INSECURITY: WITHIN THE PAST 12 MONTHS, YOU WORRIED THAT YOUR FOOD WOULD RUN OUT BEFORE YOU GOT MONEY TO BUY MORE.: NEVER TRUE

## 2023-03-06 SDOH — ECONOMIC STABILITY: INCOME INSECURITY: HOW HARD IS IT FOR YOU TO PAY FOR THE VERY BASICS LIKE FOOD, HOUSING, MEDICAL CARE, AND HEATING?: NOT HARD AT ALL

## 2023-03-06 SDOH — ECONOMIC STABILITY: HOUSING INSECURITY
IN THE LAST 12 MONTHS, WAS THERE A TIME WHEN YOU DID NOT HAVE A STEADY PLACE TO SLEEP OR SLEPT IN A SHELTER (INCLUDING NOW)?: NO

## 2023-03-06 SDOH — ECONOMIC STABILITY: FOOD INSECURITY: WITHIN THE PAST 12 MONTHS, THE FOOD YOU BOUGHT JUST DIDN'T LAST AND YOU DIDN'T HAVE MONEY TO GET MORE.: NEVER TRUE

## 2023-03-06 ASSESSMENT — PATIENT HEALTH QUESTIONNAIRE - PHQ9
5. POOR APPETITE OR OVEREATING: 0
SUM OF ALL RESPONSES TO PHQ QUESTIONS 1-9: 4
SUM OF ALL RESPONSES TO PHQ QUESTIONS 1-9: 4
9. THOUGHTS THAT YOU WOULD BE BETTER OFF DEAD, OR OF HURTING YOURSELF: 0
3. TROUBLE FALLING OR STAYING ASLEEP: 0
SUM OF ALL RESPONSES TO PHQ QUESTIONS 1-9: 4
1. LITTLE INTEREST OR PLEASURE IN DOING THINGS: 0
8. MOVING OR SPEAKING SO SLOWLY THAT OTHER PEOPLE COULD HAVE NOTICED. OR THE OPPOSITE, BEING SO FIGETY OR RESTLESS THAT YOU HAVE BEEN MOVING AROUND A LOT MORE THAN USUAL: 0
2. FEELING DOWN, DEPRESSED OR HOPELESS: 1
4. FEELING TIRED OR HAVING LITTLE ENERGY: 3
SUM OF ALL RESPONSES TO PHQ QUESTIONS 1-9: 4
10. IF YOU CHECKED OFF ANY PROBLEMS, HOW DIFFICULT HAVE THESE PROBLEMS MADE IT FOR YOU TO DO YOUR WORK, TAKE CARE OF THINGS AT HOME, OR GET ALONG WITH OTHER PEOPLE: 1
7. TROUBLE CONCENTRATING ON THINGS, SUCH AS READING THE NEWSPAPER OR WATCHING TELEVISION: 0
SUM OF ALL RESPONSES TO PHQ9 QUESTIONS 1 & 2: 1
6. FEELING BAD ABOUT YOURSELF - OR THAT YOU ARE A FAILURE OR HAVE LET YOURSELF OR YOUR FAMILY DOWN: 0

## 2023-03-06 NOTE — PROGRESS NOTES
[facility]  [address]  Dept: [phone]  Dept GZQ:[phone]    Anabel Curry is a 80 y.o. female who presents today for her medical conditions/complaints as notedbelow. Anabel Curry is c/o of Diabetes (6mo follow up)      Assessment/Plan:     1. Uncontrolled type 2 diabetes mellitus with hyperglycemia (HCC)  -     Comprehensive Metabolic Panel; Future  -     Microalbumin, Ur; Future  2. Major depressive disorder with single episode, in partial remission ([facility] [address] [facility]  3. Arthritis of left knee  -     bupivacaine (MARCAINE) 0.5 % injection 5 mg; 5 mg (1 mL), Intra-artICUlar, ONCE, 1 dose, On Mon 3/6/23 at 1200  -     lidocaine-EPINEPHrine 2 percent-1:033432 injection 1 mL; 1 mL, Other, ONCE, 1 dose, On Mon 3/6/23 at 1200  -     triamcinolone acetonide (KENALOG-40) injection 40 mg; 40 mg, Intra-artICUlar, ONCE, 1 dose, On Mon 3/6/23 at 1215  4. Brain hemangioma ([facility]  5. Acquired hypothyroidism  -     T4, Free; Future  -     TSH; Future  6. Mixed hyperlipidemia  -     Comprehensive Metabolic Panel; Future  -     Lipid Panel; Future    Can complete another injection today for the left knee pain. If not improved then PT referral.     Continue with the close diabetes monitoring. Watch for the low sugars and try to increase the exercise. Continue with the current 3-4 times daily diabetes checks and insulin injections with meals. Hypothyroidism has been asymptomatic. Continue on her current dosage. Recheck labs in 6 months. Previously noted brain hemangioma has been without symptoms. No further imaging or work-up is necessary unless new neurologic symptoms occur.     Lab Results   Component Value Date    WBC 8.7 09/21/2022    HGB 14.6 09/21/2022    HCT 48.6 (H) 09/21/2022    .7 09/21/2022    CHOL 191 09/21/2022    TRIG 127 09/21/2022    HDL 57 09/21/2022    ALT 15 09/21/2022    AST 17 09/21/2022     09/21/2022    K 4.2 09/21/2022     09/21/2022    CREATININE 0.6 09/21/2022    BUN 18 (H) 09/21/2022    CO2 30 09/21/2022    TSH 0.91 09/21/2022    LABA1C 8.6 10/14/2022    LABA1C 7.7 06/13/2022    LABA1C 8.4 02/21/2022    LABMICR 14.7 (H) 09/21/2022       Return in about 6 months (around 9/6/2023) for AWV, Medication recheck. Subjective:      HPI:     HPI    Marah Carreno is a 80 y.o. female who presents for follow-up of hypertension, diabetes, and hyperlipidemia. She indicates that she is feeling well and denies any symptoms referable to her elevated blood pressure or diabetes. Specifically denies chest pain, palpitations, dyspnea, peripheral edema, thirst, frequent urination, and blurred vision. Current medication regimen is as listed below. She denies any side effects of medication, and has been taking it regularly. Home glucose readings have been still rather high. Does have the CGM and her phone beeps regularly if it goes too low. Wonders what she should do when it goes too high. Checks blood sugars multiple times per day-- the trulicity is multiple times per day. Last eye exam was this summer with Dr. France Jaimes . Diabetic complications includeneuropathy and CAD. shehas been exercising regularly but not as much. Hasbeen following a diabeticdiet. Has been following with the [facility] as well for her diabetes. Currently on 50-70 units of the regular insulin for her diabetes control. Thinks she does not get much exercise-- sits and reads a lot. Eats out a lot, thinks this is from the weight gain is also part of it. The left knee has been still a problem and the balance is off. Knee cracks and aches some-- has been wearing a brace. Thinks this helps some. Aches. Using the volatern gel. Twice daily as needed. Has had some aching in her upper arms bilaterally usually in the evening.  Does read a lot and holds her book with her arms hunched up-- will get better if she gets up and moves around will let up-- not when up and moving and moving around improves her sx.      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 aorta    CARPAL TUNNEL RELEASE      COLONOSCOPY  2010    multiple colon polyps - repeat every 3 years    EYE SURGERY      cataracts - Dr Davide Rodriguez, TOTAL ABDOMINAL (CERVIX REMOVED)      hemorrhaging, complete    THORACIC AORTIC ANEURYSM REPAIR      2011    THYROID SURGERY  1960    gland removed       Family History   Problem Relation Age of Onset    Heart Disease Mother     Heart Disease Father     Breast Cancer Sister         10 years ago     No Known Problems Sister     No Known Problems Sister     No Known Problems Sister        Social History     Tobacco Use    Smoking status: Never    Smokeless tobacco: Never   Substance Use Topics    Alcohol use: No      Current Outpatient Medications   Medication Sig Dispense Refill    Multiple Vitamins-Minerals (EYE VITAMINS PO) Take by mouth      nebivolol (BYSTOLIC) 5 MG tablet TAKE 1/2 (ONE-HALF) TABLET BY MOUTH ONCE DAILY      pravastatin (PRAVACHOL) 40 MG tablet TAKE 1 TABLET BY MOUTH ONCE DAILY levothyroxine (SYNTHROID) 75 MCG tablet Take 1 tablet by mouth once daily 90 tablet 3    DULoxetine (CYMBALTA) 60 MG extended release capsule Take 1 capsule by mouth once daily 90 capsule 3    oxybutynin (DITROPAN XL) 15 MG extended release tablet TAKE 1 TABLET EVERY DAY 90 tablet 3    insulin regular human (HUMULIN R) 500 UNIT/ML concentrated injection vial Inject 45 Units into the skin Daily with supper AND 60 Units 2 times daily (with meals). (Patient taking differently: Inject 50 Units into the skin Daily with supper AND 70 Units 2 times daily (with meals). ) 20 mL 1    Dulaglutide (TRULICITY) 1.5 MR/5.5OO SOPN Inject 3 mg into the skin once a week      esomeprazole (NEXIUM) 40 MG delayed release capsule Take 40 mg by mouth every morning (before breakfast)      aspirin 81 MG chewable tablet Take 81 mg by mouth daily. Multiple Vitamins-Minerals (MULTIVITAMIN ADULT PO) Take 1 tablet by mouth daily      Continuous Blood Gluc Sensor (RetrotopeSTYLE KATIE 14 DAY SENSOR) MISC USE AS DIRECTED 4 TIMES DAILY      B-D UF III MINI PEN NEEDLES 31G X 5 MM MISC USE SYRINGE SUBCUTANEOUSLY 4 TIMES DAILY      Respiratory Therapy Supplies BUD 1 Device by Does not apply route daily New CPAP mask and tubing 1 Device 1    Insulin Pen Needle 30G X 5 MM MISC 1 each by Does not apply route 4 times daily 400 each 5    TRUE METRIX BLOOD GLUCOSE TEST strip TEST four times a day  0     No current facility-administered medications for this visit. Allergies   Allergen Reactions    Exenatide Other (See Comments)     Other reaction(s): stomach upset    Glucophage  [Metformin Hcl] Other (See Comments)    Hydromorphone     Invokana [Canagliflozin]      yeast    Lipitor  [Atorvastatin Calcium] Other (See Comments)    Other      novotwist    Glucophage [Metformin Hcl] Nausea And Vomiting    Ketorolac Nausea And Vomiting    Metformin      Other reaction(s):  Intolerance-unknown  Other reaction(s): Nausea And Vomiting       Health Maintenance Topic Date Due    Flu vaccine (1) 10/14/2023 (Originally 8/1/2022)    COVID-19 Vaccine (3 - Booster for Moderna series) 10/14/2023 (Originally 4/1/2021)    DTaP/Tdap/Td vaccine (2 - Td or Tdap) 04/17/2023    Annual Wellness Visit (AWV)  09/07/2023    Lipids  09/21/2023    Depression Monitoring  03/06/2024    Shingles vaccine  Completed    Pneumococcal 65+ years Vaccine  Completed    Hepatitis A vaccine  Aged Out    Hib vaccine  Aged Out    Meningococcal (ACWY) vaccine  Aged Out         Review of Systems    Objective:     /70 (Site: Left Upper Arm, Position: Sitting, Cuff Size: Large Adult)   Pulse 96   Wt 184 lb (83.5 kg)   LMP 04/24/1987 (Approximate)   SpO2 97%   BMI 31.58 kg/m²     Physical Exam  Vitals and nursing note reviewed. Constitutional:       Appearance: Normal appearance. She is well-developed. HENT:      Head: Normocephalic and atraumatic. Right Ear: External ear normal.      Left Ear: External ear normal.      Nose: Nose normal.      Mouth/Throat:      Pharynx: No oropharyngeal exudate. Eyes:      Conjunctiva/sclera: Conjunctivae normal.   Neck:      Thyroid: No thyromegaly. Vascular: Carotid bruit (soft BILATERAL bruits) present. No JVD. Cardiovascular:      Rate and Rhythm: Normal rate and regular rhythm. Heart sounds: Normal heart sounds. No murmur heard. No friction rub. No gallop. Pulmonary:      Effort: Pulmonary effort is normal. No respiratory distress. Breath sounds: Normal breath sounds. Musculoskeletal:         General: No tenderness. Cervical back: Normal range of motion and neck supple. Right lower leg: No edema. Left lower leg: No edema. Comments: Has chronic OA deformity changes on the left knee with crepitus- no warmth or erythema   Lymphadenopathy:      Cervical: No cervical adenopathy. Skin:     General: Skin is warm. Capillary Refill: Capillary refill takes less than 2 seconds.    Neurological:      General: No focal deficit present. Mental Status: She is alert and oriented to person, place, and time. Cranial Nerves: No cranial nerve deficit. Coordination: Coordination normal.   Psychiatric:         Mood and Affect: Mood normal.         Behavior: Behavior normal.         Thought Content: Thought content normal.         Judgment: Judgment normal.         Procedure Note    PREOP DIAGNOSIS:  [x] Right []  Left    [] Bilateral Knee Pain    POSTOP DIAGNOSIS:  [x] Right []  Left    [] Bilateral Knee Pain    OPERATION:  [x] Right []  Left    [] Bilateral INTRA-ARTICULAR KNEE INJECTION      PROCEDURE:  After sterile prep with betadine and alcohol, an Medial approach was used. [x] 40 mg Kenalog  [x]  1 cc 1% lidocaine with Epi       []  12 mg of Celestone       [x]  1 0.5% cc Marcaine     Injected intra-articularly without incident. Pre- and post neurovascular status verified. No complications note      Multiple labs and other testing may have been ordered which may not be completely evident from the above note due to system interface incompatibilities. Patient given educational materials - see patientinstructions. Discussed use, benefit, and side effects of prescribed medications. All patient questions answered. Pt voiced understanding. Reviewed health maintenance. Instructed to continue current medications, diet andexercise. Patient agreed with treatment plan. Follow up as directed.      (Please note that portions of this note were completed with a voice-recognition program. Efforts were made to edit the dictation but occasionally words are mis-transcribed.)    Electronically signed by Garry Alas MD on 3/12/2023

## 2023-06-05 ENCOUNTER — OFFICE VISIT (OUTPATIENT)
Dept: FAMILY MEDICINE CLINIC | Age: 88
End: 2023-06-05
Payer: MEDICARE

## 2023-06-05 VITALS
WEIGHT: 184 LBS | SYSTOLIC BLOOD PRESSURE: 126 MMHG | DIASTOLIC BLOOD PRESSURE: 76 MMHG | OXYGEN SATURATION: 96 % | BODY MASS INDEX: 31.58 KG/M2 | HEART RATE: 68 BPM

## 2023-06-05 DIAGNOSIS — L23.9 ALLERGIC DERMATITIS: Primary | ICD-10-CM

## 2023-06-05 DIAGNOSIS — E11.65 UNCONTROLLED TYPE 2 DIABETES MELLITUS WITH HYPERGLYCEMIA (HCC): ICD-10-CM

## 2023-06-05 PROBLEM — E11.9 TYPE 2 DIABETES MELLITUS (HCC): Status: ACTIVE | Noted: 2023-06-05

## 2023-06-05 PROCEDURE — 99214 OFFICE O/P EST MOD 30 MIN: CPT | Performed by: FAMILY MEDICINE

## 2023-06-05 PROCEDURE — 99211 OFF/OP EST MAY X REQ PHY/QHP: CPT | Performed by: FAMILY MEDICINE

## 2023-06-05 PROCEDURE — 1123F ACP DISCUSS/DSCN MKR DOCD: CPT | Performed by: FAMILY MEDICINE

## 2023-06-05 RX ORDER — TACROLIMUS 0.3 MG/G
OINTMENT TOPICAL
Qty: 30 G | Refills: 1 | Status: SHIPPED | OUTPATIENT
Start: 2023-06-05

## 2023-06-05 RX ORDER — GABAPENTIN 100 MG/1
100-300 CAPSULE ORAL NIGHTLY
COMMUNITY
Start: 2023-05-30

## 2023-06-05 NOTE — PATIENT INSTRUCTIONS
Use claritin or the zyrtec to help with the itching and the cold wash clothes. Use the prescription cream twice daily-- let us know if this is not improving by the end of the week.

## 2023-06-05 NOTE — PROGRESS NOTES
LABMICR 14.7 (H) 09/21/2022       Return if symptoms worsen or fail to improve. Subjective:      HPI:     HPI    Has itching and irritation around her eyes and onto her face-- started on the left-- they swelled and looked inflamed. Has the long standing type 2 diabetes which has been impacting her care and she thinks the stress of the current rash has possibly affected her blood sugars. Has the left knee pain and did very well after her knee injection but this has been returning which is limited her walking. She has not had any exposures that she knows that may have triggered her current dermatitis. She has not been out working in her yard but her  has been. She has had no changes in lotions make-ups or other new products or changes in her diet. Blood sugars have in the 300-400 range since she has been out of the trulicity. Has not been able to get this prior up with her insurance. Endocrinology was working on this but she has not yet heard.   Since has been off of the Trulicity blood sugars have been higher    BP Readings from Last 3 Encounters:   06/05/23 126/76   03/06/23 112/70   11/17/22 124/76          (goal 120/80)    Wt Readings from Last 3 Encounters:   06/05/23 184 lb (83.5 kg)   03/06/23 184 lb (83.5 kg)   11/17/22 183 lb (83 kg)        Past Medical History:   Diagnosis Date    CAD (coronary artery disease)     Depression     Hypertension     Hypothyroidism     Major depressive disorder with single episode, [facility] 10/14/2018    Neuropathy     Neuropathy     Shingles 05/2017    Sleep apnea     CPAP    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

## 2023-09-07 ENCOUNTER — TELEPHONE (OUTPATIENT)
Dept: FAMILY MEDICINE CLINIC | Age: 88
End: 2023-09-07

## 2023-09-07 ENCOUNTER — OFFICE VISIT (OUTPATIENT)
Dept: FAMILY MEDICINE CLINIC | Age: 88
End: 2023-09-07
Payer: MEDICARE

## 2023-09-07 VITALS
DIASTOLIC BLOOD PRESSURE: 64 MMHG | HEIGHT: 64 IN | SYSTOLIC BLOOD PRESSURE: 120 MMHG | HEART RATE: 83 BPM | OXYGEN SATURATION: 96 % | WEIGHT: 175 LBS | BODY MASS INDEX: 29.88 KG/M2

## 2023-09-07 DIAGNOSIS — Z00.00 MEDICARE ANNUAL WELLNESS VISIT, SUBSEQUENT: Primary | ICD-10-CM

## 2023-09-07 DIAGNOSIS — M17.12 ARTHRITIS OF LEFT KNEE: ICD-10-CM

## 2023-09-07 DIAGNOSIS — Z23 NEED FOR INFLUENZA VACCINATION: ICD-10-CM

## 2023-09-07 DIAGNOSIS — I20.0 UNSTABLE ANGINA PECTORIS (HCC): ICD-10-CM

## 2023-09-07 DIAGNOSIS — E11.65 UNCONTROLLED TYPE 2 DIABETES MELLITUS WITH HYPERGLYCEMIA (HCC): ICD-10-CM

## 2023-09-07 LAB
BASOPHILS %: 0.46 (ref 0–3)
BASOPHILS ABSOLUTE: 0.03 (ref 0–0.3)
EOSINOPHILS %: 1.75 (ref 0–10)
EOSINOPHILS ABSOLUTE: 0.12 (ref 0–1.1)
HBA1C MFR BLD: 9.8 %
HCT VFR BLD CALC: 45.7 % (ref 37–47)
HEMOGLOBIN: 13.4 (ref 12–16)
LYMPHOCYTE %: 23.25 (ref 20–51.1)
LYMPHOCYTES ABSOLUTE: 1.59 (ref 1–5.5)
MCH RBC QN AUTO: 25.9 PG (ref 28.5–32.5)
MCHC RBC AUTO-ENTMCNC: 29.3 G/DL (ref 32–37)
MCV RBC AUTO: 88.4 FL (ref 80–94)
MONOCYTES %: 7.37 (ref 1.7–9.3)
MONOCYTES ABSOLUTE: 0.5 (ref 0.1–1)
NEUTROPHILS %: 67.18 (ref 42.2–75.2)
NEUTROPHILS ABSOLUTE: 4.6 (ref 2–8.1)
PDW BLD-RTO: 13.4 % (ref 8.5–15.5)
PLATELET # BLD: 214 THOU/MM3 (ref 130–400)
RBC: 5.17 M/UL (ref 4.2–5.4)
TROPONIN I: 0.05 NG/ML (ref 0–0.03)
WBC: 6.9 THOU/ML3 (ref 4.8–10.8)

## 2023-09-07 PROCEDURE — PBSHW POCT GLYCOSYLATED HEMOGLOBIN (HGB A1C): Performed by: FAMILY MEDICINE

## 2023-09-07 PROCEDURE — 93010 ELECTROCARDIOGRAM REPORT: CPT | Performed by: FAMILY MEDICINE

## 2023-09-07 PROCEDURE — PBSHW INFLUENZA, FLUAD, (AGE 65 Y+), IM, PF, 0.5 ML: Performed by: FAMILY MEDICINE

## 2023-09-07 PROCEDURE — 93005 ELECTROCARDIOGRAM TRACING: CPT | Performed by: FAMILY MEDICINE

## 2023-09-07 PROCEDURE — 99215 OFFICE O/P EST HI 40 MIN: CPT | Performed by: FAMILY MEDICINE

## 2023-09-07 PROCEDURE — 3046F HEMOGLOBIN A1C LEVEL >9.0%: CPT | Performed by: FAMILY MEDICINE

## 2023-09-07 PROCEDURE — G0439 PPPS, SUBSEQ VISIT: HCPCS | Performed by: FAMILY MEDICINE

## 2023-09-07 PROCEDURE — G0008 ADMIN INFLUENZA VIRUS VAC: HCPCS | Performed by: FAMILY MEDICINE

## 2023-09-07 PROCEDURE — 83036 HEMOGLOBIN GLYCOSYLATED A1C: CPT | Performed by: FAMILY MEDICINE

## 2023-09-07 PROCEDURE — 99397 PER PM REEVAL EST PAT 65+ YR: CPT | Performed by: FAMILY MEDICINE

## 2023-09-07 PROCEDURE — 1123F ACP DISCUSS/DSCN MKR DOCD: CPT | Performed by: FAMILY MEDICINE

## 2023-09-07 ASSESSMENT — PATIENT HEALTH QUESTIONNAIRE - PHQ9
4. FEELING TIRED OR HAVING LITTLE ENERGY: 3
9. THOUGHTS THAT YOU WOULD BE BETTER OFF DEAD, OR OF HURTING YOURSELF: 0
SUM OF ALL RESPONSES TO PHQ QUESTIONS 1-9: 4
SUM OF ALL RESPONSES TO PHQ QUESTIONS 1-9: 4
1. LITTLE INTEREST OR PLEASURE IN DOING THINGS: 0
SUM OF ALL RESPONSES TO PHQ9 QUESTIONS 1 & 2: 1
SUM OF ALL RESPONSES TO PHQ QUESTIONS 1-9: 4
8. MOVING OR SPEAKING SO SLOWLY THAT OTHER PEOPLE COULD HAVE NOTICED. OR THE OPPOSITE, BEING SO FIGETY OR RESTLESS THAT YOU HAVE BEEN MOVING AROUND A LOT MORE THAN USUAL: 0
7. TROUBLE CONCENTRATING ON THINGS, SUCH AS READING THE NEWSPAPER OR WATCHING TELEVISION: 0
6. FEELING BAD ABOUT YOURSELF - OR THAT YOU ARE A FAILURE OR HAVE LET YOURSELF OR YOUR FAMILY DOWN: 0
5. POOR APPETITE OR OVEREATING: 0
3. TROUBLE FALLING OR STAYING ASLEEP: 0
2. FEELING DOWN, DEPRESSED OR HOPELESS: 1
SUM OF ALL RESPONSES TO PHQ QUESTIONS 1-9: 4

## 2023-09-07 ASSESSMENT — LIFESTYLE VARIABLES
HOW OFTEN DO YOU HAVE A DRINK CONTAINING ALCOHOL: NEVER
HOW MANY STANDARD DRINKS CONTAINING ALCOHOL DO YOU HAVE ON A TYPICAL DAY: PATIENT DOES NOT DRINK

## 2023-09-07 NOTE — PATIENT INSTRUCTIONS
your Preventive Care list are included within your After Visit Summary for your review. Other Preventive Recommendations:    A preventive eye exam performed by an eye specialist is recommended every 1-2 years to screen for glaucoma; cataracts, macular degeneration, and other eye disorders. A preventive dental visit is recommended every 6 months. Try to get at least 150 minutes of exercise per week or 10,000 steps per day on a pedometer . Order or download the FREE \"Exercise & Physical Activity: Your Everyday Guide\" from [employer]. Call 5-862.196.6946 or search [employer] online. You need 4059-3601 mg of calcium and 2996-3933 IU of vitamin D per day. It is possible to meet your calcium requirement with diet alone, but a vitamin D supplement is usually necessary to meet this goal.  When exposed to the sun, use a sunscreen that protects against both UVA and UVB radiation with an SPF of 30 or greater. Reapply every 2 to 3 hours or after sweating, drying off with a towel, or swimming. Always wear a seat belt when traveling in a car. Always wear a helmet when riding a bicycle or motorcycle.

## 2023-09-07 NOTE — PROGRESS NOTES
Have you had an allergic reaction to the flu (influenza) shot? no  Are you allergic to eggs or any component of the flu vaccine? no  Do you have a history of Guillain-Millington Syndrome (GBS), which is paralysis after receiving the flu vaccine? no  Are you feeling well today? yes  Flu vaccine given as ordered. Patient tolerated it well. No questions re: VIS information. After obtaining consent, and per orders of Dr. Farhat Shearer, injection of FLU VACCINE given in Right vastus lateralis by Luly Nye MA. Patient tolerated well. Patient instructed to remain in clinic for 20 minutes afterwards, and to report any adverse reaction immediately.

## 2023-09-07 NOTE — PROGRESS NOTES
Medicare Annual Wellness Visit    Fernando Freeman is here for Medicare AWV and Knee Pain (Pt states she has left knee pain. Pt states that she has had issues with left knee pain )    Assessment & Plan   Medicare annual wellness visit, subsequent  Uncontrolled type 2 diabetes mellitus with hyperglycemia ([address])  -     POCT glycosylated hemoglobin (Hb A1C)  Need for influenza vaccination  -     Influenza, FLUAD, (age 72 y+), IM, PF, 0.5 mL  Arthritis of left knee  -     [facility] [place]  Unstable angina pectoris [facility])  -     Exercise stress test; Future  -     Nuclear stress test with myocardial perfusion; Future  -     EKG 12 Lead    Recommendations for Preventive Services Due: see orders and patient instructions/AVS.    Patient has a remote history of Cardiac disease. She has symptoms that are highly suggestive of angina. With this in mind we will schedule the cardiac stress test-- cannot walk on a treadmill de to her chronic knee issues. Reviewed emergency procedures if symptoms persist or escalate. Immediately after patient visit when troponin was noted to be mildly elevated reviewed this with patient's cardiologist Dr. Brian Santiago personally with the EKG which showed changes  from her previous in his system (reviewed this EKG also). He recommended having patient seen urgently at [facility] to consider urgent cardiac cath. Patient referred to [facility] ER with phone call personally. In addition to the well visit today a high complexity of medical decision making with the decision to hospitalize, discussion and review of previous testing with her specialist.      Recommended screening schedule for the next 5-10 years is provided to the patient in written form: see Patient Instructions/AVS.     Return in about 6 months (around 3/7/2024) for Medication recheck.      Subjective   The following acute and/or chronic problems were also addressed today:  Fernando Freeman is a 80 y.o. female who

## 2023-09-07 NOTE — TELEPHONE ENCOUNTER
About the abnormal EKG and abnormal troponin result. Dr. Blake Winchester advised patient to go to [facility] emergency room for possible admission and cardiac catheterization.

## 2023-09-11 ENCOUNTER — TELEPHONE (OUTPATIENT)
Dept: FAMILY MEDICINE CLINIC | Age: 88
End: 2023-09-11

## 2023-09-11 NOTE — TELEPHONE ENCOUNTER
Care Transitions Initial Follow Up Call    Outreach made within 2 business days of discharge: Yes    Patient: Roxanna Whalen Patient : 1935   MRN: [id number]  Reason for Admission: abnormal EKG, elevated tropin   Discharge Date: 23        Spoke with: patient    Discharge department/facility: [address] [place] [address] Patient Contact:  Was patient able to fill all prescriptions: Yes  Was patient instructed to bring all medications to the follow-up visit: Yes  Is patient taking all medications as directed in the discharge summary?  Yes  Does patient understand their discharge instructions: Yes  Does patient have questions or concerns that need addressed prior to 7-14 day follow up office visit: no    Scheduled appointment with PCP within 7-14 days    Follow Up  Future Appointments   Date Time Provider [address] [place] [address]   2023 11:20 AM Shane Hunt MD [facility]   3/7/2024 11:00 AM Shane Hunt MD [facility]       Jeanmarie Nunez LPN

## 2023-09-20 ENCOUNTER — TELEPHONE (OUTPATIENT)
Dept: FAMILY MEDICINE CLINIC | Age: 88
End: 2023-09-20

## 2023-09-20 NOTE — TELEPHONE ENCOUNTER
Care Transitions Initial Follow Up Call    Outreach made within 2 business days of discharge: Yes    Patient: Agus Wheeler Patient : 1935   MRN: [id number]  Reason for Admission: NSTEMI  Discharge Date:         Spoke with: patient     Dis[facility]    TCM Interactive Patient Contact:  Was patient able to fill all prescriptions: Yes  Was patient instructed to bring all medications to the follow-up visit: Yes  Is patient taking all medications as directed in the discharge summary? Yes  Does patient understand their discharge instructions: Yes  Does patient have questions or concerns that need addressed prior to 7-14 day follow up office visit: no  Patient states she is really tired but they told her she would be. They did put in 2 stents. Denies any questions or concerns at this time. She states she feels pretty good.      Scheduled appointment with PCP within 7-14 days    Follow Up  Future Appointments   Date Time Provider [address]   10/2/2023  3:00 PM Juan C Dacosta MD [facility]   3/7/2024 11:00 AM Juan C Dacosta MD [facility]       Vincent Tavera LPN

## 2023-09-28 ENCOUNTER — TELEPHONE (OUTPATIENT)
Dept: FAMILY MEDICINE CLINIC | Age: 88
End: 2023-09-28

## 2023-09-28 NOTE — TELEPHONE ENCOUNTER
----- Message from Mariely Kern sent at 9/28/2023 11:06 AM EDT -----  Subject: Message to Provider    QUESTIONS  Information for Provider? Patient called to inform that after speaking   with her current insurance company she was told that they will pay for her   f/u appointments - for [facility], there will be a $0 copay and any   specialist will have $35 copay.  ---------------------------------------------------------------------------  --------------  [address]  [phone]; Do not leave any message, patient will call back for answer  ---------------------------------------------------------------------------  --------------  SCRIPT ANSWERS  Relationship to Patient?  Self

## 2023-10-02 ENCOUNTER — OFFICE VISIT (OUTPATIENT)
Dept: FAMILY MEDICINE CLINIC | Age: 88
End: 2023-10-02

## 2023-10-02 VITALS
BODY MASS INDEX: 29.52 KG/M2 | WEIGHT: 172 LBS | HEART RATE: 86 BPM | SYSTOLIC BLOOD PRESSURE: 122 MMHG | DIASTOLIC BLOOD PRESSURE: 70 MMHG | OXYGEN SATURATION: 98 %

## 2023-10-02 DIAGNOSIS — I21.4 NSTEMI (NON-ST ELEVATED MYOCARDIAL INFARCTION) (HCC): Primary | ICD-10-CM

## 2023-10-02 DIAGNOSIS — G89.29 CHRONIC RIGHT SHOULDER PAIN: ICD-10-CM

## 2023-10-02 DIAGNOSIS — E78.2 MIXED HYPERLIPIDEMIA: ICD-10-CM

## 2023-10-02 DIAGNOSIS — Z09 HOSPITAL DISCHARGE FOLLOW-UP: ICD-10-CM

## 2023-10-02 DIAGNOSIS — M25.511 CHRONIC RIGHT SHOULDER PAIN: ICD-10-CM

## 2023-10-02 DIAGNOSIS — I25.10 CORONARY ARTERY DISEASE INVOLVING NATIVE CORONARY ARTERY OF NATIVE HEART WITHOUT ANGINA PECTORIS: ICD-10-CM

## 2023-10-02 DIAGNOSIS — M17.12 ARTHRITIS OF LEFT KNEE: ICD-10-CM

## 2023-10-02 PROBLEM — Z95.5 STATUS POST INSERTION OF DRUG-ELUTING STENT INTO LEFT ANTERIOR DESCENDING (LAD) ARTERY: Status: ACTIVE | Noted: 2023-09-26

## 2023-10-02 RX ORDER — TRIAMCINOLONE ACETONIDE 40 MG/ML
40 INJECTION, SUSPENSION INTRA-ARTICULAR; INTRAMUSCULAR ONCE
Status: COMPLETED | OUTPATIENT
Start: 2023-10-02 | End: 2023-10-02

## 2023-10-02 RX ORDER — ROSUVASTATIN CALCIUM 5 MG/1
TABLET, COATED ORAL
COMMUNITY
Start: 2023-09-19

## 2023-10-02 RX ORDER — BUPIVACAINE HYDROCHLORIDE 5 MG/ML
1 INJECTION, SOLUTION EPIDURAL; INTRACAUDAL ONCE
Status: COMPLETED | OUTPATIENT
Start: 2023-10-02 | End: 2023-10-02

## 2023-10-02 RX ORDER — LIDOCAINE HCL/EPINEPHRINE/PF 2%-1:200K
20 VIAL (ML) INJECTION ONCE
Status: COMPLETED | OUTPATIENT
Start: 2023-10-02 | End: 2023-10-02

## 2023-10-02 RX ADMIN — BUPIVACAINE HYDROCHLORIDE 5 MG: 5 INJECTION, SOLUTION EPIDURAL; INTRACAUDAL at 16:03

## 2023-10-02 RX ADMIN — Medication 20 ML: at 16:05

## 2023-10-02 RX ADMIN — TRIAMCINOLONE ACETONIDE 40 MG: 40 INJECTION, SUSPENSION INTRA-ARTICULAR; INTRAMUSCULAR at 16:05

## 2023-10-02 NOTE — PROGRESS NOTES
Post-Discharge Transitional Care Follow Up    Tyree Sandhoff   YOB: 1935    Date of Office Visit:  10/2/2023  Date of Hospital Admission: 9/16  Date of Hospital Discharge: 9/19/23    Care management risk score Rising risk (score 2-5) and Complex Care (Scores >=6): No Risk Score On File     Non face to face  following discharge, date last encounter closed (first attempt may have been earlier): *No documented post hospital discharge outreach found in the last 14 days     Call initiated 2 business days of discharge: *No response recorded in the last 14 days    ASSESSMENT/PLAN:   NSTEMI (non-ST elevated myocardial infarction) [facility])  Hospital discharge follow-up  -     MT DISCHARGE MEDS RECONCILED W/ CURRENT OUTPATIENT MED LIST  Coronary artery disease involving native coronary artery of native heart without angina pectoris  Arthritis of left knee  -     lidocaine-EPINEPHrine 2 percent-1:423742 injection 20 mL; 20 mL, IntraDERmal, ONCE, 1 dose, On Mon 10/2/23 at 1615  -     bupivacaine (PF) (MARCAINE) 0.5 % injection 5 mg; 5 mg (1 mL), Injection, ONCE, 1 dose, On Mon 10/2/23 at 1615  -     triamcinolone acetonide (KENALOG-40) injection 40 mg; 40 mg, Intra-artICUlar, ONCE, 1 dose, On Mon 10/2/23 at 1615  Chronic right shoulder pain  Mixed hyperlipidemia    Continue with the crestor and bystolic for secondary prevention. Referral to cardiac rehab as well. Knee injection today. Keep this moving as much as possible. For the right shoulder reviewed HEP and can use the topical voltaren gel and consider injection if increased sx. Medical Decision Making: moderate complexity  Return for As scheduled. Subjective:   HPI    Leann Sotelo and was admitted to [facility] with chest pain from [facility].  Had a KORIN placed on 9/18. Started on dual antiplatelet therapy and rosuvastatin. Echo showed ischemic cardiomyopathy with an EF of 45 to 50%.   She has been doing

## 2023-10-03 RX ORDER — DULOXETIN HYDROCHLORIDE 60 MG/1
CAPSULE, DELAYED RELEASE ORAL
Qty: 90 CAPSULE | Refills: 1 | Status: SHIPPED | OUTPATIENT
Start: 2023-10-03

## 2023-10-03 NOTE — TELEPHONE ENCOUNTER
Jacy Hernandez is requesting a refill on the following medication(s):  Requested Prescriptions     Pending Prescriptions Disp Refills    DULoxetine (CYMBALTA) 60 MG extended release capsule [Pharmacy Med Name: DULoxetine HCl 60 MG Oral Capsule Delayed Release Particles] 90 capsule 0     Sig: Take 1 capsule by mouth once daily       Last Visit Date (If Applicable):  73/0/9545    Next Visit Date:    3/7/2024

## 2023-11-06 DIAGNOSIS — N32.81 OVERACTIVE BLADDER: ICD-10-CM

## 2023-11-06 RX ORDER — OXYBUTYNIN CHLORIDE 15 MG/1
TABLET, EXTENDED RELEASE ORAL
Qty: 90 TABLET | Refills: 1 | Status: SHIPPED | OUTPATIENT
Start: 2023-11-06

## 2023-11-06 NOTE — TELEPHONE ENCOUNTER
Tyree Sandhoff is requesting a refill on the following medication(s):  Requested Prescriptions     Pending Prescriptions Disp Refills    oxybutynin (DITROPAN XL) 15 MG extended release tablet [Pharmacy Med Name: Oxybutynin Chloride ER 15 MG Oral Tablet Extended Release 24 Hour] 90 tablet 0     Sig: Take 1 tablet by mouth once daily       Last Visit Date (If Applicable):  03/1/6147    Next Visit Date:    3/7/2024

## 2023-11-09 ENCOUNTER — OFFICE VISIT (OUTPATIENT)
Dept: FAMILY MEDICINE CLINIC | Age: 88
End: 2023-11-09
Payer: MEDICARE

## 2023-11-09 VITALS
BODY MASS INDEX: 28.32 KG/M2 | HEART RATE: 99 BPM | OXYGEN SATURATION: 97 % | WEIGHT: 165 LBS | SYSTOLIC BLOOD PRESSURE: 124 MMHG | DIASTOLIC BLOOD PRESSURE: 72 MMHG

## 2023-11-09 DIAGNOSIS — L08.9 SUPERFICIAL INJURY OF LEFT FOOT AND TOES WITH INFECTION, INITIAL ENCOUNTER: Primary | ICD-10-CM

## 2023-11-09 DIAGNOSIS — E11.65 TYPE 2 DIABETES MELLITUS WITH HYPERGLYCEMIA, WITH LONG-TERM CURRENT USE OF INSULIN (HCC): ICD-10-CM

## 2023-11-09 DIAGNOSIS — S90.935A SUPERFICIAL INJURY OF LEFT FOOT AND TOES WITH INFECTION, INITIAL ENCOUNTER: Primary | ICD-10-CM

## 2023-11-09 DIAGNOSIS — M75.111 NONTRAUMATIC INCOMPLETE TEAR OF RIGHT ROTATOR CUFF: ICD-10-CM

## 2023-11-09 DIAGNOSIS — S90.922A SUPERFICIAL INJURY OF LEFT FOOT AND TOES WITH INFECTION, INITIAL ENCOUNTER: Primary | ICD-10-CM

## 2023-11-09 DIAGNOSIS — Z79.4 TYPE 2 DIABETES MELLITUS WITH HYPERGLYCEMIA, WITH LONG-TERM CURRENT USE OF INSULIN (HCC): ICD-10-CM

## 2023-11-09 DIAGNOSIS — M79.89 LEFT LEG SWELLING: ICD-10-CM

## 2023-11-09 DIAGNOSIS — L30.9 DERMATITIS: ICD-10-CM

## 2023-11-09 PROCEDURE — 99212 OFFICE O/P EST SF 10 MIN: CPT | Performed by: FAMILY MEDICINE

## 2023-11-09 PROCEDURE — 99215 OFFICE O/P EST HI 40 MIN: CPT | Performed by: FAMILY MEDICINE

## 2023-11-09 PROCEDURE — 3052F HG A1C>EQUAL 8.0%<EQUAL 9.0%: CPT | Performed by: FAMILY MEDICINE

## 2023-11-09 PROCEDURE — 1123F ACP DISCUSS/DSCN MKR DOCD: CPT | Performed by: FAMILY MEDICINE

## 2023-11-09 RX ORDER — METFORMIN HYDROCHLORIDE 500 MG/1
TABLET, EXTENDED RELEASE ORAL
COMMUNITY
Start: 2023-11-03 | End: 2023-11-09 | Stop reason: ALTCHOICE

## 2023-11-09 RX ORDER — AMOXICILLIN AND CLAVULANATE POTASSIUM 875; 125 MG/1; MG/1
1 TABLET, FILM COATED ORAL 2 TIMES DAILY
Qty: 14 TABLET | Refills: 0 | Status: SHIPPED | OUTPATIENT
Start: 2023-11-09 | End: 2023-11-16

## 2023-11-09 NOTE — PROGRESS NOTES
lower calf. 2+ soft pitting edema to the mid lower leg on the left.  negative Sylvie   Lymphadenopathy:      Cervical: No cervical adenopathy. Skin:     General: Skin is warm. Capillary Refill: Capillary refill takes less than 2 seconds. Neurological:      General: No focal deficit present. Mental Status: She is alert and oriented to person, place, and time. Cranial Nerves: No cranial nerve deficit. Coordination: Coordination normal.   Psychiatric:         Mood and Affect: Mood normal.         Behavior: Behavior normal.         Thought Content: Thought content normal.         Judgment: Judgment normal.                     Multiple labs and other testing may have been ordered which may not be completely evident from the above note due to system interface incompatibilities. Patient given educational materials - see patientinstructions. Discussed use, benefit, and side effects of prescribed medications. All patient questions answered. Pt voiced understanding. Reviewed health maintenance. Instructed to continue current medications, diet andexercise. Patient agreed with treatment plan. Follow up as directed.      (Please note that portions of this note were completed with a voice-recognition program. Efforts were made to edit the dictation but occasionally words are mis-transcribed.)    Electronically signed by Yasmin Delarosa MD on 11/9/2023

## 2023-11-13 ENCOUNTER — HOSPITAL ENCOUNTER (OUTPATIENT)
Dept: WOUND CARE | Age: 88
Discharge: HOME | End: 2023-11-14
Payer: MEDICARE

## 2023-11-13 VITALS
RESPIRATION RATE: 18 BRPM | WEIGHT: 168 LBS | BODY MASS INDEX: 28.68 KG/M2 | SYSTOLIC BLOOD PRESSURE: 128 MMHG | HEART RATE: 84 BPM | DIASTOLIC BLOOD PRESSURE: 78 MMHG | HEIGHT: 64 IN | TEMPERATURE: 97.3 F

## 2023-11-13 DIAGNOSIS — E11.42 DM TYPE 2 WITH DIABETIC PERIPHERAL NEUROPATHY (HCC): ICD-10-CM

## 2023-11-13 DIAGNOSIS — M20.42 HAMMER TOES OF BOTH FEET: ICD-10-CM

## 2023-11-13 DIAGNOSIS — M20.41 HAMMER TOES OF BOTH FEET: ICD-10-CM

## 2023-11-13 DIAGNOSIS — L97.521 SKIN ULCER OF TOE OF LEFT FOOT, LIMITED TO BREAKDOWN OF SKIN (HCC): ICD-10-CM

## 2023-11-13 PROCEDURE — 99204 OFFICE O/P NEW MOD 45 MIN: CPT

## 2023-11-13 PROCEDURE — 97597 DBRDMT OPN WND 1ST 20 CM/<: CPT

## 2023-11-13 PROCEDURE — 97597 DBRDMT OPN WND 1ST 20 CM/<: CPT | Performed by: PODIATRIST

## 2023-11-13 PROCEDURE — 99204 OFFICE O/P NEW MOD 45 MIN: CPT | Performed by: PODIATRIST

## 2023-11-13 RX ORDER — LIDOCAINE HYDROCHLORIDE 20 MG/ML
JELLY TOPICAL ONCE
OUTPATIENT
Start: 2023-11-13 | End: 2023-11-13

## 2023-11-13 RX ORDER — IBUPROFEN 200 MG
TABLET ORAL ONCE
OUTPATIENT
Start: 2023-11-13 | End: 2023-11-13

## 2023-11-13 RX ORDER — TRIAMCINOLONE ACETONIDE 1 MG/G
OINTMENT TOPICAL ONCE
OUTPATIENT
Start: 2023-11-13 | End: 2023-11-13

## 2023-11-13 RX ORDER — GENTAMICIN SULFATE 1 MG/G
OINTMENT TOPICAL ONCE
OUTPATIENT
Start: 2023-11-13 | End: 2023-11-13

## 2023-11-13 RX ORDER — SODIUM CHLOR/HYPOCHLOROUS ACID 0.033 %
SOLUTION, IRRIGATION IRRIGATION ONCE
OUTPATIENT
Start: 2023-11-13 | End: 2023-11-13

## 2023-11-13 RX ORDER — GINSENG 100 MG
CAPSULE ORAL ONCE
OUTPATIENT
Start: 2023-11-13 | End: 2023-11-13

## 2023-11-13 RX ORDER — BACITRACIN ZINC AND POLYMYXIN B SULFATE 500; 1000 [USP'U]/G; [USP'U]/G
OINTMENT TOPICAL ONCE
OUTPATIENT
Start: 2023-11-13 | End: 2023-11-13

## 2023-11-13 RX ORDER — LIDOCAINE HYDROCHLORIDE 40 MG/ML
SOLUTION TOPICAL ONCE
OUTPATIENT
Start: 2023-11-13 | End: 2023-11-13

## 2023-11-13 RX ORDER — CLOBETASOL PROPIONATE 0.5 MG/G
OINTMENT TOPICAL ONCE
OUTPATIENT
Start: 2023-11-13 | End: 2023-11-13

## 2023-11-13 RX ORDER — BETAMETHASONE DIPROPIONATE 0.5 MG/G
CREAM TOPICAL ONCE
OUTPATIENT
Start: 2023-11-13 | End: 2023-11-13

## 2023-11-13 RX ORDER — LIDOCAINE 40 MG/G
CREAM TOPICAL ONCE
OUTPATIENT
Start: 2023-11-13 | End: 2023-11-13

## 2023-11-13 RX ORDER — LIDOCAINE 50 MG/G
OINTMENT TOPICAL ONCE
OUTPATIENT
Start: 2023-11-13 | End: 2023-11-13

## 2023-11-13 ASSESSMENT — PAIN SCALES - GENERAL: PAINLEVEL_OUTOF10: 0

## 2023-11-13 NOTE — DISCHARGE INSTRUCTIONS
Follow up as scheduled with Dr. Dwayne Gonzales in wound care. Check in at Radiology for appointment. Place silvadene cream to left 3rd toe wound. Cover with dry dressing. Change dressing daily. Cream has been sent to your pharmacy. Wear offloading shoe to left foot every time you are walking. SIGNS OF INFECTION  - Redness, swelling, skin hot  - Wound bed turns black or stringy yellow  - Foul odor  - Increased drainage or pus  - Increased pain  - Fever greater than 100F    CALL YOUR DOCTOR OR SEEK MEDICAL ATTENTION IF SIGNS OF INFECTION.   DO NOT WAIT UNTIL YOUR NEXT APPOINTMENT    Call the Wound Care Nurse with any other questions or concerns- [phone]

## 2023-11-13 NOTE — PROGRESS NOTES
Subjective:    Guillermo Mayfield is a 80 y.o. female who presents with the ulceration of the L toe. Patient has not been compliant with off loading. Patient relates pain is Present. Pain is rated 1 out of 10 and is described as mild. Currently denies F/C/N/V. Patient states she has been taking antibiotic which is helped. Patient is redness is improved a lot. Patient states other testing was to be done but is yet to be scheduled. Patient did have an x-ray performed. Treatment at home so far is soaking. Overall diabetic control is not changed. Allergies   Allergen Reactions    Exenatide Other (See Comments)     Other reaction(s): stomach upset    Glucophage  [Metformin Hcl] Other (See Comments)    Hydromorphone     Invokana [Canagliflozin]      yeast    Lipitor  [Atorvastatin Calcium] Other (See Comments)    Other      novotwist    Glucophage [Metformin Hcl] Nausea And Vomiting    Ketorolac Nausea And Vomiting    Metformin      Other reaction(s): Intolerance-unknown  Other reaction(s): Nausea And Vomiting       Past Medical History:   Diagnosis Date    CAD (coronary artery disease)     Depression     Hypertension     Hypothyroidism     Major depressive disorder with single episode, in partial remission ([address]) 10/14/2018    Neuropathy     Neuropathy     Shingles 05/2017    Sleep apnea     CPAP    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 Authorizing Provider   silver sulfADIAZINE (SILVADENE) 1 % cream Apply topically daily. 11/13/23  Yes Lambert Many, DPM   fluocinonide (LIDEX) 0.05 % cream Apply topically 2 times daily.  11/9/23   Emil Nixon MD   amoxicillin-clavulanate (AUGMENTIN) 875-125 MG per tablet Take 1 tablet by mouth 2 times daily for 7

## 2023-11-14 DIAGNOSIS — E03.9 ACQUIRED HYPOTHYROIDISM: ICD-10-CM

## 2023-11-14 NOTE — TELEPHONE ENCOUNTER
Liv Ron is requesting a refill on the following medication(s):  Requested Prescriptions     Pending Prescriptions Disp Refills    levothyroxine (SYNTHROID) 75 MCG tablet [Pharmacy Med Name: Levothyroxine Sodium 75 MCG Oral Tablet] 90 tablet 1     Sig: Take 1 tablet by mouth once daily       Last Visit Date (If Applicable):  11/9/2023    Next Visit Date:    3/7/2024

## 2023-11-15 LAB
T4 FREE: 1.57 NG/DL (ref 0.78–2.19)
TSH SERPL DL<=0.05 MIU/L-ACNC: <0.02 MIU/ML (ref 0.49–4.67)

## 2023-11-15 RX ORDER — LEVOTHYROXINE SODIUM 0.07 MG/1
TABLET ORAL
Qty: 90 TABLET | Refills: 0 | Status: SHIPPED | OUTPATIENT
Start: 2023-11-15 | End: 2023-11-19 | Stop reason: ALTCHOICE

## 2023-11-19 ENCOUNTER — TELEPHONE (OUTPATIENT)
Dept: FAMILY MEDICINE CLINIC | Age: 88
End: 2023-11-19

## 2023-11-19 DIAGNOSIS — E03.9 ACQUIRED HYPOTHYROIDISM: ICD-10-CM

## 2023-11-19 RX ORDER — LEVOTHYROXINE SODIUM 0.05 MG/1
50 TABLET ORAL DAILY
Qty: 90 TABLET | Refills: 0 | Status: SHIPPED | OUTPATIENT
Start: 2023-11-19

## 2023-11-19 NOTE — TELEPHONE ENCOUNTER
Please let Osmin Michaels know that Her thyroid labs show the dose of the thyroid medications needs adjusted. I have sent in a new prescription to the local pharmacy for the patient. Would like a recheck of the thyroid levels in 2- 3 months and can send in a longer prescription at that time.

## 2023-11-20 ENCOUNTER — HOSPITAL ENCOUNTER (OUTPATIENT)
Dept: WOUND CARE | Age: 88
Discharge: HOME | End: 2023-11-21
Payer: MEDICARE

## 2023-11-20 VITALS
WEIGHT: 164.6 LBS | RESPIRATION RATE: 18 BRPM | HEIGHT: 64 IN | BODY MASS INDEX: 28.1 KG/M2 | TEMPERATURE: 99.8 F | HEART RATE: 82 BPM | DIASTOLIC BLOOD PRESSURE: 80 MMHG | SYSTOLIC BLOOD PRESSURE: 140 MMHG

## 2023-11-20 DIAGNOSIS — L97.521 SKIN ULCER OF TOE OF LEFT FOOT, LIMITED TO BREAKDOWN OF SKIN (HCC): Primary | ICD-10-CM

## 2023-11-20 PROCEDURE — 97597 DBRDMT OPN WND 1ST 20 CM/<: CPT

## 2023-11-20 PROCEDURE — 97597 DBRDMT OPN WND 1ST 20 CM/<: CPT | Performed by: PODIATRIST

## 2023-11-20 RX ORDER — LIDOCAINE 50 MG/G
OINTMENT TOPICAL ONCE
OUTPATIENT
Start: 2023-11-20 | End: 2023-11-20

## 2023-11-20 RX ORDER — LIDOCAINE 40 MG/G
CREAM TOPICAL ONCE
OUTPATIENT
Start: 2023-11-20 | End: 2023-11-20

## 2023-11-20 RX ORDER — LIDOCAINE HYDROCHLORIDE 40 MG/ML
SOLUTION TOPICAL ONCE
OUTPATIENT
Start: 2023-11-20 | End: 2023-11-20

## 2023-11-20 RX ORDER — LIDOCAINE HYDROCHLORIDE 20 MG/ML
JELLY TOPICAL ONCE
OUTPATIENT
Start: 2023-11-20 | End: 2023-11-20

## 2023-11-20 RX ORDER — SODIUM CHLOR/HYPOCHLOROUS ACID 0.033 %
SOLUTION, IRRIGATION IRRIGATION ONCE
OUTPATIENT
Start: 2023-11-20 | End: 2023-11-20

## 2023-11-20 RX ORDER — TRIAMCINOLONE ACETONIDE 1 MG/G
OINTMENT TOPICAL ONCE
OUTPATIENT
Start: 2023-11-20 | End: 2023-11-20

## 2023-11-20 RX ORDER — BETAMETHASONE DIPROPIONATE 0.5 MG/G
CREAM TOPICAL ONCE
OUTPATIENT
Start: 2023-11-20 | End: 2023-11-20

## 2023-11-20 RX ORDER — GINSENG 100 MG
CAPSULE ORAL ONCE
OUTPATIENT
Start: 2023-11-20 | End: 2023-11-20

## 2023-11-20 RX ORDER — IBUPROFEN 200 MG
TABLET ORAL ONCE
OUTPATIENT
Start: 2023-11-20 | End: 2023-11-20

## 2023-11-20 RX ORDER — CLOBETASOL PROPIONATE 0.5 MG/G
OINTMENT TOPICAL ONCE
OUTPATIENT
Start: 2023-11-20 | End: 2023-11-20

## 2023-11-20 RX ORDER — GENTAMICIN SULFATE 1 MG/G
OINTMENT TOPICAL ONCE
OUTPATIENT
Start: 2023-11-20 | End: 2023-11-20

## 2023-11-20 RX ORDER — BACITRACIN ZINC AND POLYMYXIN B SULFATE 500; 1000 [USP'U]/G; [USP'U]/G
OINTMENT TOPICAL ONCE
OUTPATIENT
Start: 2023-11-20 | End: 2023-11-20

## 2023-11-20 ASSESSMENT — PAIN SCALES - GENERAL: PAINLEVEL_OUTOF10: 0

## 2023-11-20 NOTE — PROGRESS NOTES
Dressing/Treatment Other (comment) 11/13/23 0922   Offloading for Diabetic Foot Ulcers Forefoot offloading shoe 11/20/23 0950   Dressing Change Due 11/21/23 11/20/23 0950   Wound Length (cm) 0.5 cm 11/20/23 0950   Wound Width (cm) 0.2 cm 11/20/23 0950   Wound Depth (cm) 0.1 cm 11/20/23 0950   Wound Surface Area (cm^2) 0.1 cm^2 11/20/23 0950   Change in Wound Size % (l*w) 28.57 11/20/23 0950   Wound Volume (cm^3) 0.01 cm^3 11/20/23 0950   Wound Healing % 29 11/20/23 0950   Post-Procedure Length (cm) 0.4 cm 11/13/23 0922   Post-Procedure Width (cm) 0.9 cm 11/13/23 0922   Post-Procedure Depth (cm) 0.1 cm 11/13/23 0922   Post-Procedure Surface Area (cm^2) 0.36 cm^2 11/13/23 0922   Post-Procedure Volume (cm^3) 0.036 cm^3 11/13/23 0922   Wound Assessment Pink/red 11/20/23 0950   Drainage Amount Scant (moist but unmeasurable) 11/20/23 0950   Drainage Description Thin;Serosanguinous 11/20/23 0950   Odor None 11/20/23 0950   Erika-wound Assessment Hyperkeratosis (callous); Blanchable erythema 11/20/23 0950   Margins Undefined edges 11/20/23 0950   Wound Thickness Description not for Pressure Injury Partial thickness 11/20/23 0950   Number of days: 7         Asessment: Patient is a 80 y.o. female with:     Hospital Problems             Last Modified POA    DM type 2 with diabetic peripheral neuropathy ([address]) 11/20/2023 Yes    Skin ulcer of toe of left foot, limited to breakdown of skin ([address]) 11/20/2023 Yes       Ulcer Classification:  Diabetic ulcer grade 1 C. IDSA  no infection. Plan:   Patient examined and evaluated. Diabetic foot education and exam.  Current condition and treatment options discussed in detail. DM foot ed and exam  Continue offloading shoe at all times WB  Active wound management took place 100% non excisional with the use of  tissue nippers. All non viable tissue (including epidermis and/or dermis) and bio burden was removed to promote healing. Bleeding was present.   Please see chart for exact

## 2023-11-20 NOTE — PLAN OF CARE
Problem: Chronic Conditions and Co-morbidities  Goal: Patient's chronic conditions and co-morbidity symptoms are monitored and maintained or improved  Outcome: Progressing     Problem: Pain  Goal: Verbalizes/displays adequate comfort level or baseline comfort level  Outcome: Progressing  Flowsheets (Taken 11/20/2023 0230)  Verbalizes/displays adequate comfort level or baseline comfort level: Assess pain using appropriate pain scale     Problem: Cognitive:  Goal: Knowledge of wound care  Description: Knowledge of wound care  Outcome: Progressing  Goal: Understands risk factors for wounds  Description: Understands risk factors for wounds  Outcome: Progressing     Problem: Wound:  Goal: Will show signs of wound healing; wound closure and no evidence of infection  Description: Will show signs of wound healing; wound closure and no evidence of infection  Outcome: Progressing     Problem: Weight control:  Goal: Ability to maintain an optimal weight for height and age will be supported  Description: Ability to maintain an optimal weight for height and age will be supported  Outcome: Progressing     Problem: Falls - Risk of:  Goal: Will remain free from falls  Description: Will remain free from falls  Outcome: Progressing Render Risk Assessment In Note?: no Detail Level: Simple Comment: Patient may be experiencing psoriatic arthritis.

## 2023-11-20 NOTE — TELEPHONE ENCOUNTER
Attempted to contact patient- no answer - no option to leave message    (Patient also has xray result.)

## 2023-11-20 NOTE — PATIENT INSTRUCTIONS
Apply Silvadene cream to wound daily and cover. Wear off loading shoe. SIGNS OF INFECTION  - Redness, swelling, skin hot  - Wound bed turns black or stringy yellow  - Foul odor  - Increased drainage or pus  - Increased pain  - Fever greater than 100F    CALL YOUR DOCTOR OR SEEK MEDICAL ATTENTION IF SIGNS OF INFECTION.   DO NOT WAIT UNTIL YOUR NEXT APPOINTMENT    Call the Wound Care Nurse with any other questions or concerns- [phone]

## 2023-11-22 NOTE — TELEPHONE ENCOUNTER
Spoke with patient. Reviewed results. She said that she feels better than she was yesterday but still feels weak when she walks. She has UTI and is on Bactrim DS x 7 days.

## 2023-11-27 ENCOUNTER — OFFICE VISIT (OUTPATIENT)
Dept: FAMILY MEDICINE CLINIC | Age: 88
End: 2023-11-27

## 2023-11-27 ENCOUNTER — TELEPHONE (OUTPATIENT)
Dept: FAMILY MEDICINE CLINIC | Age: 88
End: 2023-11-27

## 2023-11-27 ENCOUNTER — HOSPITAL ENCOUNTER (OUTPATIENT)
Dept: WOUND CARE | Age: 88
Discharge: HOME | End: 2023-11-28
Payer: MEDICARE

## 2023-11-27 VITALS
SYSTOLIC BLOOD PRESSURE: 120 MMHG | HEIGHT: 64 IN | WEIGHT: 164.8 LBS | DIASTOLIC BLOOD PRESSURE: 70 MMHG | RESPIRATION RATE: 20 BRPM | TEMPERATURE: 97.6 F | BODY MASS INDEX: 28.13 KG/M2 | HEART RATE: 100 BPM

## 2023-11-27 VITALS
OXYGEN SATURATION: 97 % | DIASTOLIC BLOOD PRESSURE: 72 MMHG | WEIGHT: 164 LBS | BODY MASS INDEX: 28.14 KG/M2 | HEART RATE: 71 BPM | SYSTOLIC BLOOD PRESSURE: 124 MMHG

## 2023-11-27 DIAGNOSIS — A41.52 SEPSIS DUE TO PSEUDOMONAS SPECIES WITHOUT ACUTE ORGAN DYSFUNCTION (HCC): ICD-10-CM

## 2023-11-27 DIAGNOSIS — N30.00 ACUTE CYSTITIS WITHOUT HEMATURIA: ICD-10-CM

## 2023-11-27 DIAGNOSIS — L97.521 SKIN ULCER OF TOE OF LEFT FOOT, LIMITED TO BREAKDOWN OF SKIN (HCC): Primary | ICD-10-CM

## 2023-11-27 DIAGNOSIS — I21.4 NSTEMI (NON-ST ELEVATED MYOCARDIAL INFARCTION) (HCC): ICD-10-CM

## 2023-11-27 DIAGNOSIS — R26.89 BALANCE PROBLEM: ICD-10-CM

## 2023-11-27 DIAGNOSIS — L97.521 SKIN ULCER OF TOE OF LEFT FOOT, LIMITED TO BREAKDOWN OF SKIN (HCC): ICD-10-CM

## 2023-11-27 DIAGNOSIS — Z09 HOSPITAL DISCHARGE FOLLOW-UP: Primary | ICD-10-CM

## 2023-11-27 PROBLEM — N39.0 ACUTE URINARY TRACT INFECTION: Status: ACTIVE | Noted: 2023-11-23

## 2023-11-27 PROBLEM — A41.9 SEPSIS (HCC): Status: ACTIVE | Noted: 2023-11-23

## 2023-11-27 PROCEDURE — 99213 OFFICE O/P EST LOW 20 MIN: CPT

## 2023-11-27 PROCEDURE — 99213 OFFICE O/P EST LOW 20 MIN: CPT | Performed by: PODIATRIST

## 2023-11-27 RX ORDER — LIDOCAINE HYDROCHLORIDE 40 MG/ML
SOLUTION TOPICAL ONCE
Status: CANCELLED | OUTPATIENT
Start: 2023-11-27 | End: 2023-11-27

## 2023-11-27 RX ORDER — GENTAMICIN SULFATE 1 MG/G
OINTMENT TOPICAL ONCE
Status: CANCELLED | OUTPATIENT
Start: 2023-11-27 | End: 2023-11-27

## 2023-11-27 RX ORDER — TRIAMCINOLONE ACETONIDE 1 MG/G
OINTMENT TOPICAL ONCE
Status: CANCELLED | OUTPATIENT
Start: 2023-11-27 | End: 2023-11-27

## 2023-11-27 RX ORDER — IBUPROFEN 200 MG
TABLET ORAL ONCE
Status: CANCELLED | OUTPATIENT
Start: 2023-11-27 | End: 2023-11-27

## 2023-11-27 RX ORDER — CLOBETASOL PROPIONATE 0.5 MG/G
OINTMENT TOPICAL ONCE
Status: CANCELLED | OUTPATIENT
Start: 2023-11-27 | End: 2023-11-27

## 2023-11-27 RX ORDER — LIDOCAINE 40 MG/G
CREAM TOPICAL ONCE
Status: CANCELLED | OUTPATIENT
Start: 2023-11-27 | End: 2023-11-27

## 2023-11-27 RX ORDER — LIDOCAINE HYDROCHLORIDE 20 MG/ML
JELLY TOPICAL ONCE
Status: CANCELLED | OUTPATIENT
Start: 2023-11-27 | End: 2023-11-27

## 2023-11-27 RX ORDER — GINSENG 100 MG
CAPSULE ORAL ONCE
Status: CANCELLED | OUTPATIENT
Start: 2023-11-27 | End: 2023-11-27

## 2023-11-27 RX ORDER — SODIUM CHLOR/HYPOCHLOROUS ACID 0.033 %
SOLUTION, IRRIGATION IRRIGATION ONCE
Status: CANCELLED | OUTPATIENT
Start: 2023-11-27 | End: 2023-11-27

## 2023-11-27 RX ORDER — BETAMETHASONE DIPROPIONATE 0.5 MG/G
CREAM TOPICAL ONCE
Status: CANCELLED | OUTPATIENT
Start: 2023-11-27 | End: 2023-11-27

## 2023-11-27 RX ORDER — LIDOCAINE 50 MG/G
OINTMENT TOPICAL ONCE
Status: CANCELLED | OUTPATIENT
Start: 2023-11-27 | End: 2023-11-27

## 2023-11-27 RX ORDER — BACITRACIN ZINC AND POLYMYXIN B SULFATE 500; 1000 [USP'U]/G; [USP'U]/G
OINTMENT TOPICAL ONCE
Status: CANCELLED | OUTPATIENT
Start: 2023-11-27 | End: 2023-11-27

## 2023-11-27 RX ORDER — LEVOFLOXACIN 750 MG/1
TABLET, FILM COATED ORAL
COMMUNITY
Start: 2023-11-25

## 2023-11-27 NOTE — PROGRESS NOTES
Post-Discharge Transitional Care Follow Up    Agus Wheeler   YOB: 1935    Date of Office Visit:  11/27/2023  Date of Hospital Admission: 11/23/23  Date of Hospital Discharge: 11/25/23    Care management risk score Rising risk (score 2-5) and Complex Care (Scores >=6): No Risk Score On File     Non face to face  following discharge, date last encounter closed (first attempt may have been earlier): 11/27/2023     Call initiated 2 business days of discharge: Yes    ASSESSMENT/PLAN:   Hospital discharge follow-up  -     OK DISCHARGE MEDS RECONCILED W/ CURRENT OUTPATIENT MED LIST  NSTEMI (non-ST elevated myocardial infarction) ([address])  -     DME Order for Rita Munoz as OP  Balance problem  -     [employer]. Devices (WALKER AUTO GLIDES) MISC; DAILY Starting Mon 11/27/2023, Disp-1 each, R-0, Print  -     DME Order for Rita Munoz as OP  Sepsis due to Pseudomonas species without acute organ dysfunction ([address])  Skin ulcer of toe of left foot, limited to breakdown of skin ([address])  Acute cystitis without hematuria    Refer for physical therapy- encouraged to use the walker all the time until her strength improves. Continue to make sure no pressure on the toe and follow up closely with podiatry. Watch for symptoms recurrence  of the UTI. Medical Decision Making: moderate complexity  Return for As scheduled. Subjective:   HPI  Was seen in the urgent care and then sent to the ER with significant UTI anc concerns for dehydration-- ER given insulin 7 units-- sent home on po bactrim. Continued to feel poorly and had a fall on [place] PM-- was unable to get up so slept on the floor overnight-- was still unable to get up so called the squad. Came back to [facility] and admitted with UTI-- urine and blood cultures grew pseudomonas-- changed to levaquin. Discharged improved on 7/25/2023. Inpatient course: Discharge summary reviewed- see chart.     Interval history/Current

## 2023-11-27 NOTE — PROGRESS NOTES
Subjective:    Chuck Howard is a 80 y.o. female who presents with the ulceration of the L toe. Patient has been compliant with off loading. No pain. Dressings changed as advised. Currently denies F/C/N/V. Allergies   Allergen Reactions    Exenatide Other (See Comments)     Other reaction(s): stomach upset    Glucophage  [Metformin Hcl] Other (See Comments)    Hydromorphone     Invokana [Canagliflozin]      yeast    Lipitor  [Atorvastatin Calcium] Other (See Comments)    Other      novotwist    Glucophage [Metformin Hcl] Nausea And Vomiting    Ketorolac Nausea And Vomiting    Metformin      Other reaction(s): Intolerance-unknown  Other reaction(s): Nausea And Vomiting       Past Medical History:   Diagnosis Date    CAD (coronary artery disease)     Depression     Hypertension     Hypothyroidism     Major depressive disorder with single episode, in partial remission ([address]) 10/14/2018    Neuropathy     Neuropathy     Shingles 05/2017    Sleep apnea     CPAP    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 Authorizing Provider   levothyroxine (SYNTHROID) 50 MCG tablet Take 1 tablet by mouth daily 11/19/23   Hyun Nixon MD   silver sulfADIAZINE (SILVADENE) 1 % cream Apply topically daily. 11/13/23   Ridge Machado DPM   fluocinonide (LIDEX) 0.05 % cream Apply topically 2 times daily.  11/9/23   Hyun Nixon MD   oxybutynin (DITROPAN XL) 15 MG extended release tablet Take 1 tablet by mouth once daily 11/6/23   Hyun Nixon MD   DULoxetine (CYMBALTA) 60 MG extended release capsule Take 1 capsule by mouth once daily 10/3/23   Hyun Nixon MD   rosuvastatin (CRESTOR) 5 MG tablet TAKE 4 TABLETS BY MOUTH NIGHTLY FOR 30 DAYS 9/19/23   Provider,

## 2023-11-27 NOTE — PLAN OF CARE
Problem: Chronic Conditions and Co-morbidities  Goal: Patient's chronic conditions and co-morbidity symptoms are monitored and maintained or improved  11/27/2023 0942 by Jazmin Cervantes RN  Outcome: Completed  11/27/2023 0928 by Jazmin Cervantes RN  Outcome: Progressing     Problem: Cognitive:  Goal: Knowledge of wound care  Description: Knowledge of wound care  11/27/2023 0942 by Jazmin Cervantes RN  Outcome: Completed  11/27/2023 0928 by Jazmin Cervantes RN  Outcome: Progressing  Goal: Understands risk factors for wounds  Description: Understands risk factors for wounds  11/27/2023 0942 by Jazmin Cervantes RN  Outcome: Completed  11/27/2023 0928 by Jazmin Cervantes RN  Outcome: Progressing     Problem: Wound:  Goal: Will show signs of wound healing; wound closure and no evidence of infection  Description: Will show signs of wound healing; wound closure and no evidence of infection  11/27/2023 0942 by Jazmin Cervantes RN  Outcome: Completed  11/27/2023 0928 by Jazmin Cervantes RN  Outcome: Progressing     Problem: Falls - Risk of:  Goal: Will remain free from falls  Description: Will remain free from falls  11/27/2023 0942 by Jazmin Cervantes RN  Outcome: Completed  11/27/2023 0928 by Jazmin Cervantes RN  Outcome: Progressing     Problem: Blood Glucose:  Goal: Ability to maintain appropriate glucose levels will improve  Description: Ability to maintain appropriate glucose levels will improve  11/27/2023 0942 by Jazmin Cervantes RN  Outcome: Completed  11/27/2023 0928 by Jazmin Cervantes RN  Outcome: Progressing

## 2023-11-27 NOTE — DISCHARGE INSTRUCTIONS
Follow up as needed in wound care. In Jan follow up with Dr. Dwayne Gonzales in podiatry for callous care. Wound is healed. SIGNS OF INFECTION  - Redness, swelling, skin hot  - Wound bed turns black or stringy yellow  - Foul odor  - Increased drainage or pus  - Increased pain  - Fever greater than 100F    CALL YOUR DOCTOR OR SEEK MEDICAL ATTENTION IF SIGNS OF INFECTION.   DO NOT WAIT UNTIL YOUR NEXT APPOINTMENT    Call the Wound Care Nurse with any other questions or concerns- [phone]

## 2023-11-27 NOTE — TELEPHONE ENCOUNTER
Care Transitions Initial Follow Up Call    Outreach made within 2 business days of discharge: Yes    Patient: Wen Dk Patient : 1935   MRN: [id number]  Reason for Admission: No discharge information exists for this patient. Discharge Date:         Spoke with: patient    Discharge department/facility: [facility] Patient Contact:  Was patient able to fill all prescriptions: Yes  Was patient instructed to bring all medications to the follow-up visit: Yes  Is patient taking all medications as directed in the discharge summary?  Yes  Does patient understand their discharge instructions: Yes  Does patient have questions or concerns that need addressed prior to 7-14 day follow up office visit: no    Scheduled appointment with PCP within 7-14 days    Follow Up  Future Appointments   Date Time Provider [address] [place] [address]   2023  1:20 PM Jae Soto MD [facility]   2024 10:00 AM FREEMAN Tobias DP   3/7/2024 11:00 AM Cinthia Nixon MD [facility]       Chay Mario MA

## 2023-12-05 ENCOUNTER — TELEPHONE (OUTPATIENT)
Dept: FAMILY MEDICINE CLINIC | Age: 88
End: 2023-12-05
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
## 2023-12-05 DIAGNOSIS — R42 DIZZINESS: ICD-10-CM

## 2023-12-05 NOTE — TELEPHONE ENCOUNTER
I do not believe Nando Mccartney had a head CT as she fell AFTER she went home. I have put in the order for a head CT and it is quite possible she may have a concussion.  If increasing sx then should be reevaluated in the office-- usually will get better over the course of several weeks if the Head CT is OK>

## 2023-12-05 NOTE — TELEPHONE ENCOUNTER
Aura Jose for [facility] PT saw pt today 12/5. Pt told Aura Jose about her recent falls and that she was seen at the ER and here in our office. Pt mentioned to PT that she is still very nauseas, has blurred vision, and has headaches. While PT did their evaluation Aura Jose states she was having difficulty getting her eyes to focus to read and when she goes to stand up she is very wobbly along as when standing up. PT is concerned that she may have a concussion due to her two recent falls with hitting her head.

## 2023-12-06 NOTE — TELEPHONE ENCOUNTER
Patient notified by phone - she said the worse symptoms she is having is headaches. CT scheduled- she will call with any increasing symptoms.

## 2023-12-11 ENCOUNTER — TELEPHONE (OUTPATIENT)
Dept: FAMILY MEDICINE CLINIC | Age: 88
End: 2023-12-11

## 2023-12-12 ENCOUNTER — TELEPHONE (OUTPATIENT)
Dept: FAMILY MEDICINE CLINIC | Age: 88
End: 2023-12-12

## 2023-12-12 NOTE — TELEPHONE ENCOUNTER
stopped by and wanted you to know that [facility] [address] [facility]

## 2024-01-02 ENCOUNTER — OFFICE VISIT (OUTPATIENT)
Dept: FAMILY MEDICINE CLINIC | Age: 89
End: 2024-01-02

## 2024-01-02 VITALS
HEART RATE: 88 BPM | SYSTOLIC BLOOD PRESSURE: 120 MMHG | OXYGEN SATURATION: 95 % | WEIGHT: 160 LBS | DIASTOLIC BLOOD PRESSURE: 62 MMHG | BODY MASS INDEX: 27.45 KG/M2

## 2024-01-02 DIAGNOSIS — Z79.4 TYPE 2 DIABETES MELLITUS WITH DIABETIC POLYNEUROPATHY, WITH LONG-TERM CURRENT USE OF INSULIN (HCC): ICD-10-CM

## 2024-01-02 DIAGNOSIS — Z79.4 TYPE 2 DIABETES MELLITUS WITH HYPERGLYCEMIA, WITH LONG-TERM CURRENT USE OF INSULIN (HCC): ICD-10-CM

## 2024-01-02 DIAGNOSIS — Z09 HOSPITAL DISCHARGE FOLLOW-UP: Primary | ICD-10-CM

## 2024-01-02 DIAGNOSIS — D18.02 BRAIN HEMANGIOMA (HCC): ICD-10-CM

## 2024-01-02 DIAGNOSIS — E11.65 TYPE 2 DIABETES MELLITUS WITH HYPERGLYCEMIA, WITH LONG-TERM CURRENT USE OF INSULIN (HCC): ICD-10-CM

## 2024-01-02 DIAGNOSIS — S06.36AA TRAUMATIC INTRACEREBRAL HEMORRHAGE WITH UNKNOWN LOSS OF CONSCIOUSNESS STATUS, UNSPECIFIED LATERALITY, INITIAL ENCOUNTER (HCC): ICD-10-CM

## 2024-01-02 DIAGNOSIS — E11.42 TYPE 2 DIABETES MELLITUS WITH DIABETIC POLYNEUROPATHY, WITH LONG-TERM CURRENT USE OF INSULIN (HCC): ICD-10-CM

## 2024-01-02 RX ORDER — FERROUS SULFATE 325(65) MG
1 TABLET ORAL
COMMUNITY
Start: 2023-12-19

## 2024-01-02 RX ORDER — INSULIN GLARGINE 100 [IU]/ML
14 INJECTION, SOLUTION SUBCUTANEOUS NIGHTLY
COMMUNITY
End: 2024-01-02 | Stop reason: SINTOL

## 2024-01-02 RX ORDER — INSULIN HUMAN 500 [IU]/ML
60 INJECTION, SOLUTION SUBCUTANEOUS
Qty: 20 ML | Refills: 1
Start: 2024-01-02

## 2024-01-02 ASSESSMENT — PATIENT HEALTH QUESTIONNAIRE - PHQ9
SUM OF ALL RESPONSES TO PHQ QUESTIONS 1-9: 1
SUM OF ALL RESPONSES TO PHQ QUESTIONS 1-9: 1
2. FEELING DOWN, DEPRESSED OR HOPELESS: 0
6. FEELING BAD ABOUT YOURSELF - OR THAT YOU ARE A FAILURE OR HAVE LET YOURSELF OR YOUR FAMILY DOWN: 0
9. THOUGHTS THAT YOU WOULD BE BETTER OFF DEAD, OR OF HURTING YOURSELF: 0
3. TROUBLE FALLING OR STAYING ASLEEP: 0
5. POOR APPETITE OR OVEREATING: 0
SUM OF ALL RESPONSES TO PHQ9 QUESTIONS 1 & 2: 0
SUM OF ALL RESPONSES TO PHQ QUESTIONS 1-9: 1
8. MOVING OR SPEAKING SO SLOWLY THAT OTHER PEOPLE COULD HAVE NOTICED. OR THE OPPOSITE, BEING SO FIGETY OR RESTLESS THAT YOU HAVE BEEN MOVING AROUND A LOT MORE THAN USUAL: 0
1. LITTLE INTEREST OR PLEASURE IN DOING THINGS: 0
4. FEELING TIRED OR HAVING LITTLE ENERGY: 1
7. TROUBLE CONCENTRATING ON THINGS, SUCH AS READING THE NEWSPAPER OR WATCHING TELEVISION: 0
SUM OF ALL RESPONSES TO PHQ QUESTIONS 1-9: 1

## 2024-01-02 ASSESSMENT — ENCOUNTER SYMPTOMS
DIARRHEA: 0
VOMITING: 0
COLOR CHANGE: 0
NAUSEA: 0
EYE DISCHARGE: 0
SORE THROAT: 0
ABDOMINAL PAIN: 0
SHORTNESS OF BREATH: 0
COUGH: 0

## 2024-01-02 NOTE — PROGRESS NOTES
Post-Discharge Transitional Care Follow Up    Liv Ron   YOB: 1935    Date of Office Visit:  1/2/2024  Date of Hospital Admission: 12/8/23 - Initially admitted to [facility], transferred to [facility]  Date of Hospital Discharge: 12/12/23, discharged to SNF  SNF Discharge: 12/28/23 from [facility]. Living at home with .    Care management risk score Rising risk (score 2-5) and Complex Care (Scores >=6): No Risk Score On File     Non face to face  following discharge, date last encounter closed (first attempt may have been earlier): 12/19/2023     Call initiated 2 business days of discharge: Yes    ASSESSMENT/PLAN:   Hospital discharge follow-up  -     CO DISCHARGE MEDS RECONCILED W/ CURRENT OUTPATIENT MED LIST  Type 2 diabetes mellitus with hyperglycemia, with long-term current use of insulin ([facility])  Brain hemangioma ([facility])  -     MRI BRAIN W WO CONTRAST; Future  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         CGM report downloaded and reviewed from the past 2 weeks scanned to media tab. Time in range 25%, 66% hyperglycemia, and hypoglycemia 9%.  Predicted A1c per CGM report 8.6% and average glucose 219 mg/dl.  Management decisions were based on the CGM report.    Type 2 diabetes - patient having significant hypoglycemia events overnight, multiple readings in 50s.  CGM data reviewed as above.  Will discontinue patient's Lantus 14 units daily.  Patient to continue with her Humalin 60 units 3 times daily with meals. Patient also to follow up with

## 2024-01-09 ENCOUNTER — TELEPHONE (OUTPATIENT)
Dept: FAMILY MEDICINE CLINIC | Age: 89
End: 2024-01-09

## 2024-01-09 DIAGNOSIS — Z79.4 TYPE 2 DIABETES MELLITUS WITH OTHER CIRCULATORY COMPLICATION, WITH LONG-TERM CURRENT USE OF INSULIN (HCC): Primary | ICD-10-CM

## 2024-01-09 DIAGNOSIS — Z79.4 TYPE 2 DIABETES MELLITUS WITH DIABETIC POLYNEUROPATHY, WITH LONG-TERM CURRENT USE OF INSULIN (HCC): ICD-10-CM

## 2024-01-09 DIAGNOSIS — E11.42 TYPE 2 DIABETES MELLITUS WITH DIABETIC POLYNEUROPATHY, WITH LONG-TERM CURRENT USE OF INSULIN (HCC): ICD-10-CM

## 2024-01-09 DIAGNOSIS — E11.59 TYPE 2 DIABETES MELLITUS WITH OTHER CIRCULATORY COMPLICATION, WITH LONG-TERM CURRENT USE OF INSULIN (HCC): Primary | ICD-10-CM

## 2024-01-09 RX ORDER — INSULIN HUMAN 500 [IU]/ML
50 INJECTION, SOLUTION SUBCUTANEOUS
Qty: 20 ML | Refills: 1
Start: 2024-01-09

## 2024-01-10 ENCOUNTER — TELEPHONE (OUTPATIENT)
Dept: FAMILY MEDICINE CLINIC | Age: 89
End: 2024-01-10

## 2024-01-10 LAB
ANION GAP SERPL CALCULATED.3IONS-SCNC: 10.5 MMOL/L (ref 12–16)
BUN BLDV-MCNC: 12 MG/DL (ref 7–17)
CALCIUM SERPL-MCNC: 9.3 MG/DL (ref 8.4–10.2)
CHLORIDE BLD-SCNC: 99 MMOL/L (ref 98–120)
CO2: 32 MMOL/L (ref 22–31)
CREAT SERPL-MCNC: 0.5 MG/DL (ref 0.5–1)
GFR CALCULATED: > 60
GLUCOSE: 248 MG/DL (ref 65–105)
POTASSIUM SERPL-SCNC: 4.5 MMOL/L (ref 3.6–5)
SODIUM BLD-SCNC: 137 MMOL/L (ref 135–145)

## 2024-01-10 NOTE — TELEPHONE ENCOUNTER
----- Message from Michaela Cantu LPN sent at 1/2/2024 11:09 AM EST -----  Call and check if any low sugars

## 2024-01-13 NOTE — TELEPHONE ENCOUNTER
CGM report downloaded and reviewed from the past 2 weeks scanned to media tab. Time in range 22%, 70% hyperglycemia, and hypoglycemia 8%.  Predicted A1c per CGM report 8.8% and average glucose over 200 mg/dl.   Long acting insulin was discontinued and should have a small protein snack before bedtime.  Advised to contact endocrinology for further adjustment

## 2024-01-15 DIAGNOSIS — I25.10 ATHEROSCLEROSIS OF NATIVE CORONARY ARTERY OF NATIVE HEART WITHOUT ANGINA PECTORIS: Primary | ICD-10-CM

## 2024-01-15 RX ORDER — CLOPIDOGREL BISULFATE 75 MG/1
75 TABLET ORAL DAILY
Qty: 90 TABLET | Refills: 1
Start: 2024-01-15

## 2024-01-15 NOTE — PROGRESS NOTES
Spoke with Dr. Moulton and clarified the inpatient cardiology neuro notes-- they sill stop the aspirin and restart on the plavix alone.

## 2024-01-19 ENCOUNTER — HOSPITAL ENCOUNTER (OUTPATIENT)
Dept: MRI IMAGING | Age: 89
End: 2024-01-19
Payer: MEDICARE

## 2024-01-19 DIAGNOSIS — D18.02 BRAIN HEMANGIOMA (HCC): ICD-10-CM

## 2024-01-19 DIAGNOSIS — S06.36AA TRAUMATIC INTRACEREBRAL HEMORRHAGE WITH UNKNOWN LOSS OF CONSCIOUSNESS STATUS, UNSPECIFIED LATERALITY, INITIAL ENCOUNTER (HCC): ICD-10-CM

## 2024-01-19 PROCEDURE — 70553 MRI BRAIN STEM W/O & W/DYE: CPT

## 2024-01-19 PROCEDURE — A9579 GAD-BASE MR CONTRAST NOS,1ML: HCPCS | Performed by: STUDENT IN AN ORGANIZED HEALTH CARE EDUCATION/TRAINING PROGRAM

## 2024-01-19 PROCEDURE — 6360000004 HC RX CONTRAST MEDICATION: Performed by: STUDENT IN AN ORGANIZED HEALTH CARE EDUCATION/TRAINING PROGRAM

## 2024-01-19 RX ADMIN — GADOTERIDOL 15 ML: 279.3 INJECTION, SOLUTION INTRAVENOUS at 10:53

## 2024-01-23 ENCOUNTER — TELEPHONE (OUTPATIENT)
Dept: FAMILY MEDICINE CLINIC | Age: 89
End: 2024-01-23

## 2024-01-23 NOTE — TELEPHONE ENCOUNTER
Maureen Physical therapist wanted you to be aware that patient lost her balance and fell on 1/19/24.  No injuries.  She is still having balance issues.  Maureen would like a copy of MRI results faxed to them once you get them.  Fax# [phone].

## 2024-01-24 NOTE — TELEPHONE ENCOUNTER
NOted, results are back and no acute findings. Small frontal hemangioma but should not be causing patient's balance issues.

## 2024-01-24 NOTE — TELEPHONE ENCOUNTER
Spoke with patient - she states [place] come in the home to work with her twice a week. She does not feel like her balance is getting any better. She said the fall was very minor and she did not hurt herself at all.

## 2024-01-31 ENCOUNTER — OFFICE VISIT (OUTPATIENT)
Dept: FAMILY MEDICINE CLINIC | Age: 89
End: 2024-01-31
Payer: MEDICARE

## 2024-01-31 VITALS
WEIGHT: 155 LBS | SYSTOLIC BLOOD PRESSURE: 124 MMHG | OXYGEN SATURATION: 95 % | DIASTOLIC BLOOD PRESSURE: 64 MMHG | HEART RATE: 68 BPM | BODY MASS INDEX: 26.59 KG/M2

## 2024-01-31 DIAGNOSIS — E11.42 TYPE 2 DIABETES MELLITUS WITH DIABETIC POLYNEUROPATHY, WITH LONG-TERM CURRENT USE OF INSULIN (HCC): Primary | ICD-10-CM

## 2024-01-31 DIAGNOSIS — N32.81 OVERACTIVE BLADDER: ICD-10-CM

## 2024-01-31 DIAGNOSIS — Z79.4 TYPE 2 DIABETES MELLITUS WITH DIABETIC POLYNEUROPATHY, WITH LONG-TERM CURRENT USE OF INSULIN (HCC): Primary | ICD-10-CM

## 2024-01-31 DIAGNOSIS — I25.10 ATHEROSCLEROSIS OF NATIVE CORONARY ARTERY OF NATIVE HEART WITHOUT ANGINA PECTORIS: ICD-10-CM

## 2024-01-31 DIAGNOSIS — F32.4 MAJOR DEPRESSIVE DISORDER WITH SINGLE EPISODE, IN PARTIAL REMISSION (HCC): ICD-10-CM

## 2024-01-31 DIAGNOSIS — E03.9 ACQUIRED HYPOTHYROIDISM: ICD-10-CM

## 2024-01-31 PROBLEM — L97.521 SKIN ULCER OF TOE OF LEFT FOOT, LIMITED TO BREAKDOWN OF SKIN (HCC): Status: RESOLVED | Noted: 2023-11-13 | Resolved: 2024-01-31

## 2024-01-31 PROCEDURE — 99213 OFFICE O/P EST LOW 20 MIN: CPT | Performed by: FAMILY MEDICINE

## 2024-01-31 RX ORDER — METOPROLOL SUCCINATE 50 MG/1
50 TABLET, EXTENDED RELEASE ORAL
COMMUNITY
Start: 2024-01-23

## 2024-01-31 RX ORDER — PRAVASTATIN SODIUM 40 MG
40 TABLET ORAL DAILY
COMMUNITY
End: 2024-01-31 | Stop reason: ALTCHOICE

## 2024-01-31 RX ORDER — INSULIN HUMAN 500 [IU]/ML
INJECTION, SOLUTION SUBCUTANEOUS
Qty: 20 ML | Refills: 1
Start: 2024-01-31

## 2024-01-31 NOTE — PATIENT INSTRUCTIONS
Change the Insulin U500 50 with breakfast, 50 with lunch and 40 with supper until talking to endocrinology.    HOLD the oxybutynin until next visit to make sure this is not part of the dizziness.     Other medication changes as on the list.  Stop the pravastatin (CONTINUE on the Rosuvastatin), Stop the nebivolol (continue on the metoprolol 50 mg ONE twice daily) and STOP the levothyroxine 75 mcg (continue on the Levothyroxine 50 mcg daily)

## 2024-01-31 NOTE — PROGRESS NOTES
Invokana [Canagliflozin]      yeast    Lipitor  [Atorvastatin Calcium] Other (See Comments)    Other      novotwist    Glucophage [Metformin Hcl] Nausea And Vomiting    Ketorolac Nausea And Vomiting    Metformin      Other reaction(s): Intolerance-unknown  Other reaction(s): Nausea And Vomiting       Health Maintenance   Topic Date Due    Respiratory Syncytial Virus (RSV) Pregnant or age 60 yrs+ (1 - 1-dose 60+ series) Never done    DTaP/Tdap/Td vaccine (2 - Td or Tdap) 04/17/2023    Annual Wellness Visit (Medicare)  09/07/2024    Lipids  10/18/2024    Depression Monitoring  01/02/2025    Hepatitis B vaccine  Completed    Flu vaccine  Completed    Shingles vaccine  Completed    Pneumococcal 65+ years Vaccine  Completed    COVID-19 Vaccine  Completed    Hepatitis A vaccine  Aged Out    Hib vaccine  Aged Out    Polio vaccine  Aged Out    Meningococcal (ACWY) vaccine  Aged Out    DEXA (modify frequency per FRAX score)  Discontinued         Review Of Systems as in HPI    Objective:     /64 (Site: Right Upper Arm, Position: Sitting, Cuff Size: Medium Adult)   Pulse 68   Wt 70.3 kg (155 lb)   LMP 04/24/1987 (Approximate)   SpO2 95%   BMI 26.59 kg/m²     Physical Exam  Constitutional:       General: She is not in acute distress.  HENT:      Head: Normocephalic and atraumatic.      Right Ear: External ear normal.      Left Ear: External ear normal.      Mouth/Throat:      Mouth: Mucous membranes are moist.      Pharynx: No posterior oropharyngeal erythema.   Eyes:      Extraocular Movements: Extraocular movements intact.      Conjunctiva/sclera: Conjunctivae normal.   Cardiovascular:      Rate and Rhythm: Normal rate and regular rhythm.      Heart sounds: No murmur heard.  Pulmonary:      Effort: No respiratory distress.      Breath sounds: Normal breath sounds.   Abdominal:      General: There is no distension.      Tenderness: There is no abdominal tenderness.   Musculoskeletal:         General: No deformity.

## 2024-02-08 ENCOUNTER — OFFICE VISIT (OUTPATIENT)
Dept: FAMILY MEDICINE CLINIC | Age: 89
End: 2024-02-08
Payer: MEDICARE

## 2024-02-08 VITALS
HEART RATE: 87 BPM | BODY MASS INDEX: 26.59 KG/M2 | WEIGHT: 155 LBS | DIASTOLIC BLOOD PRESSURE: 70 MMHG | SYSTOLIC BLOOD PRESSURE: 126 MMHG | OXYGEN SATURATION: 99 %

## 2024-02-08 DIAGNOSIS — E11.42 DM TYPE 2 WITH DIABETIC PERIPHERAL NEUROPATHY (HCC): Primary | ICD-10-CM

## 2024-02-08 DIAGNOSIS — Z79.4 TYPE 2 DIABETES MELLITUS WITH DIABETIC POLYNEUROPATHY, WITH LONG-TERM CURRENT USE OF INSULIN (HCC): ICD-10-CM

## 2024-02-08 DIAGNOSIS — R42 DIZZINESS: ICD-10-CM

## 2024-02-08 DIAGNOSIS — E11.42 TYPE 2 DIABETES MELLITUS WITH DIABETIC POLYNEUROPATHY, WITH LONG-TERM CURRENT USE OF INSULIN (HCC): ICD-10-CM

## 2024-02-08 DIAGNOSIS — I25.10 ATHEROSCLEROSIS OF NATIVE CORONARY ARTERY OF NATIVE HEART WITHOUT ANGINA PECTORIS: ICD-10-CM

## 2024-02-08 DIAGNOSIS — R26.89 BALANCE PROBLEM: ICD-10-CM

## 2024-02-08 PROCEDURE — 99212 OFFICE O/P EST SF 10 MIN: CPT | Performed by: FAMILY MEDICINE

## 2024-02-08 RX ORDER — INSULIN HUMAN 500 [IU]/ML
INJECTION, SOLUTION SUBCUTANEOUS
Qty: 20 ML | Refills: 1
Start: 2024-02-08

## 2024-02-08 NOTE — PATIENT INSTRUCTIONS
Change to 70 units in the morning, 60 IF you eat lunch, 30 units at bedtime. If you do not eat then you can skip the insulin.  If you continue to have low sugars let me know.

## 2024-02-08 NOTE — PROGRESS NOTES
[facility] [place]  [address], [address]  [place] [id number]  Dept: [phone]  Dept Fax:[phone]    Liv Ron is a 88 y.o. female who presents today for her medical conditions/complaints as notedbelow.  Liv Ron is c/o of Diabetes Mellitus (1 week follow up meds - the only difference that she can see is that she is having accidents since d/c of the oxybutynin. She gets up every 1-1 and 1/2 hour at night. )      Assessment/Plan:     1. DM type 2 with diabetic peripheral neuropathy (HCC)  Monitor closely for low sugars.  Reviewed endocrinology notes    2. Type 2 diabetes mellitus with diabetic polyneuropathy, with long-term current use of insulin ([facility])  Patient Instructions   Change to 70 units in the morning, 60 IF you eat lunch, 30 units at bedtime. If you do not eat then you can skip the insulin.  If you continue to have low sugars let me know.     - insulin regular human (HUMULIN R) 500 UNIT/ML concentrated injection vial; Inject 70 Units into the skin daily (with breakfast) AND 60 Units Daily with lunch AND 30 Units Daily with supper.  Dispense: 20 mL; Refill: 1    3. Dizziness  Make sure following the PT recommendations when ambulating and rising.    4. Atherosclerosis of native coronary artery of native heart without angina pectoris  Asx at this time on risk factor modification.    5. Balance problem  Continue with the PT for strengthening and gait    Change to 70 units in the morning, 60 IF you eat lunch, 30 units at bedtime.     Hopefully this will help level out the therapy.      Consider the PT for the vertigo.     Lab Results   Component Value Date    WBC 9.2 12/07/2023    HGB 12.7 12/07/2023    HCT 41.8 12/07/2023    .1 12/07/2023    CHOL 138 10/18/2023    TRIG 107 10/18/2023    HDL 63 10/18/2023    ALT 16 12/07/2023    AST 25 12/07/2023     01/10/2024    K 4.5 01/10/2024    CL 99 01/10/2024    CREATININE 0.5 01/10/2024    BUN 12

## 2024-03-01 ENCOUNTER — OFFICE VISIT (OUTPATIENT)
Dept: FAMILY MEDICINE CLINIC | Age: 89
End: 2024-03-01
Payer: MEDICARE

## 2024-03-01 VITALS
OXYGEN SATURATION: 98 % | WEIGHT: 155 LBS | HEART RATE: 81 BPM | SYSTOLIC BLOOD PRESSURE: 126 MMHG | DIASTOLIC BLOOD PRESSURE: 74 MMHG | BODY MASS INDEX: 26.59 KG/M2

## 2024-03-01 DIAGNOSIS — H81.10 BENIGN PAROXYSMAL POSITIONAL VERTIGO, UNSPECIFIED LATERALITY: Primary | ICD-10-CM

## 2024-03-01 DIAGNOSIS — M17.12 ARTHRITIS OF LEFT KNEE: ICD-10-CM

## 2024-03-01 PROCEDURE — 20610 DRAIN/INJ JOINT/BURSA W/O US: CPT | Performed by: FAMILY MEDICINE

## 2024-03-01 PROCEDURE — 99212 OFFICE O/P EST SF 10 MIN: CPT | Performed by: FAMILY MEDICINE

## 2024-03-01 RX ORDER — TRIAMCINOLONE ACETONIDE 40 MG/ML
40 INJECTION, SUSPENSION INTRA-ARTICULAR; INTRAMUSCULAR ONCE
Status: COMPLETED | OUTPATIENT
Start: 2024-03-01 | End: 2024-03-01

## 2024-03-01 RX ORDER — BUPIVACAINE HYDROCHLORIDE 5 MG/ML
1 INJECTION, SOLUTION EPIDURAL; INTRACAUDAL ONCE
Status: COMPLETED | OUTPATIENT
Start: 2024-03-01 | End: 2024-03-01

## 2024-03-01 RX ORDER — LIDOCAINE HYDROCHLORIDE 10 MG/ML
1 INJECTION, SOLUTION EPIDURAL; INFILTRATION; INTRACAUDAL; PERINEURAL ONCE
Status: COMPLETED | OUTPATIENT
Start: 2024-03-01 | End: 2024-03-01

## 2024-03-01 RX ADMIN — LIDOCAINE HYDROCHLORIDE 1 ML: 10 INJECTION, SOLUTION EPIDURAL; INFILTRATION; INTRACAUDAL; PERINEURAL at 10:37

## 2024-03-01 RX ADMIN — TRIAMCINOLONE ACETONIDE 40 MG: 40 INJECTION, SUSPENSION INTRA-ARTICULAR; INTRAMUSCULAR at 10:36

## 2024-03-01 RX ADMIN — BUPIVACAINE HYDROCHLORIDE 5 MG: 5 INJECTION, SOLUTION EPIDURAL; INTRACAUDAL at 10:33

## 2024-03-01 ASSESSMENT — PAIN DESCRIPTION - LOCATION: LOCATION: KNEE

## 2024-03-01 ASSESSMENT — PAIN DESCRIPTION - ORIENTATION: ORIENTATION: LEFT

## 2024-03-01 NOTE — PROGRESS NOTES
[place] [address]  [place] [id number]  Dept: [phone]  Dept Fax:[phone]    Liv Ron is a 88 y.o. female who presents today for her medical conditions/complaints as notedbelow.  Liv Ron is c/o of Knee Pain (Pt states that she is having left knee pain. Pt states that the last month the pain has gotten worse. Pt states that she has received injections in her knees before and would like one today. ) and Dizziness (Pt states that she has noticed that she is having signs of vertigo. Pt states when she first lays down she gets dizzy and states it only last for only a minute or less. Pt states that if she looks up/tilts her head back she gets dizzy at times and that only last like a minute or less.  )        Assessment/Plan:     1. Benign paroxysmal positional vertigo, unspecified laterality  No evidence of central dysfunction.  Appears peripheral vertigo.  Referral to physical therapy for Epley's maneuvers and vestibular therapy.  - [facility] [place]    2. Arthritis of left knee  Continue with the home exercises from her recent physical therapy.  Knee injection today to help with symptom relief.  Tylenol as needed for pain  - lidocaine PF 1 % injection 1 mL  - triamcinolone acetonide (KENALOG-40) injection 40 mg  - BUPivacaine (PF) (MARCAINE) 0.5 % injection 5 mg        Lab Results   Component Value Date    WBC 9.2 12/07/2023    HGB 12.7 12/07/2023    HCT 41.8 12/07/2023    .1 12/07/2023    CHOL 138 10/18/2023    TRIG 107 10/18/2023    HDL 63 10/18/2023    ALT 16 12/07/2023    AST 25 12/07/2023     01/10/2024    K 4.5 01/10/2024    CL 99 01/10/2024    CREATININE 0.5 01/10/2024    BUN 12 01/10/2024    CO2 32 (H) 01/10/2024    TSH <0.02 (L) 11/15/2023    INR 1.1 09/16/2023    LABA1C 8.1 11/02/2023    LABA1C 9.8 09/07/2023    LABA1C 8.6 10/14/2022       Return for As scheduled.        Subjective:      HPI:

## 2024-03-12 ENCOUNTER — TELEPHONE (OUTPATIENT)
Dept: FAMILY MEDICINE CLINIC | Age: 89
End: 2024-03-12
Payer: MEDICARE

## 2024-03-12 DIAGNOSIS — E11.42 TYPE 2 DIABETES MELLITUS WITH DIABETIC POLYNEUROPATHY, WITH LONG-TERM CURRENT USE OF INSULIN (HCC): ICD-10-CM

## 2024-03-12 DIAGNOSIS — Z79.4 TYPE 2 DIABETES MELLITUS WITH DIABETIC POLYNEUROPATHY, WITH LONG-TERM CURRENT USE OF INSULIN (HCC): ICD-10-CM

## 2024-03-12 PROCEDURE — 95251 CONT GLUC MNTR ANALYSIS I&R: CPT | Performed by: FAMILY MEDICINE

## 2024-03-13 RX ORDER — INSULIN HUMAN 500 [IU]/ML
INJECTION, SOLUTION SUBCUTANEOUS
Qty: 20 ML | Refills: 1
Start: 2024-03-13

## 2024-03-13 NOTE — TELEPHONE ENCOUNTER
CGM report downloaded and reviewed from the past 2 weeks scanned to media tab. Time in range 35%, 54% hyperglycemia, and hypoglycemia 11%.  Predicted A1c per CGM report 7.9% and average glucose NA mg/dl.     Blanka is still having significant worrisome lows at night.  Please have her cut her supper insulin down to 10 units.  She can increase the am insulin to 80 units and the noon insulin to 65 units.  Please recheck this in 2 weeks.

## 2024-03-27 ENCOUNTER — OFFICE VISIT (OUTPATIENT)
Dept: FAMILY MEDICINE CLINIC | Age: 89
End: 2024-03-27
Payer: MEDICARE

## 2024-03-27 ENCOUNTER — TELEPHONE (OUTPATIENT)
Dept: FAMILY MEDICINE CLINIC | Age: 89
End: 2024-03-27

## 2024-03-27 VITALS
DIASTOLIC BLOOD PRESSURE: 74 MMHG | BODY MASS INDEX: 27.28 KG/M2 | HEART RATE: 87 BPM | OXYGEN SATURATION: 100 % | WEIGHT: 159 LBS | SYSTOLIC BLOOD PRESSURE: 126 MMHG

## 2024-03-27 DIAGNOSIS — Z79.4 TYPE 2 DIABETES MELLITUS WITH DIABETIC POLYNEUROPATHY, WITH LONG-TERM CURRENT USE OF INSULIN (HCC): Primary | ICD-10-CM

## 2024-03-27 DIAGNOSIS — K59.09 CHRONIC CONSTIPATION: ICD-10-CM

## 2024-03-27 DIAGNOSIS — E11.42 TYPE 2 DIABETES MELLITUS WITH DIABETIC POLYNEUROPATHY, WITH LONG-TERM CURRENT USE OF INSULIN (HCC): Primary | ICD-10-CM

## 2024-03-27 DIAGNOSIS — M17.12 ARTHRITIS OF LEFT KNEE: ICD-10-CM

## 2024-03-27 LAB — HBA1C MFR BLD: 8.6 %

## 2024-03-27 PROCEDURE — 99212 OFFICE O/P EST SF 10 MIN: CPT | Performed by: FAMILY MEDICINE

## 2024-03-27 PROCEDURE — 83036 HEMOGLOBIN GLYCOSYLATED A1C: CPT | Performed by: FAMILY MEDICINE

## 2024-03-27 SDOH — ECONOMIC STABILITY: FOOD INSECURITY: WITHIN THE PAST 12 MONTHS, YOU WORRIED THAT YOUR FOOD WOULD RUN OUT BEFORE YOU GOT MONEY TO BUY MORE.: NEVER TRUE

## 2024-03-27 SDOH — ECONOMIC STABILITY: FOOD INSECURITY: WITHIN THE PAST 12 MONTHS, THE FOOD YOU BOUGHT JUST DIDN'T LAST AND YOU DIDN'T HAVE MONEY TO GET MORE.: NEVER TRUE

## 2024-03-27 SDOH — ECONOMIC STABILITY: INCOME INSECURITY: HOW HARD IS IT FOR YOU TO PAY FOR THE VERY BASICS LIKE FOOD, HOUSING, MEDICAL CARE, AND HEATING?: NOT HARD AT ALL

## 2024-03-27 NOTE — PATIENT INSTRUCTIONS
Continue to get plenty of fluids and can increase the moreno probiotics to twice daily if needed.     Can also try the miralax in water or the citrucel fiber supplement as needed.       No changes in the insulin at this time.     Pain management referral for the knee pain.

## 2024-04-03 DIAGNOSIS — E03.9 ACQUIRED HYPOTHYROIDISM: ICD-10-CM

## 2024-04-04 LAB
T4 FREE: 1.06 NG/DL (ref 0.78–2.19)
TSH SERPL DL<=0.05 MIU/L-ACNC: <0.02 MIU/ML (ref 0.49–4.67)

## 2024-04-04 RX ORDER — LEVOTHYROXINE SODIUM 0.05 MG/1
50 TABLET ORAL DAILY
Qty: 90 TABLET | Refills: 3 | Status: SHIPPED | OUTPATIENT
Start: 2024-04-04 | End: 2024-04-05 | Stop reason: ALTCHOICE

## 2024-04-04 NOTE — TELEPHONE ENCOUNTER
Liv Ron is requesting a refill on the following medication(s):  Requested Prescriptions     Pending Prescriptions Disp Refills    levothyroxine (SYNTHROID) 50 MCG tablet [Pharmacy Med Name: Levothyroxine Sodium 50 MCG Oral Tablet] 90 tablet 0     Sig: Take 1 tablet by mouth once daily       Last Visit Date (If Applicable):  3/27/2024    Next Visit Date:    8/26/2024

## 2024-04-04 NOTE — TELEPHONE ENCOUNTER
An is due for a repeat check of her thyroid studies, please let her know.  These are ordered.  Script sent in but should have these checked as were out of range in December.

## 2024-04-05 ENCOUNTER — TELEPHONE (OUTPATIENT)
Dept: FAMILY MEDICINE CLINIC | Age: 89
End: 2024-04-05

## 2024-04-05 DIAGNOSIS — E03.9 ACQUIRED HYPOTHYROIDISM: Primary | ICD-10-CM

## 2024-04-05 PROBLEM — A41.9 SEPSIS (HCC): Status: RESOLVED | Noted: 2023-11-23 | Resolved: 2024-04-05

## 2024-04-05 PROBLEM — N39.0 ACUTE URINARY TRACT INFECTION: Status: RESOLVED | Noted: 2023-11-23 | Resolved: 2024-04-05

## 2024-04-05 PROBLEM — I25.2 HISTORY OF NON-ST ELEVATION MYOCARDIAL INFARCTION (NSTEMI): Status: ACTIVE | Noted: 2023-09-16

## 2024-04-16 ENCOUNTER — TELEPHONE (OUTPATIENT)
Dept: FAMILY MEDICINE CLINIC | Age: 89
End: 2024-04-16
Payer: MEDICARE

## 2024-04-16 DIAGNOSIS — E11.59 TYPE 2 DIABETES MELLITUS WITH OTHER CIRCULATORY COMPLICATION, WITH LONG-TERM CURRENT USE OF INSULIN (HCC): Primary | ICD-10-CM

## 2024-04-16 DIAGNOSIS — Z79.4 TYPE 2 DIABETES MELLITUS WITH OTHER CIRCULATORY COMPLICATION, WITH LONG-TERM CURRENT USE OF INSULIN (HCC): Primary | ICD-10-CM

## 2024-04-16 PROCEDURE — 95251 CONT GLUC MNTR ANALYSIS I&R: CPT | Performed by: FAMILY MEDICINE

## 2024-04-21 NOTE — TELEPHONE ENCOUNTER
CGM report downloaded and reviewed from the past 2 weeks scanned to media tab. Time in range 12%, 86% hyperglycemia, and hypoglycemia 2%.  Predicted A1c per CGM report 9.6% and average glucose 267 mg/dl. With the recent adjustments there are fewer lows but still had at least 1 day with severe overnight ;ow.  I would not change anything at this time but see when Blanka sees endocrine again.

## 2024-05-06 RX ORDER — DULOXETIN HYDROCHLORIDE 60 MG/1
CAPSULE, DELAYED RELEASE ORAL
Qty: 90 CAPSULE | Refills: 3 | Status: SHIPPED | OUTPATIENT
Start: 2024-05-06

## 2024-05-06 NOTE — TELEPHONE ENCOUNTER
Liv Ron is requesting a refill on the following medication(s):  Requested Prescriptions     Pending Prescriptions Disp Refills    DULoxetine (CYMBALTA) 60 MG extended release capsule [Pharmacy Med Name: DULoxetine HCl 60 MG Oral Capsule Delayed Release Particles] 90 capsule 3     Sig: Take 1 capsule by mouth once daily       Last Visit Date (If Applicable):  3/27/2024    Next Visit Date:    8/26/2024

## 2024-05-21 ENCOUNTER — TELEPHONE (OUTPATIENT)
Dept: FAMILY MEDICINE CLINIC | Age: 89
End: 2024-05-21

## 2024-05-21 DIAGNOSIS — E11.42 DM TYPE 2 WITH DIABETIC PERIPHERAL NEUROPATHY (HCC): Primary | ICD-10-CM

## 2024-05-22 NOTE — TELEPHONE ENCOUNTER
CGM report downloaded and reviewed from the past 2 weeks scanned to media tab. Time in range 27%, 67% hyperglycemia, and hypoglycemia 6%.  Predicted A1c per CGM report 8.5% and average glucose 180 mg/dl.     Still having nocturnal lows occasionally.  Would not increase the insulin based on this.  Make sure she is having the regular endocrine follow up.

## 2024-05-22 NOTE — TELEPHONE ENCOUNTER
Patient notified by phone. She had appointment with endo 5/3/24 and a follow up scheduled 8/8/24.  They told her that it could be because she wears the cgm on her left arm and she also sleeps on her left arm. Wondering If it gets pinched because she sleeps on that side. She was told to wear it on the right arm and see if that makes a difference.

## 2024-06-17 ENCOUNTER — TELEPHONE (OUTPATIENT)
Dept: FAMILY MEDICINE CLINIC | Age: 89
End: 2024-06-17

## 2024-06-17 RX ORDER — METOPROLOL SUCCINATE 50 MG/1
50 TABLET, EXTENDED RELEASE ORAL DAILY
Qty: 30 TABLET | Refills: 5 | Status: SHIPPED | OUTPATIENT
Start: 2024-06-17

## 2024-06-17 NOTE — TELEPHONE ENCOUNTER
Liv Ron is requesting a refill on the following medication(s):  Requested Prescriptions     Pending Prescriptions Disp Refills    metoprolol succinate (TOPROL XL) 50 MG extended release tablet 30 tablet 5     Sig: Take 1 tablet by mouth daily       Last Visit Date (If Applicable):  3/27/2024    Next Visit Date:    8/26/2024

## 2024-06-17 NOTE — TELEPHONE ENCOUNTER
Needs refill if she is supposed to continue taking.  Previously prescribed [facility] doctor [facility].

## 2024-06-25 ENCOUNTER — TELEPHONE (OUTPATIENT)
Dept: FAMILY MEDICINE CLINIC | Age: 89
End: 2024-06-25
Payer: MEDICARE

## 2024-06-25 DIAGNOSIS — Z79.4 TYPE 2 DIABETES MELLITUS WITH DIABETIC POLYNEUROPATHY, WITH LONG-TERM CURRENT USE OF INSULIN (HCC): ICD-10-CM

## 2024-06-25 DIAGNOSIS — E11.42 DM TYPE 2 WITH DIABETIC PERIPHERAL NEUROPATHY (HCC): Primary | ICD-10-CM

## 2024-06-25 DIAGNOSIS — E11.42 TYPE 2 DIABETES MELLITUS WITH DIABETIC POLYNEUROPATHY, WITH LONG-TERM CURRENT USE OF INSULIN (HCC): ICD-10-CM

## 2024-06-25 PROCEDURE — 95251 CONT GLUC MNTR ANALYSIS I&R: CPT | Performed by: FAMILY MEDICINE

## 2024-06-25 RX ORDER — INSULIN HUMAN 500 [IU]/ML
INJECTION, SOLUTION SUBCUTANEOUS
Qty: 20 ML | Refills: 1
Start: 2024-06-25

## 2024-06-25 NOTE — TELEPHONE ENCOUNTER
CGM report downloaded and reviewed from the past 2 weeks scanned to media tab. Time in range 26%, 68% hyperglycemia, and hypoglycemia 6%.  Predicted A1c per CGM report 8.3% and average glucose 210 mg/dl.     Lows uniformly in the early am and mornings.  Have her decrease her am insulin to 75 units and her lunch to 60 units.  May need to consider adding in Humalog at lunch and dinner if needed (short acting insulin)

## 2024-07-15 ENCOUNTER — OFFICE VISIT (OUTPATIENT)
Dept: FAMILY MEDICINE CLINIC | Age: 89
End: 2024-07-15
Payer: MEDICARE

## 2024-07-15 VITALS
OXYGEN SATURATION: 98 % | BODY MASS INDEX: 25.91 KG/M2 | HEART RATE: 88 BPM | DIASTOLIC BLOOD PRESSURE: 70 MMHG | WEIGHT: 151 LBS | SYSTOLIC BLOOD PRESSURE: 124 MMHG

## 2024-07-15 DIAGNOSIS — M20.41 HAMMER TOES OF BOTH FEET: ICD-10-CM

## 2024-07-15 DIAGNOSIS — Z95.5 STATUS POST INSERTION OF DRUG-ELUTING STENT INTO LEFT ANTERIOR DESCENDING (LAD) ARTERY: ICD-10-CM

## 2024-07-15 DIAGNOSIS — M17.12 ARTHRITIS OF LEFT KNEE: ICD-10-CM

## 2024-07-15 DIAGNOSIS — M20.42 HAMMER TOES OF BOTH FEET: ICD-10-CM

## 2024-07-15 DIAGNOSIS — E11.65 TYPE 2 DIABETES MELLITUS WITH HYPERGLYCEMIA, WITH LONG-TERM CURRENT USE OF INSULIN (HCC): Primary | ICD-10-CM

## 2024-07-15 DIAGNOSIS — Z79.4 TYPE 2 DIABETES MELLITUS WITH HYPERGLYCEMIA, WITH LONG-TERM CURRENT USE OF INSULIN (HCC): Primary | ICD-10-CM

## 2024-07-15 PROCEDURE — 99212 OFFICE O/P EST SF 10 MIN: CPT | Performed by: FAMILY MEDICINE

## 2024-07-15 NOTE — PATIENT INSTRUCTIONS
Call the orthopedist (Dr. Alvarez office) to schedule the visco-supplementation. (They should be getting the insurance approval)

## 2024-07-15 NOTE — PROGRESS NOTES
Went to the pain management and was unable to really get relief.  Saw Orthopedics and they recommend a knee replacement.     Is getting up every hour every night to go to the bathroom.      Ortho said to consider the viscosupplementation rather than replacement.  If not better with that would consider a replacement if not improved with the injections.  Waiting for the insurance authorization.     Can continue with the over the counter topical options.     Continue to work on the Blood sugars with the diet and exercise as tolerated.   
  Neurological:      General: No focal deficit present.      Mental Status: She is alert and oriented to person, place, and time.   Psychiatric:         Mood and Affect: Mood normal.         Behavior: Behavior normal.       Multiple labs and other testing may have been ordered which may not be completely evident from the above note due to system interface incompatibilities.     Patient given educational materials - see patientinstructions.  Discussed use, benefit, and side effects of prescribed medications.  All patient questions answered.  Pt voiced understanding. Reviewed health maintenance.  Instructed to continue current medications, diet andexercise.  Patient agreed with treatment plan. Follow up as directed.     (Please note that portions of this note were completed with a voice-recognition program. Efforts were made to edit the dictation but occasionally words are mis-transcribed.)    Electronically signed by Kenia Nixon MD on 7/21/2024

## 2024-07-29 ENCOUNTER — TELEPHONE (OUTPATIENT)
Dept: FAMILY MEDICINE CLINIC | Age: 89
End: 2024-07-29
Payer: MEDICARE

## 2024-07-29 DIAGNOSIS — E11.42 DM TYPE 2 WITH DIABETIC PERIPHERAL NEUROPATHY (HCC): Primary | ICD-10-CM

## 2024-07-29 PROCEDURE — 95251 CONT GLUC MNTR ANALYSIS I&R: CPT | Performed by: FAMILY MEDICINE

## 2024-08-05 NOTE — TELEPHONE ENCOUNTER
CGM report downloaded and reviewed from the past 2 weeks scanned to media tab. Time in range 12%, 84% hyperglycemia, and hypoglycemia 4%. With 2% low and 2% very low Predicted A1c per CGM report 9.8% and average glucose 272 mg/dl.      Please have the patient's endocrinologist call me.  THis is completely unacceptable.  Patient is at very high risk of serious complications from the severe lows.  Do not feel her current insulin regimen is appropriate.

## 2024-08-26 ENCOUNTER — TELEPHONE (OUTPATIENT)
Dept: FAMILY MEDICINE CLINIC | Age: 89
End: 2024-08-26

## 2024-08-26 NOTE — TELEPHONE ENCOUNTER
Dr Alvarez will not do surgery due to her A1C being too high and he told her to contact you regarding this.

## 2024-08-26 NOTE — TELEPHONE ENCOUNTER
I would recommend that this be handled by the endocrinologist.  The current insulin regimen is not working effectively and I would recommend that she possibly be tried on a different insulin regimen as cannot increase her current insulin due to the severe low sugars she has.  She should have a visit with the endocrinologist and should be scheduled if possible with the PHYSICIAN at the endocrinologist to assess.

## 2024-09-03 ENCOUNTER — TELEPHONE (OUTPATIENT)
Dept: FAMILY MEDICINE CLINIC | Age: 89
End: 2024-09-03

## 2024-09-03 DIAGNOSIS — E11.42 DM TYPE 2 WITH DIABETIC PERIPHERAL NEUROPATHY (HCC): Primary | ICD-10-CM

## 2024-09-19 ENCOUNTER — TELEPHONE (OUTPATIENT)
Dept: FAMILY MEDICINE CLINIC | Age: 89
End: 2024-09-19

## 2024-09-19 ENCOUNTER — OFFICE VISIT (OUTPATIENT)
Dept: FAMILY MEDICINE CLINIC | Age: 89
End: 2024-09-19
Payer: MEDICARE

## 2024-09-19 VITALS
HEIGHT: 64 IN | SYSTOLIC BLOOD PRESSURE: 126 MMHG | OXYGEN SATURATION: 96 % | HEART RATE: 98 BPM | WEIGHT: 153 LBS | DIASTOLIC BLOOD PRESSURE: 62 MMHG | BODY MASS INDEX: 26.12 KG/M2

## 2024-09-19 DIAGNOSIS — E11.42 DM TYPE 2 WITH DIABETIC PERIPHERAL NEUROPATHY (HCC): ICD-10-CM

## 2024-09-19 DIAGNOSIS — E03.9 ACQUIRED HYPOTHYROIDISM: ICD-10-CM

## 2024-09-19 DIAGNOSIS — Z00.00 MEDICARE ANNUAL WELLNESS VISIT, SUBSEQUENT: Primary | ICD-10-CM

## 2024-09-19 DIAGNOSIS — E78.2 MIXED HYPERLIPIDEMIA: ICD-10-CM

## 2024-09-19 DIAGNOSIS — Z23 NEEDS FLU SHOT: ICD-10-CM

## 2024-09-19 DIAGNOSIS — Z79.4 TYPE 2 DIABETES MELLITUS WITH DIABETIC POLYNEUROPATHY, WITH LONG-TERM CURRENT USE OF INSULIN (HCC): ICD-10-CM

## 2024-09-19 DIAGNOSIS — E11.649 LOW BLOOD SUGAR IN DIABETES (HCC): Primary | ICD-10-CM

## 2024-09-19 DIAGNOSIS — E11.42 TYPE 2 DIABETES MELLITUS WITH DIABETIC POLYNEUROPATHY, WITH LONG-TERM CURRENT USE OF INSULIN (HCC): ICD-10-CM

## 2024-09-19 PROCEDURE — 90653 IIV ADJUVANT VACCINE IM: CPT | Performed by: FAMILY MEDICINE

## 2024-09-19 ASSESSMENT — PATIENT HEALTH QUESTIONNAIRE - PHQ9
8. MOVING OR SPEAKING SO SLOWLY THAT OTHER PEOPLE COULD HAVE NOTICED. OR THE OPPOSITE, BEING SO FIGETY OR RESTLESS THAT YOU HAVE BEEN MOVING AROUND A LOT MORE THAN USUAL: NOT AT ALL
2. FEELING DOWN, DEPRESSED OR HOPELESS: SEVERAL DAYS
10. IF YOU CHECKED OFF ANY PROBLEMS, HOW DIFFICULT HAVE THESE PROBLEMS MADE IT FOR YOU TO DO YOUR WORK, TAKE CARE OF THINGS AT HOME, OR GET ALONG WITH OTHER PEOPLE: SOMEWHAT DIFFICULT
7. TROUBLE CONCENTRATING ON THINGS, SUCH AS READING THE NEWSPAPER OR WATCHING TELEVISION: NOT AT ALL
SUM OF ALL RESPONSES TO PHQ QUESTIONS 1-9: 6
6. FEELING BAD ABOUT YOURSELF - OR THAT YOU ARE A FAILURE OR HAVE LET YOURSELF OR YOUR FAMILY DOWN: NOT AT ALL
5. POOR APPETITE OR OVEREATING: NOT AT ALL
SUM OF ALL RESPONSES TO PHQ QUESTIONS 1-9: 6
SUM OF ALL RESPONSES TO PHQ QUESTIONS 1-9: 6
SUM OF ALL RESPONSES TO PHQ9 QUESTIONS 1 & 2: 1
3. TROUBLE FALLING OR STAYING ASLEEP: MORE THAN HALF THE DAYS
SUM OF ALL RESPONSES TO PHQ QUESTIONS 1-9: 6
1. LITTLE INTEREST OR PLEASURE IN DOING THINGS: NOT AT ALL
4. FEELING TIRED OR HAVING LITTLE ENERGY: NEARLY EVERY DAY
9. THOUGHTS THAT YOU WOULD BE BETTER OFF DEAD, OR OF HURTING YOURSELF: NOT AT ALL

## 2024-09-23 ENCOUNTER — TELEPHONE (OUTPATIENT)
Dept: PHARMACY | Facility: CLINIC | Age: 89
End: 2024-09-23

## 2024-09-23 RX ORDER — INSULIN LISPRO 100 [IU]/ML
10 INJECTION, SOLUTION INTRAVENOUS; SUBCUTANEOUS
Qty: 13 ADJUSTABLE DOSE PRE-FILLED PEN SYRINGE | Refills: 2 | Status: SHIPPED | OUTPATIENT
Start: 2024-09-23

## 2024-09-23 RX ORDER — INSULIN GLARGINE 300 U/ML
120 INJECTION, SOLUTION SUBCUTANEOUS DAILY
Qty: 8 ADJUSTABLE DOSE PRE-FILLED PEN SYRINGE | Refills: 2 | Status: SHIPPED | OUTPATIENT
Start: 2024-09-23

## 2024-09-23 NOTE — TELEPHONE ENCOUNTER
CLINICAL PHARMACY NOTE - [facility] Referral    Patient can be scheduled with:  Team Schedule- General Referrals, etc.    Received a referral from: Provider:    to discuss patient’s medications.     Scheduling Instructions    Please call Dr. Nixon to discuss patient -- Office number [phone] - thank you!       Will route to PharmD then route back to writer for scheduling    Kalee Jimenez CPhT.   [facility] Clinical   [facility] Clinical Pharmacy  Toll free: [phone] Option 1

## 2024-09-23 NOTE — TELEPHONE ENCOUNTER
[facility] CLINICAL PHARMACY REVIEW    Outreach to Dr. Nixon - spoke with Annette who will pass along writer's phone number for call back.     Quinn Granda, PharmD, BCACP, BCGP  Population [facility] Pharmacist   [facility] Clinical Pharmacy  Department, toll free: [phone], option 1

## 2024-09-23 NOTE — TELEPHONE ENCOUNTER
[facility] CLINICAL PHARMACY REVIEW     Spoke to Dr. Nixon regarding insulin adjustments to patient's regimen - see PCP note in 09/19/2024 encounter. Rx for Toujeo Max (insulin glargine U300) and Humalog (insulin lispro U100) pens sent to pharmacy.     Outreach to relay information to patient and answer questions - left message requesting a return call. Would like to share insulin changes and that she should be contacted about filling out PAP applications for insulins if needed. Will try reaching patient again tomorrow.     Outreach to pharmacy - left message requesting they put a note with patient's new insulin Rx that patient call PCP or writer prior to switching insulin as writer unable to reach her and want to ensure the switch occurs without any issues, provided writer's phone number to give to patient if patient has not already spoken with writer.      Quinn Granda, PharmD, BCACP, BCGP  [facility] Pharmacist   [facility] Clinical Pharmacy  Department, toll free: [phone], option 1

## 2024-09-24 NOTE — TELEPHONE ENCOUNTER
[facility] CLINICAL PHARMACY REVIEW     Second attempt made to reach patient - phone did not ring, went straight to voicemail, did not leave a second message at this time.     Outreach to pharmacy - Toujeo and Humalog were both filled and ready for ; Rx were filled for a 2-month supply of each and Toujeo had a copay cost of $105 and Humalog was $70.     Will try reaching patient again later this week to discuss copay pricing and switching of insulin.      Quinn Granda, PharmD, BCACP, BCGP  [facility] Pharmacist   [facility] Clinical Pharmacy  Department, toll free: [phone], option 1

## 2024-09-26 NOTE — TELEPHONE ENCOUNTER
[facility] CLINICAL PHARMACY REVIEW     Third attempt made to reach patient by phone through home and mobile phone numbers - both mailboxes full and unable to accept voice messages at this time.     Per chart review, patient outreached to her endocrinologist's office inquiring about diabetes treatment on 09/13/2024 as she needed to lower her A1c for her orthopedic surgeon to do her knee surgery. Roxanna Clemente NP changed patient's dose to of U500 regular insulin to 90units with breakfast (patient holds dose if BG <100), 60 units with lunch, and 30 units with dinner. Patient also on Trulicity (dulaglutide) 4.5mg weekly.     Unknown when planned knee surgery is scheduled for or if it's been scheduled yet. CareEverywhere shows there may have been some type of encounter (telephone call, labs, office visit) on 09/24/2024 with Luanne Alvarez MD, an orthopedic surgeon.     Will route encounter to PCP to let her know writer has been unable to reach patient.     Quinn Granda, PharmD, BCACP, BCGP  Population [facility] Pharmacist   [facility] Clinical Pharmacy  Department, toll free: [phone], option 1

## 2024-09-30 NOTE — TELEPHONE ENCOUNTER
Patient notified by phone.   She is going to call later today.     She wanted to let Dr Sutherland know that earlier today her sugar was low - with the meter(fingerstick) it was 51 and with the phone(CGM) it was 49.

## 2024-09-30 NOTE — TELEPHONE ENCOUNTER
Please reach out to Blanka and let her know that they are trying to reach her,  Please see if we can help her connect.

## 2024-10-01 RX ORDER — MULTIVIT-MIN/FA/LYCOPEN/LUTEIN .4-300-25
1 TABLET ORAL DAILY
COMMUNITY

## 2024-10-01 RX ORDER — OMEGA-3S/DHA/EPA/FISH OIL 300-1000MG
4 CAPSULE ORAL EVERY MORNING
COMMUNITY

## 2024-10-01 RX ORDER — ACYCLOVIR 400 MG/1
TABLET ORAL
COMMUNITY

## 2024-10-01 RX ORDER — VITAMIN B COMPLEX
1 CAPSULE ORAL DAILY
COMMUNITY

## 2024-10-01 RX ORDER — INSULIN HUMAN 500 [IU]/ML
INJECTION, SOLUTION SUBCUTANEOUS
COMMUNITY

## 2024-10-01 RX ORDER — ACETAMINOPHEN 500 MG
1000 TABLET ORAL EVERY 6 HOURS PRN
COMMUNITY

## 2024-10-01 RX ORDER — CALCIUM CARBONATE 500 MG/1
1 TABLET, CHEWABLE ORAL DAILY PRN
COMMUNITY

## 2024-10-01 NOTE — TELEPHONE ENCOUNTER
[facility] CLINICAL PHARMACY REVIEW    Patient returned writer's call - she had all her medications out in front of her so we started with a medication review. Updated Epic home medication list.     During call, patient shared she had levothyroxine but it had been discontinued so no longer taking. Writer offered to mail her a letter with information on proper medication disposal at home as well as any locations near her that take back medications to dispose. Patient would appreciate this information. Writer will look into and get sent to patient.     Patient shared yesterday she woke up and her BG was 49 via Dexcom so she used glucometer and BG was 51. She was testing prior to injecting morning dose of insulin so she skipped insulin and ate breakfast to treat the low BG.     Patient verified recent insulin dose change from Endo provider to assist with lowering A1c so that she can get shot in her knee, she reports orthopedic provider is worried about patient infection with elevated A1c. Inquired if she knew gola A1c for her procedure to be completed on knee and she thinks it is 8% or less. Ortho provider is Dr. Alvarez.     Discussed changing insulin products. Patient is open to use basal-bolus insulin as long as copay affordable, she thinks $30-40 may be too high to afford every month; she would prefer if it is free through patient assistance program given her and 's fixed income. She also would prefer to not use more than 3 injections daily - shared that she would have one injection of long-acting dosing and then two injections of fast-acting insulin that would be taken with both her meals. Patient shared she has probably up to 2 years supply of HumulinR U500 because she keeps getting through PAP - she verified she keeps in refrigerator and monitors expiration dates to use ones that are expiring sooner before others. She states she has probably 2-4 months of Trulicity at home currently. Outreach to

## 2024-10-02 ENCOUNTER — CARE COORDINATION (OUTPATIENT)
Dept: CARE COORDINATION | Age: 89
End: 2024-10-02

## 2024-10-02 NOTE — TELEPHONE ENCOUNTER
Gail, thank you for your help. She is due for the thyroid     Toya,   Can you help [facility] and [facility] patient assistance programs?  Thank you!

## 2024-10-03 NOTE — CARE COORDINATION
Ambulatory Care Coordination Note     10/2/2024    Writer covering for primary ACM  (Toya) received routing message from PCP. See message below.     Note     Gail, thank you for your help. She is due for the thyroid      Toya,   Can you help Humalog and Toujeo patient assistance programs?  Thank you!           In basket message routed to Linda Diallo, medication  with care coordination for assistance with PAP applications.     Writer attempted to reach patient at 2 listed phone numbers without success. Unable to leave messages on either lines due to \"mailbox is full.\"     Future Appointments   Date Time Provider Department Center   12/19/2024  1:20 PM Kenia Nixon MD [facility] DEP       Care Coordination Plan of Care:   This nurse Care Coordinator will  - await call back from patient. If no call in 3-4 days will attempt to reach again   -collaborate with medication

## 2024-10-04 NOTE — TELEPHONE ENCOUNTER
[facility] CLINICAL PHARMACY REVIEW    Appreciate care coordinator working with Linda Yoel for PAP assistance. Will close encounter at this time and wait to for coverage results.     Quinn Granda, PharmD, BCACP, BCGP  Population [facility] Pharmacist   [facility] Clinical Pharmacy  Department, toll free: [phone], option 1    =======================================================    For Pharmacy Admin Tracking Only  Program: [facility] [employer]  CPA in place:  No  Recommendation Provided To: Provider: 1 via Note to Provider, Patient/Caregiver: 1 via Telephone, and Pharmacy: 1  Intervention Detail: New Rx: 1, reason: JEROME  Intervention Accepted By: Provider: 1, Patient/Caregiver: 1, and Pharmacy: 1  Gap Closed?: Yes   Time Spent (min):  180

## 2024-10-09 ENCOUNTER — TELEPHONE (OUTPATIENT)
Dept: FAMILY MEDICINE CLINIC | Age: 89
End: 2024-10-09

## 2024-10-09 ENCOUNTER — CARE COORDINATION (OUTPATIENT)
Dept: CARE COORDINATION | Age: 89
End: 2024-10-09

## 2024-10-09 DIAGNOSIS — E11.42 DM TYPE 2 WITH DIABETIC PERIPHERAL NEUROPATHY (HCC): Primary | ICD-10-CM

## 2024-10-09 NOTE — CARE COORDINATION
Ambulatory Care Coordination Note     10/9/2024    In basket message routed to Linda Diallo medication  with care coordination for assistance with PAP applications.     Writer attempted to reach patient listed phone number without success. Unable to leave message due to \"mailbox is full.\"     Future Appointments   Date Time Provider Department Center   12/19/2024  1:20 PM Kenia Nixon MD [facility] DEP       Care Coordination Plan of Care:   This nurse Care Coordinator will  - await call back from patient  -collaborate with medication

## 2024-10-10 ENCOUNTER — CARE COORDINATION (OUTPATIENT)
Dept: CARE COORDINATION | Age: 89
End: 2024-10-10

## 2024-10-10 DIAGNOSIS — E11.42 DM TYPE 2 WITH DIABETIC PERIPHERAL NEUROPATHY (HCC): ICD-10-CM

## 2024-10-10 NOTE — CARE COORDINATION
Ambulatory Care Coordination Note     10/10/2024 3:35 PM       In basket message received from medication :   if she is currently in the [employer] Program for Humulin they can just add the Humalog and possibly Basaglar instead of Toujeo. This way she will get these two for free and not do another application. All the office has to do is send new scripts to the PAP program and say on the cover sheet they are new medications for a current patient. [employer] Fax number: [phone]    Future Appointments   Date Time Provider Department Center   12/19/2024  1:20 PM [facility] DEP       Care Coordination Plan of Care:   This nurse Care Coordinator will  - update PCP with message and assist as needed

## 2024-10-12 RX ORDER — INSULIN GLARGINE 100 [IU]/ML
60 INJECTION, SOLUTION SUBCUTANEOUS 2 TIMES DAILY
Qty: 36 ADJUSTABLE DOSE PRE-FILLED PEN SYRINGE | Refills: 3 | Status: SHIPPED | OUTPATIENT
Start: 2024-10-12

## 2024-10-12 RX ORDER — INSULIN LISPRO 100 [IU]/ML
10 INJECTION, SOLUTION INTRAVENOUS; SUBCUTANEOUS
Qty: 13 ADJUSTABLE DOSE PRE-FILLED PEN SYRINGE | Refills: 2 | Status: SHIPPED | OUTPATIENT
Start: 2024-10-12

## 2024-10-12 NOTE — CARE COORDINATION
Thank you!  Scripts printed.  Please fax as in the note and notify patient to expect these.  HAve her call when she receives the scripts so we can instruct her on starting.

## 2024-10-21 ENCOUNTER — CARE COORDINATION (OUTPATIENT)
Dept: CARE COORDINATION | Age: 89
End: 2024-10-21

## 2024-10-22 NOTE — CARE COORDINATION
Ambulatory Care Coordination Note     10/21/2024     Noted PAP application scanned into media on 10/18/2024. Call placed to [facility]. Spoke with someone with pharmacy who confirmed they received the fax for Humalog and Basaglar however application was not complete and missing information. Call was transferred to [facility]. Spoke with a rep who stated that they require both pages 8 and 9 and only received one page. [facility] requested rep fax over the required documents to PCP office so we can see which page is missing. PCP office fax number provided.     Routed chart to PCP office staff to inform of incoming fax from [facility] and that 2 pages need sent in order to get medications approved- only faxed 1 page before. Need to determine which page is missing, complete that forma nd fax back to company in order for application to be processed and medications to be added.      Future Appointments   Date Time Provider Department Center   10/25/2024 11:20 AM Kenia Nixon MD [facility] DEP   12/19/2024  1:20 PM Kenia Nixon MD [facility] DEP     Care Coordination Plan of Care:   This nurse Care Coordinator will  - plan chart review/ follow up in 1 week   -was missing form faxed to [facility]? Medications added so patient can receive

## 2024-10-23 DIAGNOSIS — E11.42 DM TYPE 2 WITH DIABETIC PERIPHERAL NEUROPATHY (HCC): ICD-10-CM

## 2024-10-23 RX ORDER — INSULIN GLARGINE 100 [IU]/ML
60 INJECTION, SOLUTION SUBCUTANEOUS 2 TIMES DAILY
Qty: 36 ADJUSTABLE DOSE PRE-FILLED PEN SYRINGE | Refills: 3 | Status: SHIPPED | OUTPATIENT
Start: 2024-10-23

## 2024-10-23 RX ORDER — INSULIN LISPRO 100 [IU]/ML
10 INJECTION, SOLUTION INTRAVENOUS; SUBCUTANEOUS
Qty: 13 ADJUSTABLE DOSE PRE-FILLED PEN SYRINGE | Refills: 3 | Status: SHIPPED | OUTPATIENT
Start: 2024-10-23

## 2024-10-24 NOTE — TELEPHONE ENCOUNTER
CGM report downloaded and reviewed from the past 2 weeks scanned to media tab. Time in range 93%, 67% hyperglycemia, and hypoglycemia 7%.  Predicted A1c per CGM report 8.7% and average glucose 224 mg/dl.     Continued overnight lows noted- In process of the medication changes.

## 2024-10-25 ENCOUNTER — OFFICE VISIT (OUTPATIENT)
Dept: FAMILY MEDICINE CLINIC | Age: 89
End: 2024-10-25

## 2024-10-25 VITALS
SYSTOLIC BLOOD PRESSURE: 126 MMHG | WEIGHT: 149 LBS | OXYGEN SATURATION: 95 % | BODY MASS INDEX: 25.58 KG/M2 | HEART RATE: 50 BPM | DIASTOLIC BLOOD PRESSURE: 66 MMHG

## 2024-10-25 DIAGNOSIS — E03.9 ACQUIRED HYPOTHYROIDISM: ICD-10-CM

## 2024-10-25 DIAGNOSIS — E11.42 DM TYPE 2 WITH DIABETIC PERIPHERAL NEUROPATHY (HCC): Primary | ICD-10-CM

## 2024-10-25 LAB
ALBUMIN/GLOBULIN RATIO: 1.4 G/DL
ALBUMIN: 3.8 G/DL (ref 3.5–5)
ALP BLD-CCNC: 93 UNITS/L (ref 38–126)
ALT SERPL-CCNC: 12 UNITS/L (ref 4–35)
ANION GAP SERPL CALCULATED.3IONS-SCNC: 9.6 MMOL/L (ref 3–11)
AST SERPL-CCNC: 20 UNITS/L (ref 14–36)
BILIRUB SERPL-MCNC: 0.5 MG/DL (ref 0.2–1.3)
BUN BLDV-MCNC: 25 MG/DL (ref 7–17)
CALCIUM SERPL-MCNC: 9.6 MG/DL (ref 8.4–10.2)
CHLORIDE BLD-SCNC: 97 MMOL/L (ref 98–120)
CHOLESTEROL, TOTAL: 148 MG/DL (ref 50–200)
CHOLESTEROL/HDL RATIO: 2.14 RATIO (ref 0–4.5)
CO2: 33 MMOL/L (ref 22–31)
CREAT SERPL-MCNC: 0.8 MG/DL (ref 0.5–1)
GFR, ESTIMATED: > 60
GLOBULIN: 2.7 G/DL
GLUCOSE: 175 MG/DL (ref 65–105)
HDLC SERPL-MCNC: 69 MG/DL (ref 36–68)
LDL CHOLESTEROL: 55.4 MG/DL (ref 0–160)
POTASSIUM SERPL-SCNC: 3.6 MMOL/L (ref 3.6–5)
SODIUM BLD-SCNC: 136 MMOL/L (ref 135–145)
T4 FREE: 0.94 NG/DL (ref 0.78–2.19)
TOTAL PROTEIN: 6.5 G/DL (ref 6.3–8.2)
TRIGL SERPL-MCNC: 118 MG/DL (ref 10–250)
TSH SERPL DL<=0.05 MIU/L-ACNC: 1.15 MIU/ML (ref 0.49–4.67)
VLDLC SERPL CALC-MCNC: 24 MG/DL (ref 0–50)

## 2024-10-25 NOTE — PROGRESS NOTES
[place] [address] [address]  [place] [address]  Dept: [phone]  Dept Fax:[phone]    Liv Ron is a 89 y.o. female who presents today for her medical conditions/complaints as notedbelow.        Assessment/Plan:     Assessment & Plan  1. Diabetes Mellitus.  The patient is currently on Humulin 500, taking 90 units in the morning, 70 units at lunch, and 80 units at dinner. She has been advised to reduce her insulin dosage due to low blood sugar levels at night. A prescription for Humulin 100 and Basaglar, a long-acting insulin, has been sent. She is to continue her current medication regimen until the new prescriptions arrive. Upon receipt of the new medications, she should contact the office for further instructions. The U-500 will be discontinued and replaced with the new insulin. She is to continue her oral medications and Trulicity. Until her basaglar and humalog flexpen are available the current U500 insulin regimen will be 90 units in the morning when blood sugar is over 100, 70 units at lunch, and 40 units at dinner. This regimen will be followed until the new insulins are received. If there are any questions after discussing the new insulins with Quinn from the pharmacy, she should contact the office for further assistance. The current insulin will be forwarded to the endocrinologist for potential use.      Assessment & Plan  DM type 2 with diabetic peripheral neuropathy (HCC)        Acquired hypothyroidism              Results      Lab Results   Component Value Date    WBC 9.2 12/07/2023    HGB 12.7 12/07/2023    HCT 41.8 12/07/2023    .1 12/07/2023    CHOL 148 10/25/2024    TRIG 118 10/25/2024    HDL 69 (H) 10/25/2024    ALT 12 10/25/2024    AST 20 10/25/2024     10/25/2024    K 3.6 10/25/2024    CL 97 (L) 10/25/2024    CREATININE 0.8 10/25/2024    BUN 25 (H) 10/25/2024    CO2 33 (H) 10/25/2024    TSH 1.15 10/25/2024    INR 1.1

## 2024-10-30 ENCOUNTER — CARE COORDINATION (OUTPATIENT)
Dept: CARE COORDINATION | Age: 89
End: 2024-10-30

## 2024-10-31 NOTE — CARE COORDINATION
10/31/24    KHADAR updated ACM that she has collaborated with Michaela Cantu LPN at [facility] practice. Michaela informed KHADAR Thompson that they completed the paperwork for Kat Marshall PAP and faxed this afternoon. Michaela told Donna she spoke with the company and verified everything required was received including prescriptions. Everything should be faxed in at this time.     Future Appointments   Date Time Provider Department Center   12/19/2024  1:20 PM Kenia Nixon MD [facility] DEP       Care Coordination Plan of Care:   This nurse Care Coordinator will  - plan outreach call in 7-14 days  -verify with Kat Marshall approval of new medications and shipment

## 2024-10-31 NOTE — CARE COORDINATION
Ambulatory Care Coordination Note     10/31/2024      Call placed to Kat Marshall PAP. Spoke with representative who states they did receive the fax however the paperwork that was faxed to them is an old form and unfortunately they are unable to accept that. States they are missing pages 8 and 9 (Healthcare Provider/Prescriber Portion and the Kat Cares Prescription Template section). Rep states that it would be faster for ACM to go to [employer] and print off application, have PCP complete pages 8 and 9 and fax those 2 pages. They are unable to move forward at this time without these 2 pages and Humalog and Basaglar can not be added to approved PAP patient already enrolled in.     E-mail sent to KHADAR Thompson. [facility] requesting KHADAR to print off pages 8 and 9 of the application and fax these pages to PCP office. PCP fax number provided to Donna. Asked Donna to add on the cover page instruction for PCP/staff to complete and to fax back to Kat Marshall.     Future Appointments   Date Time Provider Department Center   12/19/2024  1:20 PM Kenia Nixon MD [facility] DEP       Care Coordination Plan of Care:   This nurse Care Coordinator will  - collaborate with CCSS to make sure fax was successful  -plan follow up in 1-2 weeks   -review media section   -call Kat Marshall to make sure all paperwork complete so patient can get Basaglar and Humalog

## 2024-11-12 ENCOUNTER — TELEPHONE (OUTPATIENT)
Dept: FAMILY MEDICINE CLINIC | Age: 89
End: 2024-11-12

## 2024-11-12 DIAGNOSIS — E11.42 DM TYPE 2 WITH DIABETIC PERIPHERAL NEUROPATHY (HCC): Primary | ICD-10-CM

## 2024-11-14 NOTE — TELEPHONE ENCOUNTER
CGM downloaded and scanned into media  
CGM report downloaded and reviewed from the past 2 weeks scanned to media tab. Time in range 18%, 69% hyperglycemia, and hypoglycemia 3% low and 10% VERY LOW.  Predicted A1c per CGM report 8.7%     Please check with Blanka. Has she received the new insulins from patient assistance?  If so we need to get her started on these.  If not please help her call and find out the status of these.      Have her drop her bedtime Humulin R to 20 units from the 40 at this time also.    
Spoke with patient assistance- she was approved and they processed basaglar but it has not yet shipped. It appears that they missed the humalog on the application.  It is on the application but someone did not see it.  She is going to request they reprocess it and add humalog.  That should be processed soon also.    
noted  
No

## 2024-11-15 ENCOUNTER — CARE COORDINATION (OUTPATIENT)
Dept: CARE COORDINATION | Age: 89
End: 2024-11-15

## 2024-11-15 NOTE — CARE COORDINATION
Ambulatory Care Coordination Note     11/15/2024      Patient outreach attempt by this ACM today to perform care management follow up . ACM was unable to reach the patient by telephone today;   Mailbox full      ACM: Linda Altamirano RN     Care Summary Note: Per encounter note- PCP office called Kat Marshall and noted patient was approved. Working on Basaglar and trying to fill Humalog as Kat Marshall missed Humalog on the order form. Should be shipped soon to patient.     PCP/Specialist follow up:   Future Appointments         Provider Specialty Dept Phone    12/19/2024 1:20 PM Kenia Nixon MD Family Medicine [phone]            Follow Up:   Plan for next ACM outreach in approximately 2 weeks to complete:  - medication review  Get PAP medication shipment?  .

## 2024-11-18 ENCOUNTER — TELEPHONE (OUTPATIENT)
Dept: FAMILY MEDICINE CLINIC | Age: 89
End: 2024-11-18

## 2024-11-18 NOTE — TELEPHONE ENCOUNTER
Patient went to her endocrinologist last week and they changed her insulin.     Insulin Lispro pen 52 units tid before meals  Lantus 80 units at night.  (She states she took it one night (Friday)and her sugars went way down -59)  She caught it at 59 and drank some apple juice. She did not take it again after that. She is afraid to take it at night because it will make her sugar to go too low.     She has not received her patient assistance yet but it is on the way.     She wants to know what she should take.

## 2024-11-20 ENCOUNTER — TELEPHONE (OUTPATIENT)
Dept: PHARMACY | Facility: CLINIC | Age: 88
End: 2024-11-20

## 2024-11-20 RX ORDER — TRAMADOL HYDROCHLORIDE 50 MG/1
50 TABLET ORAL 2 TIMES DAILY PRN
COMMUNITY
Start: 2024-11-14

## 2024-11-21 ENCOUNTER — OFFICE VISIT (OUTPATIENT)
Dept: FAMILY MEDICINE CLINIC | Age: 89
End: 2024-11-21
Payer: MEDICARE

## 2024-11-21 VITALS
OXYGEN SATURATION: 95 % | BODY MASS INDEX: 25.75 KG/M2 | DIASTOLIC BLOOD PRESSURE: 74 MMHG | WEIGHT: 150 LBS | SYSTOLIC BLOOD PRESSURE: 132 MMHG | HEART RATE: 76 BPM

## 2024-11-21 DIAGNOSIS — E11.42 DM TYPE 2 WITH DIABETIC PERIPHERAL NEUROPATHY (HCC): Primary | ICD-10-CM

## 2024-11-21 PROCEDURE — 99212 OFFICE O/P EST SF 10 MIN: CPT | Performed by: FAMILY MEDICINE

## 2024-11-21 NOTE — PROGRESS NOTES
[address], [address]  [place] [address]  Dept: [phone]  Dept Fax:[phone]    Liv Ron is a 89 y.o. female who presents today for her medical conditions/complaints as notedbelow.        Assessment/Plan:     Assessment & Plan  1. Diabetes Mellitus.  Her current blood sugar level is 187, indicating a need for insulin adjustment. She was advised to administer 80 units of Lantus at bedtime, irrespective of her blood sugar level. A sliding scale for insulin lispro was provided, with specific dosages corresponding to different blood sugar ranges. She was instructed to take insulin lispro only before meals and to check her blood sugar level prior to each meal. If her blood sugar is below 70, she should not take insulin lispro. She was also advised to continue her Trulicity regimen every Tuesday. She was instructed to contact the office if she experiences any issues with low blood sugar.    Follow-up  Patient return in 3 weeks for follow up.      Assessment & Plan         Results  Laboratory Studies  Blood sugar is 187.    Lab Results   Component Value Date    WBC 9.2 12/07/2023    HGB 12.7 12/07/2023    HCT 41.8 12/07/2023    .1 12/07/2023    CHOL 148 10/25/2024    TRIG 118 10/25/2024    HDL 69 (H) 10/25/2024    ALT 12 10/25/2024    AST 20 10/25/2024     10/25/2024    K 3.6 10/25/2024    CL 97 (L) 10/25/2024    CREATININE 0.8 10/25/2024    BUN 25 (H) 10/25/2024    CO2 33 (H) 10/25/2024    TSH 1.15 10/25/2024    INR 1.1 09/16/2023    LABA1C 9.1 (H) 09/24/2024    LABA1C 9.3 (H) 08/13/2024    LABA1C 8.6 03/27/2024       No follow-ups on file.    Subjective:      HPI:     Liv Ron is c/o of Medication Check and Fatigue (Pt states that she has been tired more and sleeping a lot more than normal. /)      History of Present Illness  The patient presents for evaluation of diabetes.    She reports that her endocrinologist was

## 2024-11-22 ENCOUNTER — TELEPHONE (OUTPATIENT)
Dept: FAMILY MEDICINE CLINIC | Age: 88
End: 2024-11-22

## 2024-11-22 DIAGNOSIS — E11.42 DM TYPE 2 WITH DIABETIC PERIPHERAL NEUROPATHY (HCC): Primary | ICD-10-CM

## 2024-11-22 RX ORDER — INSULIN GLARGINE 100 [IU]/ML
60 INJECTION, SOLUTION SUBCUTANEOUS NIGHTLY
Qty: 5 ADJUSTABLE DOSE PRE-FILLED PEN SYRINGE | Refills: 3
Start: 2024-11-22

## 2024-11-22 RX ORDER — INSULIN GLARGINE 100 [IU]/ML
70 INJECTION, SOLUTION SUBCUTANEOUS NIGHTLY
Qty: 5 ADJUSTABLE DOSE PRE-FILLED PEN SYRINGE | Refills: 3
Start: 2024-11-22 | End: 2024-11-22

## 2024-11-22 NOTE — TELEPHONE ENCOUNTER
LMOM on the machine to decrease her Lantus to 60 units tonight and let us know on Monday how her sugars are.  Please try to call her again this afternoon.

## 2024-11-22 NOTE — TELEPHONE ENCOUNTER
Pt called and informed of new dosing for Friday to 60 units and will call on Monday to report sugars

## 2024-11-22 NOTE — TELEPHONE ENCOUNTER
Spoke with patient and she states that when she first woke up and checked her sugar with it being 45 she was feeling a little weak and lightheaded. Pt states states that now that she has ate breakfast she is feeling good and her sugar is now 196

## 2024-12-02 ENCOUNTER — CARE COORDINATION (OUTPATIENT)
Dept: CARE COORDINATION | Age: 88
End: 2024-12-02

## 2024-12-02 NOTE — CARE COORDINATION
Ambulatory Care Coordination Note     12/2/2024 12:16 PM     patient outreach attempt by this ACM today to perform care management follow up . ACM was unable to reach the patient by telephone today;   voicemail full and unable to leave a message.     Noted patient working closely with PCP office and endocrinology office.   Noted patient assistance insulin shipped to patient. Approved through [employer] for all insulin      Patient closed (unable to reach patient) from the High Risk Care Management program on 12/2/2024.    Future Appointments   Date Time Provider Department Center   12/19/2024  1:20 PM Kenia Nixon MD [facility] DEP       No further Ambulatory Care Manager follow up scheduled. [facility] was able to get patient approved for all insulin through [employer] program

## 2024-12-06 NOTE — TELEPHONE ENCOUNTER
Noted, thanks  
[facility] CL[facility]ICAL PHARMACY REVIEW    Noted care coordination has been unable to reach patient so writer tried to reach patient as patient shared previously that have to call cell phone multiple times to get through (updated chart to reflect home phone is preferred number). Patient shared she has been doing well on her new insulin regimen of Lantus 60 units at night and sliding scale insulin lispro, which she was able to verbalize to writer based on handout PCP provided her at last visit (BG <70 take none,  take 20 units, 101-150 take 40 units, 151-200 take 43 units, 201-250 take 46 units, 251-300 take 49 units, 301-350 take 52 units). She reports her BG this morning was 83 and she has not had any more overnight lows since dropping the Lantus dose to 60 units once daily at night; she doesn't think had any readings less than 80 since dose change. She reports her and her  usually eat two meals a day, sometimes three; she most commonly takes 40-49 units with meals based on sliding scale, she shared she has not had a BG greater than 300 where she would need 52 units. Patient states she feels good and is happy with Dr. Nixon managing her DM and insulin dosing. She expressed appreciation for writer's outreach and that she will call PCP office if needs anything before next appointment, she said that everyone at the office is great to work with and speak to. Will send FYI to PCP.     Quinn Granda, PharmD, BCACP, BCGP  [facility] Pharmacist   [facility] Clinical Pharmacy  Department, toll free: [phone], option 1    =======================================================    For Pharmacy Admin Tracking Only  Program: [facility]  CPA in place:  No  Gap Closed?: Yes   Time Spent (min): 60    
Wonderful!  I am so glad she is doing better with this regimen.  
Pharmacy   LANTUS SOLOSTAR     INJ 11/14/2024 75 60 mL Linnea Bush MD [id number] ...     Insulin Lispro   Dispensed Days Supply Quantity Provider Pharmacy   INSULIN LISPRO /ML INJ 11/15/2024 87 135 mL Linnea Bush MD [id number] ...     Called home number on patient's profile - reached her  as patient not home. Requested he remind patient to bring all medications including insulin to appointment with PCP tomorrow and he states he believes that is what patient was planning on doing.     Will continue to follow.     Quinn Granda, PharmD, BCACP, BCGP  Population Health Pharmacist   [facility] Clinical Pharmacy  Department, toll free: [phone], option 1

## 2024-12-10 ENCOUNTER — TELEPHONE (OUTPATIENT)
Dept: FAMILY MEDICINE CLINIC | Age: 88
End: 2024-12-10

## 2024-12-10 NOTE — TELEPHONE ENCOUNTER
Telma from [facility] surgery called and states that patient was in for a procedure for pain management. Before the procedure her sugar was 329. After the procedure on patients dexcom her sugar was 215 and with a finger poke it was 184.     Telma states that patient was having a hard time understanding all the instructions with her medications and went over all the updated medications as I faxed patients current medication list. Telma was advised PCP went over all the instructions multiple times and has an appointment 12/19.    Telma advised patient to keep an eye on her sugars and to call PCP in the AM with what her readings.     Operative Note scanned in.

## 2024-12-12 ENCOUNTER — TELEPHONE (OUTPATIENT)
Dept: FAMILY MEDICINE CLINIC | Age: 88
End: 2024-12-12

## 2024-12-12 DIAGNOSIS — E11.42 DM TYPE 2 WITH DIABETIC PERIPHERAL NEUROPATHY (HCC): ICD-10-CM

## 2024-12-12 NOTE — TELEPHONE ENCOUNTER
This is OK-- how has she been overall?  There will be some variability between the CGM and her normal meter

## 2024-12-13 RX ORDER — INSULIN LISPRO 100 [IU]/ML
INJECTION, SOLUTION INTRAVENOUS; SUBCUTANEOUS
Qty: 13 ADJUSTABLE DOSE PRE-FILLED PEN SYRINGE | Refills: 3 | Status: CANCELLED | OUTPATIENT
Start: 2024-12-13

## 2024-12-13 RX ORDER — INSULIN GLARGINE 100 [IU]/ML
60 INJECTION, SOLUTION SUBCUTANEOUS 2 TIMES DAILY
Qty: 36 ADJUSTABLE DOSE PRE-FILLED PEN SYRINGE | Refills: 3 | Status: CANCELLED | OUTPATIENT
Start: 2024-12-13

## 2024-12-13 NOTE — TELEPHONE ENCOUNTER
Patient states that she is taking 60 units of the basaglar at night.   Patient states she is not taking the kwikpen at night.     Patient was advised to monitor her sugar before bed and in the morning over the weekend and to call back on Monday.

## 2024-12-13 NOTE — TELEPHONE ENCOUNTER
CGM reviewed shows some lows- cassidy  if below 200 at bedtime.  Reviewed her current insulin regimen.     Verify she is taking the 60 units of the basaglar.    Verify if she is taking any insulin of the fast acting Humalog kwikpen at night-- Should not take the kwikpen at bedtime.    Make sure eating a snack with protein at bedtime. Like cheese, peanut butter, beef stick etc.

## 2024-12-13 NOTE — TELEPHONE ENCOUNTER
Spoke with patient and she states that her sugar on her phone was 201 this AM and she took 46 units. Patient states that she is feeling good just tired all the time.

## 2024-12-16 ENCOUNTER — TELEPHONE (OUTPATIENT)
Dept: FAMILY MEDICINE CLINIC | Age: 88
End: 2024-12-16

## 2024-12-16 DIAGNOSIS — E11.42 DM TYPE 2 WITH DIABETIC PERIPHERAL NEUROPATHY (HCC): Primary | ICD-10-CM

## 2024-12-17 ENCOUNTER — TELEPHONE (OUTPATIENT)
Dept: FAMILY MEDICINE CLINIC | Age: 88
End: 2024-12-17

## 2024-12-17 NOTE — TELEPHONE ENCOUNTER
Patient called and states that over the weekend she checked her sugar around 8 in the morning. On Saturday her meter read 209 and on her phone it was 264, on Sunday 203 and on her phone 196, and on Monday 153 and on her phone 167.

## 2024-12-19 ENCOUNTER — OFFICE VISIT (OUTPATIENT)
Dept: FAMILY MEDICINE CLINIC | Age: 88
End: 2024-12-19
Payer: MEDICARE

## 2024-12-19 VITALS
WEIGHT: 149 LBS | DIASTOLIC BLOOD PRESSURE: 74 MMHG | HEART RATE: 90 BPM | BODY MASS INDEX: 25.58 KG/M2 | SYSTOLIC BLOOD PRESSURE: 126 MMHG | OXYGEN SATURATION: 96 %

## 2024-12-19 DIAGNOSIS — M17.12 ARTHRITIS OF LEFT KNEE: Primary | ICD-10-CM

## 2024-12-19 DIAGNOSIS — E11.42 DM TYPE 2 WITH DIABETIC PERIPHERAL NEUROPATHY (HCC): ICD-10-CM

## 2024-12-19 DIAGNOSIS — H61.23 BILATERAL IMPACTED CERUMEN: ICD-10-CM

## 2024-12-19 PROCEDURE — 99212 OFFICE O/P EST SF 10 MIN: CPT | Performed by: FAMILY MEDICINE

## 2024-12-19 RX ORDER — INSULIN GLARGINE 100 [IU]/ML
60 INJECTION, SOLUTION SUBCUTANEOUS 2 TIMES DAILY
Qty: 36 ADJUSTABLE DOSE PRE-FILLED PEN SYRINGE | Refills: 3
Start: 2024-12-19

## 2024-12-19 NOTE — TELEPHONE ENCOUNTER
CGM report downloaded and reviewed from the past 2 weeks scanned to media tab. Time in range 34%, 61% hyperglycemia, and hypoglycemia 5%.  Predicted A1c per CGM report 8.3% and average glucose 207 mg/dl.   See office visit 12/19/2024 for insulin adjustments

## 2024-12-19 NOTE — PROGRESS NOTES
been ordered which may not be completely evident from the above note due to system interface incompatibilities.     Patient given educational materials - see patientinstructions.  Discussed use, benefit, and side effects of prescribed medications.  All patient questions answered.  Pt voiced understanding. Reviewed health maintenance.  Instructed to continue current medications, diet andexercise.  Patient agreed with treatment plan. Follow up as directed.     The patient (or guardian, if applicable) and other individuals in attendance with the patient were advised that Artificial Intelligence will be utilized during this visit to record, process the conversation to generate a clinical note, and support improvement of the AI technology. The patient (or guardian, if applicable) and other individuals in attendance at the appointment consented to the use of AI, including the recording.                   Electronically signed by Kenia Nixon MD on 12/22/2024

## 2024-12-19 NOTE — ASSESSMENT & PLAN NOTE
Orders:    insulin glargine (BASAGLAR KWIKPEN) 100 UNIT/ML injection pen; Inject 60 Units into the skin 2 times daily

## 2025-01-06 ENCOUNTER — TELEPHONE (OUTPATIENT)
Dept: FAMILY MEDICINE CLINIC | Age: 89
End: 2025-01-06

## 2025-01-06 DIAGNOSIS — E11.42 DM TYPE 2 WITH DIABETIC PERIPHERAL NEUROPATHY (HCC): ICD-10-CM

## 2025-01-06 NOTE — TELEPHONE ENCOUNTER
Pt said she was to call back to let you know her 2 insulins need sent to [facility].  Their telephone# is [phone].  That is all the information they gave her.  Call her with any further questions on her cell phone.  Home# not working right.

## 2025-01-06 NOTE — TELEPHONE ENCOUNTER
Liv Ron is requesting a refill on the following medication(s):  Requested Prescriptions     Pending Prescriptions Disp Refills    insulin glargine (BASAGLAR KWIKPEN) 100 UNIT/ML injection pen 36 Adjustable Dose Pre-filled Pen Syringe 3     Sig: Inject 60 Units into the skin 2 times daily    insulin lispro, 1 Unit Dial, (HUMALOG KWIKPEN) 100 UNIT/ML SOPN 13 Adjustable Dose Pre-filled Pen Syringe 3     Sig: Sliding scale insulin:   Under 70 take none    take 20 units   101-150 take 40 units   151-200 take 43 units   201-250 take 46 units   251-300 take 49 units   Over 300 take 52 units.       Last Visit Date (If Applicable):  12/19/2024    Next Visit Date:    3/19/2025

## 2025-01-07 RX ORDER — INSULIN GLARGINE 100 [IU]/ML
60 INJECTION, SOLUTION SUBCUTANEOUS 2 TIMES DAILY
Qty: 36 ADJUSTABLE DOSE PRE-FILLED PEN SYRINGE | Refills: 3 | Status: SHIPPED | OUTPATIENT
Start: 2025-01-07

## 2025-01-07 RX ORDER — INSULIN LISPRO 100 [IU]/ML
INJECTION, SOLUTION INTRAVENOUS; SUBCUTANEOUS
Qty: 13 ADJUSTABLE DOSE PRE-FILLED PEN SYRINGE | Refills: 3 | Status: SHIPPED | OUTPATIENT
Start: 2025-01-07

## 2025-01-07 RX ORDER — INSULIN GLARGINE 100 [IU]/ML
60 INJECTION, SOLUTION SUBCUTANEOUS 2 TIMES DAILY
Qty: 36 ADJUSTABLE DOSE PRE-FILLED PEN SYRINGE | Refills: 3 | Status: SHIPPED
Start: 2025-01-07 | End: 2025-01-07

## 2025-01-10 ENCOUNTER — TELEPHONE (OUTPATIENT)
Dept: FAMILY MEDICINE CLINIC | Age: 89
End: 2025-01-10

## 2025-01-10 NOTE — TELEPHONE ENCOUNTER
PT called stating she is going to start a new insulin soon, and wants to know if Dr. Nixon would like to see here before starting new insulin.

## 2025-01-12 NOTE — TELEPHONE ENCOUNTER
That wouild be a good idea.  Bring the insulin in with her to ensure we are talking the correct doses etc.

## 2025-01-15 ENCOUNTER — OFFICE VISIT (OUTPATIENT)
Dept: FAMILY MEDICINE CLINIC | Age: 89
End: 2025-01-15
Payer: MEDICARE

## 2025-01-15 VITALS
DIASTOLIC BLOOD PRESSURE: 72 MMHG | WEIGHT: 151 LBS | SYSTOLIC BLOOD PRESSURE: 134 MMHG | BODY MASS INDEX: 25.92 KG/M2 | OXYGEN SATURATION: 97 % | HEART RATE: 75 BPM

## 2025-01-15 DIAGNOSIS — E11.42 DM TYPE 2 WITH DIABETIC PERIPHERAL NEUROPATHY (HCC): Primary | ICD-10-CM

## 2025-01-15 DIAGNOSIS — M17.12 ARTHRITIS OF KNEE, LEFT: ICD-10-CM

## 2025-01-15 PROBLEM — D18.02 BRAIN HEMANGIOMA (HCC): Status: RESOLVED | Noted: 2023-03-06 | Resolved: 2025-01-15

## 2025-01-15 PROCEDURE — 99212 OFFICE O/P EST SF 10 MIN: CPT | Performed by: FAMILY MEDICINE

## 2025-01-15 SDOH — ECONOMIC STABILITY: FOOD INSECURITY: WITHIN THE PAST 12 MONTHS, YOU WORRIED THAT YOUR FOOD WOULD RUN OUT BEFORE YOU GOT MONEY TO BUY MORE.: NEVER TRUE

## 2025-01-15 SDOH — ECONOMIC STABILITY: FOOD INSECURITY: WITHIN THE PAST 12 MONTHS, THE FOOD YOU BOUGHT JUST DIDN'T LAST AND YOU DIDN'T HAVE MONEY TO GET MORE.: NEVER TRUE

## 2025-01-15 ASSESSMENT — PATIENT HEALTH QUESTIONNAIRE - PHQ9
SUM OF ALL RESPONSES TO PHQ9 QUESTIONS 1 & 2: 0
1. LITTLE INTEREST OR PLEASURE IN DOING THINGS: NOT AT ALL
8. MOVING OR SPEAKING SO SLOWLY THAT OTHER PEOPLE COULD HAVE NOTICED. OR THE OPPOSITE, BEING SO FIGETY OR RESTLESS THAT YOU HAVE BEEN MOVING AROUND A LOT MORE THAN USUAL: NOT AT ALL
5. POOR APPETITE OR OVEREATING: NOT AT ALL
SUM OF ALL RESPONSES TO PHQ QUESTIONS 1-9: 5
4. FEELING TIRED OR HAVING LITTLE ENERGY: NEARLY EVERY DAY
SUM OF ALL RESPONSES TO PHQ QUESTIONS 1-9: 5
6. FEELING BAD ABOUT YOURSELF - OR THAT YOU ARE A FAILURE OR HAVE LET YOURSELF OR YOUR FAMILY DOWN: NOT AT ALL
2. FEELING DOWN, DEPRESSED OR HOPELESS: NOT AT ALL
9. THOUGHTS THAT YOU WOULD BE BETTER OFF DEAD, OR OF HURTING YOURSELF: NOT AT ALL
7. TROUBLE CONCENTRATING ON THINGS, SUCH AS READING THE NEWSPAPER OR WATCHING TELEVISION: NOT AT ALL
3. TROUBLE FALLING OR STAYING ASLEEP: MORE THAN HALF THE DAYS

## 2025-01-15 NOTE — PROGRESS NOTES
10/25/2024    CO2 33 (H) 10/25/2024    TSH 1.15 10/25/2024    INR 1.1 09/16/2023    LABA1C 9.0 (H) 11/26/2024    LABA1C 9.1 (H) 09/24/2024    LABA1C 9.3 (H) 08/13/2024       No follow-ups on file.      Subjective:      HPI:     Liv Ron is c/o of Discuss Medications (Pt states that she is here to make sure she is taking the correct insulin )      History of Present Illness  The patient presents for evaluation of diabetes mellitus and knee pain.    She has been managing her diabetes with Trulicity 4.5, which she administers every Tuesday. She also takes Lantus at a dosage of 60 units nightly before bedtime and Humalog three times daily with meals. She has not yet started Basaglar treatment. She has expressed a desire to donate her unused U-500 insulin to a nursing home, provided it is permissible by law.    She has expressed interest in exploring non-surgical treatment options for her knee arthritis, as she is apprehensive about undergoing surgery. She experiences difficulty in ambulating across her kitchen due to fear of falling, a concern exacerbated when carrying objects. Her therapist has observed her tendency to rely on furniture for support while walking. She reports that pain is the primary cause of her falls.    MEDICATIONS  Current: Zyrtec, Trulicity, Lantus, Humalog    BP Readings from Last 3 Encounters:   01/15/25 134/72   12/19/24 126/74   11/21/24 132/74          (goal 120/80)    Wt Readings from Last 3 Encounters:   01/15/25 68.5 kg (151 lb)   12/19/24 67.6 kg (149 lb)   11/21/24 68 kg (150 lb)        Past Medical History:   Diagnosis Date    CAD (coronary artery disease)     Depression     Hypertension     Hypothyroidism     Major depressive disorder with single episode, in partial remission ([facility]) 10/14/2018    Neuropathy     Neuropathy     Shingles 05/2017    Sleep apnea     CPAP    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)     Type II or unspecified type

## 2025-01-21 ENCOUNTER — TELEPHONE (OUTPATIENT)
Dept: FAMILY MEDICINE CLINIC | Age: 89
End: 2025-01-21

## 2025-01-21 DIAGNOSIS — E11.42 DM TYPE 2 WITH DIABETIC PERIPHERAL NEUROPATHY (HCC): Primary | ICD-10-CM

## 2025-01-23 RX ORDER — INSULIN GLARGINE 100 [IU]/ML
45 INJECTION, SOLUTION SUBCUTANEOUS NIGHTLY
Qty: 36 ADJUSTABLE DOSE PRE-FILLED PEN SYRINGE | Refills: 3
Start: 2025-01-23

## 2025-01-23 NOTE — TELEPHONE ENCOUNTER
CGM report downloaded and reviewed from the past 2 weeks scanned to media tab. Time in range 47%, 58% hyperglycemia, and hypoglycemia 4% Very Low 1% with lowest reading 53.  Predicted A1c per CGM report 7.8% and average glucose 188 mg/dl.  Lows were primarily in the morning hours.    Please have Blanka cut her night time Lantus (or basaglar if she has started this yet) down to 50 units rather than the 60 units she has been taking due to the lower sugars.  Thanks.

## 2025-01-23 NOTE — TELEPHONE ENCOUNTER
Patient states she was already taking 50 units at night. She states she only takes lantus once a day in the evening - 50 units. She states this was decreased at her visit a week ago.

## 2025-02-13 ENCOUNTER — TELEPHONE (OUTPATIENT)
Dept: FAMILY MEDICINE CLINIC | Age: 89
End: 2025-02-13

## 2025-02-13 NOTE — TELEPHONE ENCOUNTER
PT is still having trouble hearing even with hearing aides, daughter in law wants Dr. Nixon to call her(Daughter in law) to see what she wants them to do, the daughter in law didn't want to make an appointment and they get mad, so she would like a call, but I see she is not a .

## 2025-02-24 ENCOUNTER — TELEPHONE (OUTPATIENT)
Dept: FAMILY MEDICINE CLINIC | Age: 89
End: 2025-02-24
Payer: MEDICARE

## 2025-02-24 DIAGNOSIS — E11.42 DM TYPE 2 WITH DIABETIC PERIPHERAL NEUROPATHY (HCC): Primary | ICD-10-CM

## 2025-02-24 PROCEDURE — 95251 CONT GLUC MNTR ANALYSIS I&R: CPT | Performed by: FAMILY MEDICINE

## 2025-03-17 NOTE — TELEPHONE ENCOUNTER
CGM report downloaded and reviewed from the past 2 weeks scanned to media tab. Time in range 44%, 53% hyperglycemia, and hypoglycemia 3%.  Predicted A1c per CGM report 178% and average glucose 7.8 mg/dl.     Still has a few nighttime lows.  Will review in the next month- no severe. If persist then may add a bedtime snack vs dropping down the basal and increasing the post prandial insulin.

## 2025-03-19 ENCOUNTER — OFFICE VISIT (OUTPATIENT)
Dept: FAMILY MEDICINE CLINIC | Age: 89
End: 2025-03-19
Payer: MEDICARE

## 2025-03-19 VITALS
SYSTOLIC BLOOD PRESSURE: 134 MMHG | HEART RATE: 90 BPM | DIASTOLIC BLOOD PRESSURE: 76 MMHG | WEIGHT: 146 LBS | OXYGEN SATURATION: 96 % | BODY MASS INDEX: 25.06 KG/M2

## 2025-03-19 DIAGNOSIS — I25.10 ATHEROSCLEROSIS OF NATIVE CORONARY ARTERY OF NATIVE HEART WITHOUT ANGINA PECTORIS: Primary | ICD-10-CM

## 2025-03-19 DIAGNOSIS — E11.42 DM TYPE 2 WITH DIABETIC PERIPHERAL NEUROPATHY (HCC): ICD-10-CM

## 2025-03-19 PROCEDURE — 1124F ACP DISCUSS-NO DSCNMKR DOCD: CPT | Performed by: FAMILY MEDICINE

## 2025-03-19 PROCEDURE — 3052F HG A1C>EQUAL 8.0%<EQUAL 9.0%: CPT | Performed by: FAMILY MEDICINE

## 2025-03-19 PROCEDURE — 1159F MED LIST DOCD IN RCRD: CPT | Performed by: FAMILY MEDICINE

## 2025-03-19 PROCEDURE — 99214 OFFICE O/P EST MOD 30 MIN: CPT | Performed by: FAMILY MEDICINE

## 2025-03-19 PROCEDURE — 1090F PRES/ABSN URINE INCON ASSESS: CPT | Performed by: FAMILY MEDICINE

## 2025-03-19 PROCEDURE — 99213 OFFICE O/P EST LOW 20 MIN: CPT | Performed by: FAMILY MEDICINE

## 2025-03-19 PROCEDURE — G8427 DOCREV CUR MEDS BY ELIG CLIN: HCPCS | Performed by: FAMILY MEDICINE

## 2025-03-19 PROCEDURE — 1036F TOBACCO NON-USER: CPT | Performed by: FAMILY MEDICINE

## 2025-03-19 PROCEDURE — G2211 COMPLEX E/M VISIT ADD ON: HCPCS | Performed by: FAMILY MEDICINE

## 2025-03-19 PROCEDURE — G8419 CALC BMI OUT NRM PARAM NOF/U: HCPCS | Performed by: FAMILY MEDICINE

## 2025-03-19 RX ORDER — METOPROLOL SUCCINATE 50 MG/1
50 TABLET, EXTENDED RELEASE ORAL DAILY
Qty: 90 TABLET | Refills: 3 | Status: SHIPPED | OUTPATIENT
Start: 2025-03-19

## 2025-03-19 RX ORDER — INSULIN LISPRO 100 [IU]/ML
INJECTION, SOLUTION INTRAVENOUS; SUBCUTANEOUS
Qty: 13 ADJUSTABLE DOSE PRE-FILLED PEN SYRINGE | Refills: 3
Start: 2025-03-19

## 2025-03-19 NOTE — PROGRESS NOTES
once daily 90 capsule 3    clopidogrel (PLAVIX) 75 MG tablet Take 1 tablet by mouth daily 90 tablet 1    rosuvastatin (CRESTOR) 5 MG tablet Take 1 tablet by mouth daily      Dulaglutide 4.5 MG/0.5ML SOPN Inject 4.5 mg into the skin once a week      B-D UF III MINI PEN NEEDLES 31G X 5 MM MISC Use to inject insulin three times daily      insulin glargine (BASAGLAR KWIKPEN) 100 UNIT/ML injection pen Inject 45 Units into the skin nightly 36 Adjustable Dose Pre-filled Pen Syringe 3    traMADol (ULTRAM) 50 MG tablet Take 1 tablet by mouth 2 times daily as needed.      Continuous Glucose Sensor (DEXCOM G7 SENSOR) MISC Use as directed to monitor blood glucose      acetaminophen (TYLENOL) 500 MG tablet Take 2 tablets by mouth every 6 hours as needed for Pain      diclofenac sodium (VOLTAREN) 1 % GEL Apply 2GM topically to knee four times daily      calcium carbonate (TUMS) 500 MG chewable tablet Take 1 tablet by mouth daily as needed for Heartburn      Misc. Devices (WALKER AUTO GLIDES) MISC 1 each by Does not apply route daily 1 each 0    Respiratory Therapy Supplies BUD 1 Device by Does not apply route daily New CPAP mask and tubing 1 Device 1    TRUE METRIX BLOOD GLUCOSE TEST strip Use as directed to verify blood glucose readings from CGM  0     No current facility-administered medications for this visit.     Allergies   Allergen Reactions    Atorvastatin Myalgia    Exenatide Other (See Comments)     Other reaction(s): stomach upset    Glucophage  [Metformin Hcl] Other (See Comments)    Hydromorphone     Invokana [Canagliflozin]      yeast    Lipitor  [Atorvastatin Calcium] Other (See Comments)    Other      novotwist    Glucophage [Metformin Hcl] Nausea And Vomiting    Ketorolac Nausea And Vomiting    Metformin      Other reaction(s): Intolerance-unknown  Other reaction(s): Nausea And Vomiting       Health Maintenance   Topic Date Due    Respiratory Syncytial Virus (RSV) Pregnant or age 60 yrs+ (1 - 1-dose 75+ series)

## 2025-03-19 NOTE — ASSESSMENT & PLAN NOTE
Orders:    metoprolol succinate (TOPROL XL) 50 MG extended release tablet; Take 1 tablet by mouth daily

## 2025-03-19 NOTE — ASSESSMENT & PLAN NOTE
Orders:    insulin lispro, 1 Unit Dial, (HUMALOG KWIKPEN) 100 UNIT/ML SOPN; Sliding scale insulin:   Under 70 take none    take 20 units   101-150 take 45 units   151-200 take 48 units   201-250 take 51 units   251-300 take 54 units   Over 300 take 57 units.

## 2025-03-19 NOTE — PATIENT INSTRUCTIONS
Sliding scale insulin:   Under 70 take none    take 20 units   101-150 take 45 units   151-200 take 48 units   201-250 take 51 units   251-300 take 54 units   Over 300 take 57 units.

## 2025-03-25 ENCOUNTER — TELEPHONE (OUTPATIENT)
Dept: FAMILY MEDICINE CLINIC | Age: 89
End: 2025-03-25
Payer: MEDICARE

## 2025-03-25 DIAGNOSIS — E11.42 DM TYPE 2 WITH DIABETIC PERIPHERAL NEUROPATHY (HCC): Primary | ICD-10-CM

## 2025-03-25 PROCEDURE — 95251 CONT GLUC MNTR ANALYSIS I&R: CPT | Performed by: FAMILY MEDICINE

## 2025-04-08 DIAGNOSIS — E11.42 DM TYPE 2 WITH DIABETIC PERIPHERAL NEUROPATHY (HCC): ICD-10-CM

## 2025-04-08 RX ORDER — INSULIN LISPRO 100 [IU]/ML
INJECTION, SOLUTION INTRAVENOUS; SUBCUTANEOUS
Qty: 13 ADJUSTABLE DOSE PRE-FILLED PEN SYRINGE | Refills: 3 | Status: CANCELLED | OUTPATIENT
Start: 2025-04-08

## 2025-04-08 NOTE — TELEPHONE ENCOUNTER
Please refill  Patient states that this is sent to [facility], so I didn't know what to vanna for a pharmacy. She said it is sent to her home.

## 2025-04-09 NOTE — TELEPHONE ENCOUNTER
Called [facility] - they needed clarification on max daily dose before they can dispense the med.  Humalog TID sliding scale. Info provided. They are going to process refill and ship medication to patient.

## 2025-04-29 ENCOUNTER — TELEPHONE (OUTPATIENT)
Dept: FAMILY MEDICINE CLINIC | Age: 89
End: 2025-04-29
Payer: MEDICARE

## 2025-04-29 DIAGNOSIS — E11.42 DM TYPE 2 WITH DIABETIC PERIPHERAL NEUROPATHY (HCC): Primary | ICD-10-CM

## 2025-04-29 PROCEDURE — 95251 CONT GLUC MNTR ANALYSIS I&R: CPT | Performed by: FAMILY MEDICINE

## 2025-05-14 RX ORDER — INSULIN LISPRO 100 [IU]/ML
INJECTION, SOLUTION INTRAVENOUS; SUBCUTANEOUS
Qty: 13 ADJUSTABLE DOSE PRE-FILLED PEN SYRINGE | Refills: 3 | Status: SHIPPED | OUTPATIENT
Start: 2025-05-14

## 2025-05-14 RX ORDER — INSULIN GLARGINE 100 [IU]/ML
40 INJECTION, SOLUTION SUBCUTANEOUS NIGHTLY
Qty: 36 ADJUSTABLE DOSE PRE-FILLED PEN SYRINGE | Refills: 3 | Status: SHIPPED | OUTPATIENT
Start: 2025-05-14

## 2025-05-14 NOTE — TELEPHONE ENCOUNTER
Notify Blanka she is still having more lows than I would like to see over night.  I am having her take more insulin in the day time-- see the sliding scale.  I would like her to decrease her long acting insulin-- the basaglar to only 40 units daily. We will see how she is doing with this at her [facility] appt.

## 2025-05-15 NOTE — TELEPHONE ENCOUNTER
Patient notified by phone. She states once in a while she skips the nighttime insulin when her sugar is low.  She is going to  the sliding scale directions in [place] this afternoon.

## 2025-06-03 ENCOUNTER — TELEPHONE (OUTPATIENT)
Dept: FAMILY MEDICINE CLINIC | Age: 89
End: 2025-06-03

## 2025-06-03 DIAGNOSIS — E11.42 DM TYPE 2 WITH DIABETIC PERIPHERAL NEUROPATHY (HCC): Primary | ICD-10-CM

## 2025-06-09 RX ORDER — DULOXETIN HYDROCHLORIDE 60 MG/1
60 CAPSULE, DELAYED RELEASE ORAL DAILY
Qty: 90 CAPSULE | Refills: 1 | Status: SHIPPED | OUTPATIENT
Start: 2025-06-09

## 2025-06-18 ENCOUNTER — OFFICE VISIT (OUTPATIENT)
Dept: FAMILY MEDICINE CLINIC | Age: 89
End: 2025-06-18

## 2025-06-18 VITALS
BODY MASS INDEX: 26.63 KG/M2 | DIASTOLIC BLOOD PRESSURE: 74 MMHG | SYSTOLIC BLOOD PRESSURE: 130 MMHG | HEART RATE: 64 BPM | WEIGHT: 156 LBS | OXYGEN SATURATION: 95 % | HEIGHT: 64 IN

## 2025-06-18 DIAGNOSIS — I25.10 ATHEROSCLEROSIS OF NATIVE CORONARY ARTERY OF NATIVE HEART WITHOUT ANGINA PECTORIS: ICD-10-CM

## 2025-06-18 DIAGNOSIS — E78.2 MIXED HYPERLIPIDEMIA: ICD-10-CM

## 2025-06-18 DIAGNOSIS — Z00.00 MEDICARE ANNUAL WELLNESS VISIT, SUBSEQUENT: Primary | ICD-10-CM

## 2025-06-18 DIAGNOSIS — E03.9 ACQUIRED HYPOTHYROIDISM: ICD-10-CM

## 2025-06-18 DIAGNOSIS — E11.42 DM TYPE 2 WITH DIABETIC PERIPHERAL NEUROPATHY (HCC): ICD-10-CM

## 2025-06-18 PROBLEM — M17.12 OSTEOARTHRITIS OF LEFT KNEE: Status: ACTIVE | Noted: 2024-08-22

## 2025-06-18 LAB — HBA1C MFR BLD: 8 %

## 2025-06-18 ASSESSMENT — PATIENT HEALTH QUESTIONNAIRE - PHQ9
SUM OF ALL RESPONSES TO PHQ QUESTIONS 1-9: 7
1. LITTLE INTEREST OR PLEASURE IN DOING THINGS: MORE THAN HALF THE DAYS
2. FEELING DOWN, DEPRESSED OR HOPELESS: SEVERAL DAYS
SUM OF ALL RESPONSES TO PHQ QUESTIONS 1-9: 7
SUM OF ALL RESPONSES TO PHQ QUESTIONS 1-9: 7
3. TROUBLE FALLING OR STAYING ASLEEP: NOT AT ALL
8. MOVING OR SPEAKING SO SLOWLY THAT OTHER PEOPLE COULD HAVE NOTICED. OR THE OPPOSITE, BEING SO FIGETY OR RESTLESS THAT YOU HAVE BEEN MOVING AROUND A LOT MORE THAN USUAL: NOT AT ALL
10. IF YOU CHECKED OFF ANY PROBLEMS, HOW DIFFICULT HAVE THESE PROBLEMS MADE IT FOR YOU TO DO YOUR WORK, TAKE CARE OF THINGS AT HOME, OR GET ALONG WITH OTHER PEOPLE: NOT DIFFICULT AT ALL
SUM OF ALL RESPONSES TO PHQ QUESTIONS 1-9: 7
6. FEELING BAD ABOUT YOURSELF - OR THAT YOU ARE A FAILURE OR HAVE LET YOURSELF OR YOUR FAMILY DOWN: SEVERAL DAYS
7. TROUBLE CONCENTRATING ON THINGS, SUCH AS READING THE NEWSPAPER OR WATCHING TELEVISION: NOT AT ALL
9. THOUGHTS THAT YOU WOULD BE BETTER OFF DEAD, OR OF HURTING YOURSELF: NOT AT ALL
4. FEELING TIRED OR HAVING LITTLE ENERGY: NEARLY EVERY DAY
5. POOR APPETITE OR OVEREATING: NOT AT ALL

## 2025-06-18 NOTE — PROGRESS NOTES
Medicare Annual Wellness Visit    Liv Ron is here for Medicare AWV and Knee Pain (Pt states that the last couple of days she has been noticing her left knee pain is getting worse. Pt states that she has pain all the time.)    Assessment & Plan  1. Diabetes Mellitus.  - A1c levels have shown significant improvement, currently at 8.  - Continuous glucose monitoring (CGM) data indicates satisfactory control with occasional minor dips in blood sugar levels.  - Advised to continue current regimen of Humalog on a sliding scale with meals and Basaglar 40 units. May reduce Basaglar dose by half if blood sugar levels are only 100 at bedtime.  - Instructed to report any instances of hypoglycemia.    2. Knee Pain.  - Knee pain could be exacerbated by current wet weather conditions.  - Advised to consider another round of radiation therapy if pain persists.  - Will follow up with Dr. Gerber for further management.    Follow-up  The patient will follow up in 4 months.      Medicare annual wellness visit, subsequent  DM type 2 with diabetic peripheral neuropathy (HCC)  -     POCT glycosylated hemoglobin (Hb A1C)  -     CBC with Auto Differential; Future  -     Comprehensive Metabolic Panel; Future  Atherosclerosis of native coronary artery of native heart without angina pectoris  -     Comprehensive Metabolic Panel; Future  -     Lipid Panel; Future  Acquired hypothyroidism  -     TSH; Future  -     T4, Free; Future  Mixed hyperlipidemia    Results  Labs   - A1c: 8     No follow-ups on file.     Subjective   History of Present Illness  The patient presents for evaluation of diabetes management and knee pain.    Her blood glucose levels have been well-managed overall, with no recent instances of severe hypoglycemia. She reports a significant low blood glucose level of 40 one morning, which led her to skip her insulin dose and consume waffles with butter, peanut butter, and strawberries. Typically, low blood glucose levels

## 2025-07-10 ENCOUNTER — TELEPHONE (OUTPATIENT)
Dept: FAMILY MEDICINE CLINIC | Age: 89
End: 2025-07-10
Payer: MEDICARE

## 2025-07-10 DIAGNOSIS — E11.42 DM TYPE 2 WITH DIABETIC PERIPHERAL NEUROPATHY (HCC): Primary | ICD-10-CM

## 2025-07-10 PROCEDURE — 95250 CONT GLUC MNTR PHYS/QHP EQP: CPT | Performed by: FAMILY MEDICINE

## 2025-07-14 NOTE — TELEPHONE ENCOUNTER
CGM report downloaded and reviewed from the past 2 weeks scanned to media tab. Time in range 37%, 62% hyperglycemia, and hypoglycemia 1%.  Predicted A1c per CGM report 8.6% and average glucose 222 mg/dl.     Is better than has been.  With her fragile status and frequent lows will leave meds alone this month and work on better dietary discretion.

## 2025-08-13 ENCOUNTER — TELEPHONE (OUTPATIENT)
Dept: FAMILY MEDICINE CLINIC | Age: 89
End: 2025-08-13
Payer: MEDICARE

## 2025-08-13 DIAGNOSIS — E11.42 DM TYPE 2 WITH DIABETIC PERIPHERAL NEUROPATHY (HCC): ICD-10-CM

## 2025-08-13 PROCEDURE — 95251 CONT GLUC MNTR ANALYSIS I&R: CPT | Performed by: FAMILY MEDICINE

## 2025-08-19 RX ORDER — INSULIN LISPRO 100 [IU]/ML
INJECTION, SOLUTION INTRAVENOUS; SUBCUTANEOUS
Qty: 13 ADJUSTABLE DOSE PRE-FILLED PEN SYRINGE | Refills: 3 | Status: SHIPPED | OUTPATIENT
Start: 2025-08-19